# Patient Record
Sex: FEMALE | Race: WHITE | Employment: FULL TIME | URBAN - NONMETROPOLITAN AREA
[De-identification: names, ages, dates, MRNs, and addresses within clinical notes are randomized per-mention and may not be internally consistent; named-entity substitution may affect disease eponyms.]

---

## 2017-04-17 ENCOUNTER — TELEPHONE (OUTPATIENT)
Dept: UROLOGY | Age: 62
End: 2017-04-17

## 2017-04-19 ENCOUNTER — OFFICE VISIT (OUTPATIENT)
Dept: UROLOGY | Age: 62
End: 2017-04-19

## 2017-04-19 VITALS
HEIGHT: 69 IN | SYSTOLIC BLOOD PRESSURE: 112 MMHG | WEIGHT: 250 LBS | DIASTOLIC BLOOD PRESSURE: 70 MMHG | BODY MASS INDEX: 37.03 KG/M2

## 2017-04-19 DIAGNOSIS — N39.0 URINARY TRACT INFECTION, SITE UNSPECIFIED: Primary | ICD-10-CM

## 2017-04-19 LAB
BILIRUBIN URINE: NEGATIVE
BLOOD URINE, POC: NEGATIVE
CHARACTER, URINE: CLEAR
COLOR, URINE: YELLOW
GLUCOSE URINE: NEGATIVE MG/DL
KETONES, URINE: NEGATIVE
LEUKOCYTE CLUMPS, URINE: NORMAL
NITRITE, URINE: NEGATIVE
PH, URINE: 7
POST VOID RESIDUAL (PVR): 131 ML
PROTEIN, URINE: NEGATIVE MG/DL
SPECIFIC GRAVITY, URINE: 1.01 (ref 1–1.03)
UROBILINOGEN, URINE: 0.2 EU/DL

## 2017-04-19 PROCEDURE — 51798 US URINE CAPACITY MEASURE: CPT | Performed by: NURSE PRACTITIONER

## 2017-04-19 PROCEDURE — 81003 URINALYSIS AUTO W/O SCOPE: CPT | Performed by: NURSE PRACTITIONER

## 2017-04-19 PROCEDURE — 99214 OFFICE O/P EST MOD 30 MIN: CPT | Performed by: NURSE PRACTITIONER

## 2017-04-28 ENCOUNTER — TELEPHONE (OUTPATIENT)
Dept: UROLOGY | Age: 62
End: 2017-04-28

## 2017-04-28 DIAGNOSIS — R33.9 INCOMPLETE EMPTYING OF BLADDER: ICD-10-CM

## 2017-04-28 DIAGNOSIS — R35.0 URINARY FREQUENCY: Primary | ICD-10-CM

## 2017-04-29 ENCOUNTER — TELEPHONE (OUTPATIENT)
Dept: UROLOGY | Age: 62
End: 2017-04-29

## 2017-04-29 RX ORDER — DOXYCYCLINE HYCLATE 100 MG
100 TABLET ORAL 2 TIMES DAILY
Qty: 10 TABLET | Refills: 0 | Status: SHIPPED | OUTPATIENT
Start: 2017-04-29 | End: 2017-05-04

## 2017-05-08 ENCOUNTER — OFFICE VISIT (OUTPATIENT)
Dept: UROLOGY | Age: 62
End: 2017-05-08

## 2017-05-08 VITALS
SYSTOLIC BLOOD PRESSURE: 120 MMHG | WEIGHT: 249 LBS | DIASTOLIC BLOOD PRESSURE: 82 MMHG | BODY MASS INDEX: 36.88 KG/M2 | HEIGHT: 69 IN

## 2017-05-08 DIAGNOSIS — R35.0 URINARY FREQUENCY: Primary | ICD-10-CM

## 2017-05-08 DIAGNOSIS — Z87.440 HISTORY OF RECURRENT UTIS: ICD-10-CM

## 2017-05-08 LAB
BILIRUBIN URINE: NEGATIVE
BLOOD URINE, POC: NORMAL
CHARACTER, URINE: CLEAR
COLOR, URINE: YELLOW
GLUCOSE URINE: NEGATIVE MG/DL
KETONES, URINE: NEGATIVE
LEUKOCYTE CLUMPS, URINE: NORMAL
NITRITE, URINE: NEGATIVE
PH, URINE: 7
POST VOID RESIDUAL (PVR): 0 ML
POST VOID RESIDUAL (PVR): 91 ML
PROTEIN, URINE: NEGATIVE MG/DL
SPECIFIC GRAVITY, URINE: 1.01 (ref 1–1.03)
UROBILINOGEN, URINE: 0.2 EU/DL

## 2017-05-08 PROCEDURE — 51798 US URINE CAPACITY MEASURE: CPT | Performed by: UROLOGY

## 2017-05-08 PROCEDURE — 99999 PR OFFICE/OUTPT VISIT,PROCEDURE ONLY: CPT | Performed by: UROLOGY

## 2017-05-08 PROCEDURE — 52000 CYSTOURETHROSCOPY: CPT | Performed by: UROLOGY

## 2017-05-08 PROCEDURE — 81003 URINALYSIS AUTO W/O SCOPE: CPT | Performed by: UROLOGY

## 2017-05-08 RX ORDER — DOXYCYCLINE HYCLATE 100 MG/1
100 CAPSULE ORAL 2 TIMES DAILY
COMMUNITY
End: 2017-06-30 | Stop reason: ALTCHOICE

## 2017-05-08 ASSESSMENT — ENCOUNTER SYMPTOMS
VOMITING: 0
CHEST TIGHTNESS: 0
FACIAL SWELLING: 0
EYE REDNESS: 0
SHORTNESS OF BREATH: 0
EYE PAIN: 1
ABDOMINAL PAIN: 0
COLOR CHANGE: 0

## 2017-06-30 ENCOUNTER — OFFICE VISIT (OUTPATIENT)
Dept: UROLOGY | Age: 62
End: 2017-06-30

## 2017-06-30 VITALS
WEIGHT: 251 LBS | BODY MASS INDEX: 37.18 KG/M2 | HEIGHT: 69 IN | SYSTOLIC BLOOD PRESSURE: 118 MMHG | DIASTOLIC BLOOD PRESSURE: 88 MMHG

## 2017-06-30 DIAGNOSIS — N39.0 RECURRENT UTI: Primary | ICD-10-CM

## 2017-06-30 LAB
BILIRUBIN URINE: NEGATIVE
BLOOD URINE, POC: NORMAL
CHARACTER, URINE: CLEAR
COLOR, URINE: YELLOW
GLUCOSE URINE: NEGATIVE MG/DL
KETONES, URINE: NEGATIVE
LEUKOCYTE CLUMPS, URINE: NORMAL
NITRITE, URINE: NEGATIVE
PH, URINE: 7
POST VOID RESIDUAL (PVR): 156 ML
PROTEIN, URINE: NEGATIVE MG/DL
SPECIFIC GRAVITY, URINE: 1.01 (ref 1–1.03)
UROBILINOGEN, URINE: 0.2 EU/DL

## 2017-06-30 PROCEDURE — 81003 URINALYSIS AUTO W/O SCOPE: CPT | Performed by: NURSE PRACTITIONER

## 2017-06-30 PROCEDURE — 99213 OFFICE O/P EST LOW 20 MIN: CPT | Performed by: NURSE PRACTITIONER

## 2017-06-30 PROCEDURE — 51798 US URINE CAPACITY MEASURE: CPT | Performed by: NURSE PRACTITIONER

## 2017-06-30 ASSESSMENT — ENCOUNTER SYMPTOMS
VOMITING: 0
ABDOMINAL PAIN: 0
NAUSEA: 0

## 2017-07-31 ENCOUNTER — HOSPITAL ENCOUNTER (EMERGENCY)
Age: 62
Discharge: HOME | End: 2017-07-31
Attending: FAMILY MEDICINE
Payer: COMMERCIAL

## 2017-07-31 ENCOUNTER — APPOINTMENT (OUTPATIENT)
Dept: CT IMAGING | Age: 62
End: 2017-07-31
Payer: COMMERCIAL

## 2017-07-31 VITALS
OXYGEN SATURATION: 100 % | SYSTOLIC BLOOD PRESSURE: 128 MMHG | WEIGHT: 250 LBS | DIASTOLIC BLOOD PRESSURE: 73 MMHG | RESPIRATION RATE: 18 BRPM | TEMPERATURE: 98.2 F | HEIGHT: 69 IN | HEART RATE: 66 BPM | BODY MASS INDEX: 37.03 KG/M2

## 2017-07-31 DIAGNOSIS — H81.10 BENIGN PAROXYSMAL POSITIONAL VERTIGO, UNSPECIFIED LATERALITY: Primary | ICD-10-CM

## 2017-07-31 LAB
ANION GAP SERPL CALCULATED.3IONS-SCNC: 12 MEQ/L (ref 8–16)
ANISOCYTOSIS: ABNORMAL
BACTERIA: ABNORMAL
BASOPHILS # BLD: 0.4 %
BASOPHILS ABSOLUTE: 0 THOU/MM3 (ref 0–0.1)
BILIRUBIN URINE: NEGATIVE
BLOOD, URINE: NEGATIVE
BUN BLDV-MCNC: 12 MG/DL (ref 7–22)
CALCIUM SERPL-MCNC: 9.4 MG/DL (ref 8.5–10.5)
CASTS: ABNORMAL /LPF
CASTS: ABNORMAL /LPF
CHARACTER, URINE: CLEAR
CHLORIDE BLD-SCNC: 102 MEQ/L (ref 98–111)
CO2: 28 MEQ/L (ref 23–33)
COLOR: YELLOW
CREAT SERPL-MCNC: 0.7 MG/DL (ref 0.4–1.2)
CRYSTALS: ABNORMAL
EKG ATRIAL RATE: 68 BPM
EKG P AXIS: 68 DEGREES
EKG P-R INTERVAL: 186 MS
EKG Q-T INTERVAL: 386 MS
EKG QRS DURATION: 94 MS
EKG QTC CALCULATION (BAZETT): 410 MS
EKG R AXIS: -14 DEGREES
EKG T AXIS: 12 DEGREES
EKG VENTRICULAR RATE: 68 BPM
EOSINOPHIL # BLD: 2.9 %
EOSINOPHILS ABSOLUTE: 0.2 THOU/MM3 (ref 0–0.4)
EPITHELIAL CELLS, UA: ABNORMAL /HPF
GFR SERPL CREATININE-BSD FRML MDRD: 85 ML/MIN/1.73M2
GLUCOSE BLD-MCNC: 108 MG/DL (ref 70–108)
GLUCOSE, URINE: NEGATIVE MG/DL
HCT VFR BLD CALC: 42.3 % (ref 37–47)
HEMOGLOBIN: 14 GM/DL (ref 12–16)
KETONES, URINE: NEGATIVE
LEUKOCYTE ESTERASE, URINE: ABNORMAL
LYMPHOCYTES # BLD: 31.6 %
LYMPHOCYTES ABSOLUTE: 2.5 THOU/MM3 (ref 1–4.8)
MCH RBC QN AUTO: 29.5 PG (ref 27–31)
MCHC RBC AUTO-ENTMCNC: 33 GM/DL (ref 33–37)
MCV RBC AUTO: 89.4 FL (ref 81–99)
MISCELLANEOUS LAB TEST RESULT: ABNORMAL
MONOCYTES # BLD: 7.2 %
MONOCYTES ABSOLUTE: 0.6 THOU/MM3 (ref 0.4–1.3)
NITRITE, URINE: NEGATIVE
NUCLEATED RED BLOOD CELLS: 0 /100 WBC
OSMOLALITY CALCULATION: 283.4 MOSMOL/KG (ref 275–300)
PDW BLD-RTO: 14.7 % (ref 11.5–14.5)
PH UA: 6.5
PLATELET # BLD: 263 THOU/MM3 (ref 130–400)
PMV BLD AUTO: 8.3 MCM (ref 7.4–10.4)
POTASSIUM SERPL-SCNC: 4.2 MEQ/L (ref 3.5–5.2)
PROTEIN UA: NEGATIVE MG/DL
RBC # BLD: 4.73 MILL/MM3 (ref 4.2–5.4)
RBC # BLD: NORMAL 10*6/UL
RBC URINE: ABNORMAL /HPF
RENAL EPITHELIAL, UA: ABNORMAL
SEG NEUTROPHILS: 57.9 %
SEGMENTED NEUTROPHILS ABSOLUTE COUNT: 4.5 THOU/MM3 (ref 1.8–7.7)
SODIUM BLD-SCNC: 142 MEQ/L (ref 135–145)
SPECIFIC GRAVITY UA: 1.01 (ref 1–1.03)
UROBILINOGEN, URINE: 0.2 EU/DL
WBC # BLD: 7.8 THOU/MM3 (ref 4.8–10.8)
WBC UA: ABNORMAL /HPF
YEAST: ABNORMAL

## 2017-07-31 PROCEDURE — 85025 COMPLETE CBC W/AUTO DIFF WBC: CPT

## 2017-07-31 PROCEDURE — 36415 COLL VENOUS BLD VENIPUNCTURE: CPT

## 2017-07-31 PROCEDURE — 99284 EMERGENCY DEPT VISIT MOD MDM: CPT

## 2017-07-31 PROCEDURE — 80048 BASIC METABOLIC PNL TOTAL CA: CPT

## 2017-07-31 PROCEDURE — 81001 URINALYSIS AUTO W/SCOPE: CPT

## 2017-07-31 PROCEDURE — 87086 URINE CULTURE/COLONY COUNT: CPT

## 2017-07-31 PROCEDURE — 93005 ELECTROCARDIOGRAM TRACING: CPT

## 2017-07-31 PROCEDURE — 70450 CT HEAD/BRAIN W/O DYE: CPT

## 2017-07-31 RX ORDER — MECLIZINE HYDROCHLORIDE 25 MG/1
12.5-25 TABLET ORAL 3 TIMES DAILY PRN
Qty: 30 TABLET | Refills: 0 | Status: SHIPPED | OUTPATIENT
Start: 2017-07-31 | End: 2017-07-31

## 2017-07-31 RX ORDER — MECLIZINE HYDROCHLORIDE 25 MG/1
12.5-25 TABLET ORAL 3 TIMES DAILY PRN
Qty: 30 TABLET | Refills: 0 | Status: SHIPPED | OUTPATIENT
Start: 2017-07-31 | End: 2017-12-28 | Stop reason: ALTCHOICE

## 2017-07-31 ASSESSMENT — PAIN DESCRIPTION - LOCATION: LOCATION: HEAD;NECK

## 2017-07-31 ASSESSMENT — ENCOUNTER SYMPTOMS
NAUSEA: 0
VOMITING: 0
SHORTNESS OF BREATH: 0
CHEST TIGHTNESS: 0

## 2017-07-31 ASSESSMENT — PAIN DESCRIPTION - DESCRIPTORS: DESCRIPTORS: ACHING

## 2017-07-31 ASSESSMENT — PAIN SCALES - GENERAL: PAINLEVEL_OUTOF10: 2

## 2017-07-31 ASSESSMENT — PAIN DESCRIPTION - ORIENTATION: ORIENTATION: POSTERIOR

## 2017-07-31 ASSESSMENT — PAIN DESCRIPTION - PAIN TYPE: TYPE: ACUTE PAIN

## 2017-08-01 LAB
ORGANISM: ABNORMAL
URINE CULTURE, ROUTINE: ABNORMAL

## 2017-10-09 ENCOUNTER — HOSPITAL ENCOUNTER (OUTPATIENT)
Dept: GENERAL RADIOLOGY | Age: 62
Discharge: HOME | End: 2017-10-09
Payer: COMMERCIAL

## 2017-10-09 ENCOUNTER — HOSPITAL ENCOUNTER (OUTPATIENT)
Age: 62
Discharge: HOME | End: 2017-10-09
Payer: COMMERCIAL

## 2017-10-09 DIAGNOSIS — M54.2 NECK PAIN: ICD-10-CM

## 2017-10-09 PROCEDURE — 72050 X-RAY EXAM NECK SPINE 4/5VWS: CPT

## 2017-10-09 PROCEDURE — 72072 X-RAY EXAM THORAC SPINE 3VWS: CPT

## 2017-12-25 ENCOUNTER — APPOINTMENT (OUTPATIENT)
Dept: GENERAL RADIOLOGY | Age: 62
End: 2017-12-25
Payer: COMMERCIAL

## 2017-12-25 ENCOUNTER — HOSPITAL ENCOUNTER (EMERGENCY)
Age: 62
Discharge: HOME | End: 2017-12-25
Payer: COMMERCIAL

## 2017-12-25 ENCOUNTER — NURSE TRIAGE (OUTPATIENT)
Dept: ADMINISTRATIVE | Age: 62
End: 2017-12-25

## 2017-12-25 VITALS
HEART RATE: 78 BPM | SYSTOLIC BLOOD PRESSURE: 132 MMHG | OXYGEN SATURATION: 97 % | RESPIRATION RATE: 16 BRPM | DIASTOLIC BLOOD PRESSURE: 65 MMHG | TEMPERATURE: 98.6 F

## 2017-12-25 DIAGNOSIS — N30.01 ACUTE CYSTITIS WITH HEMATURIA: ICD-10-CM

## 2017-12-25 DIAGNOSIS — K59.00 CONSTIPATION, UNSPECIFIED CONSTIPATION TYPE: Primary | ICD-10-CM

## 2017-12-25 LAB
ALBUMIN SERPL-MCNC: 4 G/DL (ref 3.5–5.1)
ALP BLD-CCNC: 101 U/L (ref 38–126)
ALT SERPL-CCNC: 21 U/L (ref 11–66)
ANION GAP SERPL CALCULATED.3IONS-SCNC: 11 MEQ/L (ref 8–16)
ANISOCYTOSIS: ABNORMAL
AST SERPL-CCNC: 26 U/L (ref 5–40)
BACTERIA: ABNORMAL /HPF
BASOPHILS # BLD: 0.6 %
BASOPHILS ABSOLUTE: 0 THOU/MM3 (ref 0–0.1)
BILIRUB SERPL-MCNC: 0.2 MG/DL (ref 0.3–1.2)
BILIRUBIN DIRECT: < 0.2 MG/DL (ref 0–0.3)
BILIRUBIN URINE: NEGATIVE
BLOOD, URINE: ABNORMAL
BUN BLDV-MCNC: 9 MG/DL (ref 7–22)
CALCIUM SERPL-MCNC: 9.2 MG/DL (ref 8.5–10.5)
CASTS 2: ABNORMAL /LPF
CASTS UA: ABNORMAL /LPF
CHARACTER, URINE: ABNORMAL
CHLORIDE BLD-SCNC: 104 MEQ/L (ref 98–111)
CO2: 27 MEQ/L (ref 23–33)
COLOR: YELLOW
CREAT SERPL-MCNC: 0.7 MG/DL (ref 0.4–1.2)
CRYSTALS, UA: ABNORMAL
EKG ATRIAL RATE: 89 BPM
EKG P AXIS: 72 DEGREES
EKG P-R INTERVAL: 176 MS
EKG Q-T INTERVAL: 354 MS
EKG QRS DURATION: 94 MS
EKG QTC CALCULATION (BAZETT): 430 MS
EKG R AXIS: -26 DEGREES
EKG T AXIS: 33 DEGREES
EKG VENTRICULAR RATE: 89 BPM
EOSINOPHIL # BLD: 3.6 %
EOSINOPHILS ABSOLUTE: 0.3 THOU/MM3 (ref 0–0.4)
EPITHELIAL CELLS, UA: ABNORMAL /HPF
ETHYL ALCOHOL, SERUM: < 0.01 %
GFR SERPL CREATININE-BSD FRML MDRD: 85 ML/MIN/1.73M2
GLUCOSE BLD-MCNC: 90 MG/DL (ref 70–108)
GLUCOSE URINE: NEGATIVE MG/DL
HCT VFR BLD CALC: 42.8 % (ref 37–47)
HEMOGLOBIN: 14 GM/DL (ref 12–16)
KETONES, URINE: NEGATIVE
LACTIC ACID: 1.5 MMOL/L (ref 0.5–2.2)
LEUKOCYTE ESTERASE, URINE: ABNORMAL
LIPASE: 44.6 U/L (ref 5.6–51.3)
LYMPHOCYTES # BLD: 27.8 %
LYMPHOCYTES ABSOLUTE: 2.1 THOU/MM3 (ref 1–4.8)
MCH RBC QN AUTO: 29.3 PG (ref 27–31)
MCHC RBC AUTO-ENTMCNC: 32.8 GM/DL (ref 33–37)
MCV RBC AUTO: 89.4 FL (ref 81–99)
MISCELLANEOUS 2: ABNORMAL
MONOCYTES # BLD: 7.1 %
MONOCYTES ABSOLUTE: 0.5 THOU/MM3 (ref 0.4–1.3)
NITRITE, URINE: NEGATIVE
NUCLEATED RED BLOOD CELLS: 0 /100 WBC
OSMOLALITY CALCULATION: 281.3 MOSMOL/KG (ref 275–300)
PDW BLD-RTO: 15 % (ref 11.5–14.5)
PH UA: 6.5
PLATELET # BLD: 290 THOU/MM3 (ref 130–400)
PMV BLD AUTO: 8.3 MCM (ref 7.4–10.4)
POTASSIUM SERPL-SCNC: 4.4 MEQ/L (ref 3.5–5.2)
PROTEIN UA: NEGATIVE
RBC # BLD: 4.78 MILL/MM3 (ref 4.2–5.4)
RBC URINE: ABNORMAL /HPF
RENAL EPITHELIAL, UA: ABNORMAL
SEG NEUTROPHILS: 60.9 %
SEGMENTED NEUTROPHILS ABSOLUTE COUNT: 4.6 THOU/MM3 (ref 1.8–7.7)
SODIUM BLD-SCNC: 142 MEQ/L (ref 135–145)
SPECIFIC GRAVITY, URINE: 1 (ref 1–1.03)
TOTAL PROTEIN: 7.1 G/DL (ref 6.1–8)
TROPONIN T: < 0.01 NG/ML
UROBILINOGEN, URINE: 0.2 EU/DL
WBC # BLD: 7.6 THOU/MM3 (ref 4.8–10.8)
WBC UA: ABNORMAL /HPF
YEAST: ABNORMAL

## 2017-12-25 PROCEDURE — G0480 DRUG TEST DEF 1-7 CLASSES: HCPCS

## 2017-12-25 PROCEDURE — 80053 COMPREHEN METABOLIC PANEL: CPT

## 2017-12-25 PROCEDURE — 81001 URINALYSIS AUTO W/SCOPE: CPT

## 2017-12-25 PROCEDURE — 74000 XR ABDOMEN LIMITED (KUB): CPT

## 2017-12-25 PROCEDURE — 83690 ASSAY OF LIPASE: CPT

## 2017-12-25 PROCEDURE — 36415 COLL VENOUS BLD VENIPUNCTURE: CPT

## 2017-12-25 PROCEDURE — 85025 COMPLETE CBC W/AUTO DIFF WBC: CPT

## 2017-12-25 PROCEDURE — 84484 ASSAY OF TROPONIN QUANT: CPT

## 2017-12-25 PROCEDURE — 87086 URINE CULTURE/COLONY COUNT: CPT

## 2017-12-25 PROCEDURE — 99284 EMERGENCY DEPT VISIT MOD MDM: CPT

## 2017-12-25 PROCEDURE — 93005 ELECTROCARDIOGRAM TRACING: CPT

## 2017-12-25 PROCEDURE — 82248 BILIRUBIN DIRECT: CPT

## 2017-12-25 PROCEDURE — 83605 ASSAY OF LACTIC ACID: CPT

## 2017-12-25 RX ORDER — ONDANSETRON 4 MG/1
4 TABLET, ORALLY DISINTEGRATING ORAL EVERY 8 HOURS PRN
Qty: 20 TABLET | Refills: 0 | Status: SHIPPED | OUTPATIENT
Start: 2017-12-25 | End: 2017-12-28 | Stop reason: ALTCHOICE

## 2017-12-25 RX ORDER — POLYETHYLENE GLYCOL 3350 17 G/17G
17 POWDER, FOR SOLUTION ORAL DAILY
Qty: 510 G | Refills: 0 | Status: SHIPPED | OUTPATIENT
Start: 2017-12-25 | End: 2018-01-24

## 2017-12-25 RX ORDER — CIPROFLOXACIN 500 MG/1
500 TABLET, FILM COATED ORAL 2 TIMES DAILY
Qty: 20 TABLET | Refills: 0 | Status: SHIPPED | OUTPATIENT
Start: 2017-12-25 | End: 2018-01-04

## 2017-12-25 ASSESSMENT — ENCOUNTER SYMPTOMS
DIARRHEA: 0
COUGH: 0
WHEEZING: 0
VOMITING: 0
SORE THROAT: 0
ABDOMINAL PAIN: 0
SHORTNESS OF BREATH: 0
EYE PAIN: 0
BACK PAIN: 0
RHINORRHEA: 0
NAUSEA: 1
CONSTIPATION: 1
EYE DISCHARGE: 0

## 2017-12-25 ASSESSMENT — PAIN DESCRIPTION - FREQUENCY: FREQUENCY: CONTINUOUS

## 2017-12-25 ASSESSMENT — PAIN SCALES - GENERAL: PAINLEVEL_OUTOF10: 5

## 2017-12-25 ASSESSMENT — PAIN DESCRIPTION - LOCATION: LOCATION: ABDOMEN

## 2017-12-25 ASSESSMENT — PAIN DESCRIPTION - DESCRIPTORS: DESCRIPTORS: ACHING

## 2017-12-25 ASSESSMENT — PAIN DESCRIPTION - PAIN TYPE: TYPE: ACUTE PAIN

## 2017-12-25 ASSESSMENT — PAIN DESCRIPTION - ORIENTATION: ORIENTATION: RIGHT

## 2017-12-25 NOTE — TELEPHONE ENCOUNTER
Reason for Disposition   [1] MILD-MODERATE pain AND [2] constant AND [3] present > 2 hours    Answer Assessment - Initial Assessment Questions  1. LOCATION: \"Where does it hurt? \"       Dead center and pain on the R side. 2. RADIATION: \"Does the pain shoot anywhere else? \" (e.g., chest, back)     Is in the back  3. ONSET: \"When did the pain begin? \" (e.g., minutes, hours or days ago)      Days ago- progressively getting worse -  4. SUDDEN: \"Gradual or sudden onset? \"     gradual  5. PATTERN \"Does the pain come and go, or is it constant? \"     - If constant: \"Is it getting better, staying the same, or worsening? \"       (Note: Constant means the pain never goes away completely; most serious pain is constant and it progresses)      - If intermittent: \"How long does it last?\" \"Do you have pain now? \"      (Note: Intermittent means the pain goes away completely between bouts)      conmtinous  6. SEVERITY: \"How bad is the pain? \"  (e.g., Scale 1-10; mild, moderate, or severe)    - MILD (1-3): doesn't interfere with normal activities, abdomen soft and not tender to touch     - MODERATE (4-7): interferes with normal activities or awakens from sleep, tender to touch     - SEVERE (8-10): excruciating pain, doubled over, unable to do any normal activities 5-6       7. RECURRENT SYMPTOM: \"Have you ever had this type of abdominal pain before? \" If so, ask: \"When was the last time? \" and \"What happened that time? \"      yes8. Cpancreatiatis*No Answer*  9. RELIEVING/AGGRAVATING FACTORS: \"What makes it better or worse? \" (e.g., movement, antacids, bowel movement)      None  10. OTHER SYMPTOMS: \"Has there been any vomiting, diarrhea, constipation, or urine problems? \"      Nausea, blah feeling, no fever   11. PREGNANCY: \"Is there any chance you are pregnant? \" \"When was your last menstrual period? \"      none    Protocols used: ABDOMINAL PAIN - [facility]

## 2017-12-25 NOTE — ED PROVIDER NOTES
[facility]  eMERGENCY dEPARTMENT eNCOUnter          CHIEF COMPLAINT       Chief Complaint   Patient presents with    Abdominal Pain       Nurses Notes reviewed and I agree except as noted in the HPI. HISTORY OF PRESENT ILLNESS    Fernando Partida is a 58 y.o. female with a past medical history of acid reflux, OAB, Pancreatitis, and FM who presents to the ED for evaluation of abdominal pain. The patient has a several week history of the abdominal pain which has been persistent but unchanged since onset. Her abdominal pain is present in the RUQ which wraps around to her mid back, she denies any other areas of radiation of the pain. The abdominal pain is described as a nagging, tight sensation, which is mild in severity at this time. It is exacerbated with PO intake intermittently, denies any alleviating modifying factors. The patient reports having associated decreased appetite and mild nausea. No emesis, diarrhea, urinary symptoms, fevers, chills, chest pain, worsening shortness of breath from her baseline. The patient has chronic constipation which is managed with linzess which she only takes sporadically as it causes loose stools. The patient has not taken any medications for management of her pain. She states it is similar to her previous episode of pancreatitis. Pertinent surgical history includes a cholecystectomy and hysterectomy. No additional complaints or concerns at the time of initial evaluation. REVIEW OF SYSTEMS     Review of Systems   Constitutional: Positive for appetite change. Negative for chills, fatigue and fever. HENT: Negative for congestion, ear pain, rhinorrhea and sore throat. Eyes: Negative for pain, discharge and visual disturbance. Respiratory: Negative for cough, shortness of breath and wheezing. Cardiovascular: Negative for chest pain, palpitations and leg swelling. Gastrointestinal: Positive for constipation and nausea.  Negative for abdominal pain, (COLACE) 100 MG capsule Take 100 mg by mouth 2 times daily as needed for ConstipationHistorical Med      Ca Carbonate-Mag Hydroxide (ROLAIDS PO) Take  by mouth as needed. Or TUMS      hyoscyamine (LEVSIN) 0.125 MG tablet Take 0.125 mg by mouth every 4 hours as needed for Cramping. ALLERGIES     has No Known Allergies. FAMILY HISTORY     indicated that the status of her mother is unknown. She indicated that the status of her father is unknown. She indicated that the status of her brother is unknown. She indicated that her maternal grandmother is . She indicated that her maternal grandfather is . She indicated that the status of her paternal grandfather is unknown. She indicated that the status of her maternal aunt is unknown.    family history includes Cancer in her father, maternal aunt, and paternal grandfather; Diabetes in her father and mother; Heart Disease in her maternal grandfather and mother; Heart Disease (age of onset: 52) in her maternal grandmother; High Blood Pressure in her brother; Stroke (age of onset: 46) in her maternal grandfather. SOCIAL HISTORY      reports that she quit smoking about 2 years ago. Her smoking use included Cigarettes. She has a 14.50 pack-year smoking history. She has never used smokeless tobacco. She reports that she drinks alcohol. She reports that she does not use drugs. PHYSICAL EXAM     INITIAL VITALS:  oral temperature is 98.6 °F (37 °C). Her blood pressure is 132/65 and her pulse is 78. Her respiration is 16 and oxygen saturation is 97%. Physical Exam   Constitutional: She is oriented to person, place, and time. She appears well-developed and well-nourished. HENT:   Head: Normocephalic and atraumatic. Eyes: Conjunctivae are normal.   Neck: Normal range of motion. Cardiovascular: Normal rate, regular rhythm, normal heart sounds and intact distal pulses. Pulmonary/Chest: Effort normal. No respiratory distress.  She has no wheezes. She exhibits no tenderness. Abdominal: Soft. There is tenderness (mild RUQ tenderness and right flank tenderness) in the right upper quadrant. There is no rebound and no guarding. Musculoskeletal: Normal range of motion. Neurological: She is alert and oriented to person, place, and time. Skin: Skin is warm and dry. No rash noted. Psychiatric: She has a normal mood and affect. Nursing note and vitals reviewed. DIFFERENTIAL DIAGNOSIS:   Differential Diagnoses as discussed     DIAGNOSTIC RESULTS     EKG: All EKG's are interpreted by the Emergency Department Physician who either signs or Co-signs this chart in the absence of a cardiologist.    Signa Albino. Rate: 89 bpm  NH interval: 176 ms  QRS duration: 94 ms  QTc: 430 ms  P-R-T axes: 72, -26, 33  NSR. No STEMI       RADIOLOGY: non-plain film images(s) such as CT, Ultrasound and MRI are read by the radiologist.    XR ABDOMEN (KUB) (SINGLE AP VIEW)   Final Result   There is mild constipation there is no acute finding            **This report has been created using voice recognition software. It may contain minor errors which are inherent in voice recognition technology. **      Final report electronically signed by Dr. Kelsey Gonzalez on 12/25/2017 2:19 PM           LABS:     Labs Reviewed   CBC WITH AUTO DIFFERENTIAL - Abnormal; Notable for the following:        Result Value    MCHC 32.8 (*)     RDW 15.0 (*)     All other components within normal limits   HEPATIC FUNCTION PANEL - Abnormal; Notable for the following:      Total Bilirubin 0.2 (*)     All other components within normal limits   URINE WITH REFLEXED MICRO - Abnormal; Notable for the following:     Blood, Urine TRACE (*)     Leukocyte Esterase, Urine MODERATE (*)     All other components within normal limits   GLOMERULAR FILTRATION RATE, ESTIMATED - Abnormal; Notable for the following:     Est, Glom Filt Rate 85 (*)     All other components within normal limits   URINE CULTURE, REFLEXED unspecified constipation type    2. Acute cystitis with hematuria          DISPOSITION/PLAN   discharge    PATIENT REFERRED TO:  Deidre Scheuermann, APRN  [address] [address]  [phone]    In 3 days  as scheduled    [facility] EMERGENCY DEPT  [address]  [address]  [phone]    If symptoms worsen      DISCHARGE MEDICATIONS:  Discharge Medication List as of 12/25/2017  4:24 PM      START taking these medications    Details   ciprofloxacin (CIPRO) 500 MG tablet Take 1 tablet by mouth 2 times daily for 10 days, Disp-20 tablet, R-0Print      ondansetron (ZOFRAN ODT) 4 MG disintegrating tablet Take 1 tablet by mouth every 8 hours as needed for Nausea or Vomiting, Disp-20 tablet, R-0Print      polyethylene glycol (MIRALAX) powder Take 17 g by mouth daily, Disp-510 g, R-0Print             (Please note that portions of this note were completed with a voice recognition program.  Efforts were made to edit the dictations but occasionally words are mis-transcribed.)    The patient was given an opportunity to see the Emergency Attending. The patient voiced understanding that I was a Mid-Level Provider and was in agreement with being seen independently by myself. This document serves as a record of the services and decisions personally performed and made by Osmar Crocker PA-C. It was created on their behalf by Raffaele Mendoza, a trained medical scribe. The creation of this document is based the provider's statements to the medical scribe. Signed by: Buck Womack, 5:22 PM 12/25/17     Provider: The information in this document, created by the medical scribe for me, accurately reflects the services I personally performed and the decisions made by me.     Osmar Crocker PA-C   5:22 PM 12/25/17          Ana Garcia PA-C  12/25/17 [id number]

## 2017-12-27 LAB
ORGANISM: ABNORMAL
URINE CULTURE REFLEX: ABNORMAL

## 2018-01-17 ENCOUNTER — HOSPITAL ENCOUNTER (OUTPATIENT)
Dept: AUDIOLOGY | Age: 63
Discharge: HOME | End: 2018-01-17
Payer: COMMERCIAL

## 2018-01-17 PROCEDURE — 92557 COMPREHENSIVE HEARING TEST: CPT | Performed by: AUDIOLOGIST

## 2018-01-17 PROCEDURE — 92567 TYMPANOMETRY: CPT | Performed by: AUDIOLOGIST

## 2018-01-17 NOTE — LETTER
are working properly today. RECOMMENDATION(S):   1.  New earmolds are recommended for both ears due to loose fit. Ear impressions were completed today and new molds will be ordered. Patient will be contacted when they come in.  2.  Tubing was replaced for both earmolds today. Hearing aids will be adjusted if needed when patient picks up new earmolds. If you have questions, please do not hesitate to call me    Brian Hernandez M.S. Jodi Tavera.   Audiologist

## 2018-02-07 ENCOUNTER — HOSPITAL ENCOUNTER (OUTPATIENT)
Dept: AUDIOLOGY | Age: 63
Discharge: HOME | End: 2018-02-07

## 2018-02-07 PROCEDURE — V5264 EAR MOLD/INSERT: HCPCS | Performed by: AUDIOLOGIST

## 2018-02-26 ENCOUNTER — HOSPITAL ENCOUNTER (OUTPATIENT)
Age: 63
Discharge: HOME | End: 2018-02-26
Payer: COMMERCIAL

## 2018-02-26 ENCOUNTER — HOSPITAL ENCOUNTER (OUTPATIENT)
Dept: GENERAL RADIOLOGY | Age: 63
Discharge: HOME | End: 2018-02-26
Payer: COMMERCIAL

## 2018-02-26 DIAGNOSIS — M25.511 RIGHT SHOULDER PAIN, UNSPECIFIED CHRONICITY: ICD-10-CM

## 2018-02-26 DIAGNOSIS — J44.9 CHRONIC OBSTRUCTIVE PULMONARY DISEASE, UNSPECIFIED COPD TYPE (HCC): ICD-10-CM

## 2018-02-26 LAB
ANION GAP SERPL CALCULATED.3IONS-SCNC: 17 MEQ/L (ref 8–16)
BUN BLDV-MCNC: 20 MG/DL (ref 7–22)
CALCIUM SERPL-MCNC: 9.9 MG/DL (ref 8.5–10.5)
CHLORIDE BLD-SCNC: 98 MEQ/L (ref 98–111)
CO2: 25 MEQ/L (ref 23–33)
CREAT SERPL-MCNC: 0.8 MG/DL (ref 0.4–1.2)
EKG ATRIAL RATE: 66 BPM
EKG P AXIS: 73 DEGREES
EKG P-R INTERVAL: 190 MS
EKG Q-T INTERVAL: 384 MS
EKG QRS DURATION: 94 MS
EKG QTC CALCULATION (BAZETT): 402 MS
EKG R AXIS: 47 DEGREES
EKG T AXIS: -10 DEGREES
EKG VENTRICULAR RATE: 66 BPM
GFR SERPL CREATININE-BSD FRML MDRD: 73 ML/MIN/1.73M2
GLUCOSE BLD-MCNC: 91 MG/DL (ref 70–108)
MAGNESIUM: 2 MG/DL (ref 1.6–2.4)
POTASSIUM SERPL-SCNC: 4.1 MEQ/L (ref 3.5–5.2)
SODIUM BLD-SCNC: 140 MEQ/L (ref 135–145)

## 2018-02-26 PROCEDURE — 93005 ELECTROCARDIOGRAM TRACING: CPT | Performed by: NURSE PRACTITIONER

## 2018-02-26 PROCEDURE — 36415 COLL VENOUS BLD VENIPUNCTURE: CPT

## 2018-02-26 PROCEDURE — 73030 X-RAY EXAM OF SHOULDER: CPT

## 2018-02-26 PROCEDURE — 83735 ASSAY OF MAGNESIUM: CPT

## 2018-02-26 PROCEDURE — 80048 BASIC METABOLIC PNL TOTAL CA: CPT

## 2018-02-26 PROCEDURE — 93010 ELECTROCARDIOGRAM REPORT: CPT | Performed by: NUCLEAR MEDICINE

## 2018-02-26 PROCEDURE — 71046 X-RAY EXAM CHEST 2 VIEWS: CPT

## 2018-03-10 ENCOUNTER — HOSPITAL ENCOUNTER (EMERGENCY)
Age: 63
Discharge: HOME | End: 2018-03-10
Attending: EMERGENCY MEDICINE
Payer: COMMERCIAL

## 2018-03-10 VITALS
DIASTOLIC BLOOD PRESSURE: 53 MMHG | OXYGEN SATURATION: 100 % | HEART RATE: 70 BPM | HEIGHT: 69 IN | RESPIRATION RATE: 24 BRPM | BODY MASS INDEX: 38.51 KG/M2 | TEMPERATURE: 98 F | WEIGHT: 260 LBS | SYSTOLIC BLOOD PRESSURE: 115 MMHG

## 2018-03-10 DIAGNOSIS — R03.0 ELEVATED BLOOD PRESSURE READING WITHOUT DIAGNOSIS OF HYPERTENSION: ICD-10-CM

## 2018-03-10 DIAGNOSIS — R07.9 CHEST PAIN, UNSPECIFIED TYPE: ICD-10-CM

## 2018-03-10 DIAGNOSIS — I45.10 RIGHT BUNDLE BRANCH BLOCK: ICD-10-CM

## 2018-03-10 DIAGNOSIS — R07.9 ACUTE CHEST PAIN: Primary | ICD-10-CM

## 2018-03-10 LAB
ALBUMIN SERPL-MCNC: 4.2 G/DL (ref 3.5–5.1)
ALP BLD-CCNC: 91 U/L (ref 38–126)
ALT SERPL-CCNC: 18 U/L (ref 11–66)
ANION GAP SERPL CALCULATED.3IONS-SCNC: 12 MEQ/L (ref 8–16)
AST SERPL-CCNC: 22 U/L (ref 5–40)
BASOPHILS # BLD: 1.4 %
BASOPHILS ABSOLUTE: 0.1 THOU/MM3 (ref 0–0.1)
BILIRUB SERPL-MCNC: 0.4 MG/DL (ref 0.3–1.2)
BILIRUBIN DIRECT: < 0.2 MG/DL (ref 0–0.3)
BUN BLDV-MCNC: 8 MG/DL (ref 7–22)
CALCIUM SERPL-MCNC: 9.8 MG/DL (ref 8.5–10.5)
CHLORIDE BLD-SCNC: 102 MEQ/L (ref 98–111)
CO2: 25 MEQ/L (ref 23–33)
CREAT SERPL-MCNC: 0.7 MG/DL (ref 0.4–1.2)
EKG ATRIAL RATE: 72 BPM
EKG P AXIS: 74 DEGREES
EKG P-R INTERVAL: 178 MS
EKG Q-T INTERVAL: 368 MS
EKG QRS DURATION: 94 MS
EKG QTC CALCULATION (BAZETT): 402 MS
EKG R AXIS: -22 DEGREES
EKG T AXIS: 22 DEGREES
EKG VENTRICULAR RATE: 72 BPM
EOSINOPHIL # BLD: 2.6 %
EOSINOPHILS ABSOLUTE: 0.1 THOU/MM3 (ref 0–0.4)
GFR SERPL CREATININE-BSD FRML MDRD: 85 ML/MIN/1.73M2
GLUCOSE BLD-MCNC: 102 MG/DL (ref 70–108)
HCT VFR BLD CALC: 40.4 % (ref 37–47)
HEMOGLOBIN: 13.3 GM/DL (ref 12–16)
LIPASE: 36.3 U/L (ref 5.6–51.3)
LYMPHOCYTES # BLD: 29.9 %
LYMPHOCYTES ABSOLUTE: 1.7 THOU/MM3 (ref 1–4.8)
MCH RBC QN AUTO: 29.4 PG (ref 27–31)
MCHC RBC AUTO-ENTMCNC: 33 GM/DL (ref 33–37)
MCV RBC AUTO: 89.1 FL (ref 81–99)
MONOCYTES # BLD: 7.5 %
MONOCYTES ABSOLUTE: 0.4 THOU/MM3 (ref 0.4–1.3)
NUCLEATED RED BLOOD CELLS: 0 /100 WBC
OSMOLALITY CALCULATION: 276.1 MOSMOL/KG (ref 275–300)
PDW BLD-RTO: 14.4 % (ref 11.5–14.5)
PLATELET # BLD: 300 THOU/MM3 (ref 130–400)
PMV BLD AUTO: 8.6 FL (ref 7.4–10.4)
POTASSIUM SERPL-SCNC: 4.1 MEQ/L (ref 3.5–5.2)
RBC # BLD: 4.54 MILL/MM3 (ref 4.2–5.4)
SEG NEUTROPHILS: 58.6 %
SEGMENTED NEUTROPHILS ABSOLUTE COUNT: 3.3 THOU/MM3 (ref 1.8–7.7)
SODIUM BLD-SCNC: 139 MEQ/L (ref 135–145)
TOTAL PROTEIN: 7.1 G/DL (ref 6.1–8)
TROPONIN T: < 0.01 NG/ML
WBC # BLD: 5.7 THOU/MM3 (ref 4.8–10.8)

## 2018-03-10 PROCEDURE — 83690 ASSAY OF LIPASE: CPT

## 2018-03-10 PROCEDURE — 99285 EMERGENCY DEPT VISIT HI MDM: CPT

## 2018-03-10 PROCEDURE — 82248 BILIRUBIN DIRECT: CPT

## 2018-03-10 PROCEDURE — 93005 ELECTROCARDIOGRAM TRACING: CPT | Performed by: EMERGENCY MEDICINE

## 2018-03-10 PROCEDURE — 36415 COLL VENOUS BLD VENIPUNCTURE: CPT

## 2018-03-10 PROCEDURE — 85025 COMPLETE CBC W/AUTO DIFF WBC: CPT

## 2018-03-10 PROCEDURE — 99215 OFFICE O/P EST HI 40 MIN: CPT

## 2018-03-10 PROCEDURE — 84484 ASSAY OF TROPONIN QUANT: CPT

## 2018-03-10 PROCEDURE — 6370000000 HC RX 637 (ALT 250 FOR IP): Performed by: EMERGENCY MEDICINE

## 2018-03-10 PROCEDURE — 80053 COMPREHEN METABOLIC PANEL: CPT

## 2018-03-10 RX ORDER — ASPIRIN 81 MG/1
TABLET, CHEWABLE ORAL
Status: DISCONTINUED
Start: 2018-03-10 | End: 2018-03-10 | Stop reason: HOSPADM

## 2018-03-10 RX ORDER — ASPIRIN 81 MG/1
324 TABLET, CHEWABLE ORAL ONCE
Status: COMPLETED | OUTPATIENT
Start: 2018-03-10 | End: 2018-03-10

## 2018-03-10 RX ADMIN — ASPIRIN 81 MG 324 MG: 81 TABLET ORAL at 11:14

## 2018-03-10 ASSESSMENT — ENCOUNTER SYMPTOMS
VOMITING: 0
STRIDOR: 0
SINUS PRESSURE: 0
WHEEZING: 0
COUGH: 1
CHOKING: 0
CONSTIPATION: 0
BACK PAIN: 0
EYE REDNESS: 0
FACIAL SWELLING: 0
SORE THROAT: 0
EYE DISCHARGE: 0
ABDOMINAL PAIN: 0
SHORTNESS OF BREATH: 0
COUGH: 0
TROUBLE SWALLOWING: 0
BLOOD IN STOOL: 0
EYE PAIN: 0
VOICE CHANGE: 0
DIARRHEA: 0
NAUSEA: 0

## 2018-03-10 ASSESSMENT — PAIN DESCRIPTION - FREQUENCY: FREQUENCY: CONTINUOUS

## 2018-03-10 ASSESSMENT — PAIN DESCRIPTION - LOCATION: LOCATION: CHEST

## 2018-03-10 ASSESSMENT — PAIN DESCRIPTION - DESCRIPTORS: DESCRIPTORS: TIGHTNESS

## 2018-03-10 ASSESSMENT — PAIN DESCRIPTION - ONSET: ONSET: ON-GOING

## 2018-03-10 ASSESSMENT — PAIN SCALES - GENERAL: PAINLEVEL_OUTOF10: 2

## 2018-03-10 ASSESSMENT — PAIN DESCRIPTION - PAIN TYPE: TYPE: ACUTE PAIN

## 2018-03-10 NOTE — PROGRESS NOTES
Patient released in stable condition. Instructed to go directly to [facility] emergency department for further evaluation and treatment. Instructed to remain NPO until seen by emergency room provider. Patient verbalized understanding. Ambulated, per self, to private car.

## 2018-03-10 NOTE — ED PROVIDER NOTES
[facility]  Urgent Care Encounter      CHIEF COMPLAINT       Chief Complaint   Patient presents with    Chest Pain       Nurses Notes reviewed and I agree except as noted in the HPI. HISTORY OF PRESENT ILLNESS   Lexii Montes is a 58 y.o. female who presents With three-day history of intermittent substernal pressure which she rates as severe as 3 out of 10 severity. No diaphoresis, shortness of breath, palpitations, dizziness, syncope, hemoptysis, leg pain or leg swelling. No history of DM, PE, heart disease or hypertension. Quit smoking 2015    REVIEW OF SYSTEMS     Review of Systems   Constitutional: Negative for appetite change, chills, fatigue, fever and unexpected weight change. HENT: Negative for congestion, ear discharge, ear pain, facial swelling, hearing loss, nosebleeds, postnasal drip, sinus pressure, sore throat, trouble swallowing and voice change. Eyes: Negative for pain, discharge, redness and visual disturbance. Respiratory: Positive for cough. Negative for choking, shortness of breath, wheezing and stridor. Cardiovascular: Positive for chest pain. Negative for leg swelling. Gastrointestinal: Negative for abdominal pain, blood in stool, constipation, diarrhea, nausea and vomiting. Genitourinary: Negative for dysuria, flank pain, frequency, hematuria, urgency, vaginal bleeding and vaginal discharge. Musculoskeletal: Negative for arthralgias, back pain, neck pain and neck stiffness. Skin: Negative for rash. Neurological: Negative for dizziness, seizures, syncope, weakness, light-headedness and headaches. Hematological: Negative for adenopathy. Does not bruise/bleed easily. Psychiatric/Behavioral: Negative for confusion, sleep disturbance and suicidal ideas. The patient is not nervous/anxious. All other systems reviewed and are negative.       PAST MEDICAL HISTORY         Diagnosis Date    Acid reflux     Arthritis     osteo -   knees    Cancer ([facility] [address] of onset: 52) in her maternal grandmother; High Blood Pressure in her brother; Other in her mother; Stroke (age of onset: 46) in her maternal grandfather. SOCIAL HISTORY     Patient  reports that she quit smoking about 2 years ago. Her smoking use included Cigarettes. She has a 14.50 pack-year smoking history. She has never used smokeless tobacco. She reports that she drinks alcohol. She reports that she does not use drugs. PHYSICAL EXAM     ED TRIAGE VITALS  BP: (!) 173/81, Temp: 98 °F (36.7 °C), Pulse: 84, Resp: 18, SpO2: 100 %  Physical Exam   Constitutional: She is oriented to person, place, and time. She appears well-developed and well-nourished. No distress. Moist membranes, normal airway   HENT:   Head: Normocephalic and atraumatic. Right Ear: Tympanic membrane and external ear normal.   Left Ear: Tympanic membrane and external ear normal.   Nose: Nose normal.   Mouth/Throat: Oropharynx is clear and moist. No trismus in the jaw. No uvula swelling. No oropharyngeal exudate, posterior oropharyngeal edema, posterior oropharyngeal erythema or tonsillar abscesses. Eyes: Conjunctivae and EOM are normal. Pupils are equal, round, and reactive to light. Right eye exhibits no discharge. Left eye exhibits no discharge. No scleral icterus. Neck: Normal range of motion. No JVD present. No thyromegaly present. No meningismus   Cardiovascular: Normal rate, regular rhythm, S1 normal, S2 normal, normal heart sounds, intact distal pulses and normal pulses. Exam reveals no gallop and no friction rub. No murmur heard. Pulmonary/Chest: Effort normal and breath sounds normal. No stridor. No tachypnea. No respiratory distress. She has no decreased breath sounds. She has no wheezes. She has no rhonchi. She has no rales. She exhibits no tenderness. No cough, lungs clear   Abdominal: Soft. Bowel sounds are normal. She exhibits no distension and no mass. There is no tenderness.  There is no rebound and no

## 2018-03-10 NOTE — ED PROVIDER NOTES
swelling. Skin: Negative for rash. Neurological: Negative for weakness and numbness. Hematological: Negative for adenopathy. Psychiatric/Behavioral: Negative for confusion. PAST MEDICAL HISTORY    has a past medical history of Acid reflux; Arthritis; Cancer ([facility] [address] Cancer ([facility] [address] Cataracts, bilateral; Depression; Epilepsy ([facility] [address] Fibromyalgia; Hearing loss; Hoarseness; Hypothyroidism; Mono exposure; Night sweats; Osteoarthritis; Sinus infection; and SOB (shortness of breath). SURGICAL HISTORY      has a past surgical history that includes cardiovascular stress test (11); doppler echocardiography (11); Cholecystectomy (); Tonsillectomy; Colonoscopy; fracture surgery (?? ); Hysterectomy; shoulder surgery; other surgical history (29 OCT 2014); sinus surgery (2015); Colonoscopy (2016); and ekg 12 lead (historical) (2017). CURRENT MEDICATIONS       Previous Medications    ARMOUR THYROID PO    Take 45 mg by mouth daily. ASPIRIN 81 MG CHEWABLE TABLET    Take 81 mg by mouth daily    CA CARBONATE-MAG HYDROXIDE (ROLAIDS PO)    Take  by mouth as needed. Or TUMS    CHOLECALCIFEROL (VITAMIN D3) 5000 UNITS TABS    Take by mouth Indications: every other day     DULOXETINE (CYMBALTA) 20 MG EXTENDED RELEASE CAPSULE    Take 20 mg by mouth 2 times daily    ESOMEPRAZOLE MAGNESIUM (NEXIUM) 20 MG PACK    Take 20 mg by mouth as needed. GARLIC PO    Take by mouth daily    HYOSCYAMINE (LEVSIN) 0.125 MG TABLET    Take 0.125 mg by mouth every 4 hours as needed for Cramping. LINACLOTIDE (LINZESS PO)    Take by mouth as needed    LORATADINE (CLARITIN) 10 MG TABLET    Take 1 tablet by mouth daily    MAGNESIUM GLUCONATE (MAGONATE) 500 MG TABLET    Take 500 mg by mouth 2 times daily    MILK THISTLE 1000 MG CAPS    Take by mouth daily    POTASSIUM 99 MG TABS    Take by mouth daily       ALLERGIES     has No Known Allergies. FAMILY HISTORY     indicated that her mother is .  She indicated that her father is . She indicated that the status of her brother is unknown. She indicated that her maternal grandmother is . She indicated that her maternal grandfather is . She indicated that the status of her paternal grandfather is unknown. She indicated that the status of her maternal aunt is unknown. She indicated that the status of her neg hx is unknown.    family history includes Cancer in her father, maternal aunt, and paternal grandfather; Diabetes in her father and mother; Heart Disease in her maternal grandfather and mother; Heart Disease (age of onset: 52) in her maternal grandmother; High Blood Pressure in her brother; Other in her mother; Stroke (age of onset: 46) in her maternal grandfather. SOCIAL HISTORY      reports that she quit smoking about 2 years ago. Her smoking use included Cigarettes. She has a 14.50 pack-year smoking history. She has never used smokeless tobacco. She reports that she drinks alcohol. She reports that she does not use drugs. PHYSICAL EXAM     INITIAL VITALS:  height is 5' 9\" (1.753 m) and weight is 260 lb (117.9 kg). Her temporal temperature is 98 °F (36.7 °C). Her blood pressure is 173/81 (abnormal) and her pulse is 84. Her respiration is 18 and oxygen saturation is 100%. Physical Exam   Constitutional: She is oriented to person, place, and time. She appears well-developed and well-nourished. GCS 15   HENT:   Head: Normocephalic and atraumatic. Bilateral hearing aids    Nose is clear    She has a dressing on the left upper lip from recent dermatologic surgery       Eyes: Conjunctivae are normal. Pupils are equal, round, and reactive to light. No scleral icterus. Neck: Normal range of motion. Neck supple. No JVD present. Cardiovascular: Normal rate, regular rhythm and intact distal pulses. No murmur heard. Pulmonary/Chest: Effort normal and breath sounds normal. She has no wheezes. Abdominal: Soft.  Bowel sounds are normal. There is no tenderness. There is no rebound and no guarding. Musculoskeletal: Normal range of motion. She exhibits no edema. Neurological: She is alert and oriented to person, place, and time. She exhibits normal muscle tone. Skin: Skin is warm and dry. Psychiatric: She has a normal mood and affect. Her behavior is normal.   Nursing note and vitals reviewed. DIFFERENTIAL DIAGNOSIS:     Chest pain characterized as tightness    Evaluate for cardiac ischemia    O2 sat is 100% on room air, lungs are clear, no history of DVT, possibility of PE is extremely low        DIAGNOSTIC RESULTS     EKG: All EKG's are interpreted by the Emergency Department Physician who either signs or Co-signs this chart in the absence of a cardiologist.    EKG from urgent care was reviewed and showed sinus rhythm with rate 72. QRS complexes show normal axis, normal conduction. Incomplete right bundle branch. ST-T waves show no acute change                LABS:   Labs Reviewed   GLOMERULAR FILTRATION RATE, ESTIMATED - Abnormal; Notable for the following:        Result Value    Est, Glom Filt Rate 85 (*)     All other components within normal limits   HEPATIC FUNCTION PANEL   BASIC METABOLIC PANEL   LIPASE   CBC WITH AUTO DIFFERENTIAL   TROPONIN   ANION GAP   OSMOLALITY       EMERGENCY DEPARTMENT COURSE:   Vitals:    Vitals:    03/10/18 1109   BP: (!) 173/81   Pulse: 84   Resp: 18   Temp: 98 °F (36.7 °C)   TempSrc: Temporal   SpO2: 100%   Weight: 260 lb (117.9 kg)   Height: 5' 9\" (1.753 m)     11:56 AM: The patient was seen and evaluated.     Nursing notes reviewed    She has a chest tightness with normal EKG normal troponin    O2 sats are 100%    She had a chest x-ray 2 weeks ago which was normal and not repeated today    I suspect the chest tightness is more musculoskeletal    Discharge home    Follow with her primary care           CRITICAL CARE:   none    CONSULTS:  none    PROCEDURES:  None    FINAL IMPRESSION

## 2018-03-11 PROCEDURE — 93010 ELECTROCARDIOGRAM REPORT: CPT | Performed by: INTERNAL MEDICINE

## 2018-03-16 ENCOUNTER — HOSPITAL ENCOUNTER (OUTPATIENT)
Dept: CT IMAGING | Age: 63
Discharge: HOME | End: 2018-03-16
Payer: COMMERCIAL

## 2018-03-16 DIAGNOSIS — J44.9 CHRONIC OBSTRUCTIVE PULMONARY DISEASE, UNSPECIFIED COPD TYPE (HCC): ICD-10-CM

## 2018-03-16 DIAGNOSIS — R59.0 HILAR ADENOPATHY: ICD-10-CM

## 2018-03-16 DIAGNOSIS — D17.9 LIPOMA, UNSPECIFIED SITE: ICD-10-CM

## 2018-03-16 PROCEDURE — 71250 CT THORAX DX C-: CPT

## 2018-03-26 ENCOUNTER — OFFICE VISIT (OUTPATIENT)
Dept: INTERNAL MEDICINE CLINIC | Age: 63
End: 2018-03-26
Payer: COMMERCIAL

## 2018-03-26 VITALS
WEIGHT: 256.5 LBS | HEART RATE: 64 BPM | HEIGHT: 69 IN | SYSTOLIC BLOOD PRESSURE: 130 MMHG | DIASTOLIC BLOOD PRESSURE: 72 MMHG | BODY MASS INDEX: 37.99 KG/M2

## 2018-03-26 DIAGNOSIS — R94.31 ABNORMAL EKG: ICD-10-CM

## 2018-03-26 DIAGNOSIS — I20.8 OTHER FORMS OF ANGINA PECTORIS (HCC): Primary | ICD-10-CM

## 2018-03-26 PROCEDURE — 99202 OFFICE O/P NEW SF 15 MIN: CPT | Performed by: INTERNAL MEDICINE

## 2018-03-26 RX ORDER — ATORVASTATIN CALCIUM 40 MG/1
40 TABLET, FILM COATED ORAL DAILY
Qty: 90 TABLET | Refills: 0 | Status: SHIPPED | OUTPATIENT
Start: 2018-03-26 | End: 2018-04-05

## 2018-03-26 ASSESSMENT — PATIENT HEALTH QUESTIONNAIRE - PHQ9
SUM OF ALL RESPONSES TO PHQ9 QUESTIONS 1 & 2: 2
SUM OF ALL RESPONSES TO PHQ QUESTIONS 1-9: 2
1. LITTLE INTEREST OR PLEASURE IN DOING THINGS: 1
2. FEELING DOWN, DEPRESSED OR HOPELESS: 1

## 2018-03-26 NOTE — PROGRESS NOTES
YOB: 1955    Chief Complaint   Patient presents with    Other     abn EKG-referral from [facility] daily     NEW SXS: episode of chest tightness; seen in ER; ekg periodically shows incomplete RBBB. Also has some chest tightness with exertion. exsmoker, pos fh cad    Denies hbp. Lives alone    This patient presents for medical evaluation. .  This patient is followed regularly by Guillermina Sotelos. Patient Active Problem List   Diagnosis    Hypothyroidism    Arthritis    Cancer ([facility] [address]    Acid reflux       Current Outpatient Prescriptions   Medication Sig Dispense Refill    UNABLE TO FIND Magnesium oil applies daily to stomach      Coenzyme Q10 (CO Q 10 PO) Take by mouth      Omega-3 Fatty Acids (FISH OIL PO) Take by mouth      atorvastatin (LIPITOR) 40 MG tablet Take 1 tablet by mouth daily 90 tablet 0    Milk Thistle 1000 MG CAPS Take by mouth daily      Potassium 99 MG TABS Take by mouth daily      GARLIC PO Take by mouth daily      loratadine (CLARITIN) 10 MG tablet Take 1 tablet by mouth daily 30 tablet 0    Cholecalciferol (VITAMIN D3) 5000 UNITS TABS Take by mouth Indications: every other day       aspirin 81 MG chewable tablet Take 81 mg by mouth daily      esomeprazole Magnesium (NEXIUM) 20 MG PACK Take 20 mg by mouth daily       Ca Carbonate-Mag Hydroxide (ROLAIDS PO) Take  by mouth as needed. Or TUMS      hyoscyamine (LEVSIN) 0.125 MG tablet Take 0.125 mg by mouth every 4 hours as needed for Cramping.  ARMOUR THYROID PO Take 45 mg by mouth daily. No current facility-administered medications for this visit. No Known Allergies    I have reviewed the patient's medications, as well as, their past medical, social, and family history as appropriate. Review of Systems - General ROS: negative  Psychological ROS: negative  Hematological and Lymphatic ROS: No history of blood clots or bleeding disorder.    Respiratory ROS: positive for - shortness of breath and wheezing( occas)  Cardiovascular ROS: see above  Gastrointestinal ROS: occas diarrhea  Genito-Urinary ROS: no dysuria, trouble voiding, or hematuria  Musculoskeletal ROS:  dxd as fibromyalgia  Neurological ROS: no TIA or stroke symptoms  Dermatological ROS: skin cancer removed recently    Blood pressure 130/72, pulse 64, height 5' 9\" (1.753 m), weight 256 lb 8 oz (116.3 kg). Physical Examination: General appearance - alert, well appearing, and in no distress and overweight  Mental status - alert, oriented to person, place, and time  Neck - supple, no significant adenopathy, no JVD, or carotid bruits  Chest - clear to auscultation, no wheezes, rales or rhonchi, symmetric air entry  Heart - normal rate, regular rhythm, normal S1, S2, no murmurs, rubs, clicks or gallops  Abdomen - soft, nontender, nondistended, no masses or organomegaly  Neurological - alert, oriented, normal speech, no focal findings or movement disorder noted  Musculoskeletal - no muscular tenderness noted  Extremities - no pedal edema noted  Skin - normal coloration and turgor, no rashes, no suspicious skin lesions noted         1. Other forms of angina pectoris ([address]  Stress test, lexiscan   2. Abnormal EKG         Orders Placed This Encounter   Procedures    Stress test, lexiscan     Scheduling Instructions:      Give albuterol mdi before test     Order Specific Question:   Reason for Exam?     Answer:   Chest pain        IMP/PLAN:    1. Abnormal ekg; doubt related to vascular dz  2. Chest tightness; given risk factors and fh, plan stress        Will schedule follow up appointment in prn. Will call pt. Continue medications at current doses. Signed By:  Shantel Miller M.D.

## 2018-03-29 ENCOUNTER — TELEPHONE (OUTPATIENT)
Dept: INTERNAL MEDICINE | Age: 63
End: 2018-03-29

## 2018-04-05 ENCOUNTER — APPOINTMENT (OUTPATIENT)
Dept: CT IMAGING | Age: 63
End: 2018-04-05
Payer: COMMERCIAL

## 2018-04-05 ENCOUNTER — HOSPITAL ENCOUNTER (EMERGENCY)
Age: 63
Discharge: HOME | End: 2018-04-05
Payer: COMMERCIAL

## 2018-04-05 VITALS
BODY MASS INDEX: 37.92 KG/M2 | HEIGHT: 69 IN | RESPIRATION RATE: 18 BRPM | WEIGHT: 256 LBS | HEART RATE: 71 BPM | SYSTOLIC BLOOD PRESSURE: 148 MMHG | DIASTOLIC BLOOD PRESSURE: 83 MMHG | TEMPERATURE: 97.8 F | OXYGEN SATURATION: 97 %

## 2018-04-05 DIAGNOSIS — S16.1XXA STRAIN OF NECK MUSCLE, INITIAL ENCOUNTER: ICD-10-CM

## 2018-04-05 DIAGNOSIS — S60.229A CONTUSION OF HAND, UNSPECIFIED LATERALITY, INITIAL ENCOUNTER: ICD-10-CM
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
## 2018-04-05 DIAGNOSIS — S09.90XA CLOSED HEAD INJURY, INITIAL ENCOUNTER: Primary | ICD-10-CM

## 2018-04-05 PROCEDURE — 72125 CT NECK SPINE W/O DYE: CPT

## 2018-04-05 PROCEDURE — 99284 EMERGENCY DEPT VISIT MOD MDM: CPT

## 2018-04-05 PROCEDURE — 70450 CT HEAD/BRAIN W/O DYE: CPT

## 2018-04-05 RX ORDER — ORPHENADRINE CITRATE 100 MG/1
100 TABLET, EXTENDED RELEASE ORAL 2 TIMES DAILY
Qty: 14 TABLET | Refills: 0 | Status: SHIPPED | OUTPATIENT
Start: 2018-04-05 | End: 2018-05-01

## 2018-04-05 ASSESSMENT — PAIN DESCRIPTION - LOCATION
LOCATION: HEAD;NECK
LOCATION_2: HAND
LOCATION: HEAD

## 2018-04-05 ASSESSMENT — ENCOUNTER SYMPTOMS
COUGH: 0
RHINORRHEA: 0
SORE THROAT: 0
NAUSEA: 0
ABDOMINAL PAIN: 0
EYE PAIN: 0
VOMITING: 0
SHORTNESS OF BREATH: 0
WHEEZING: 0
DIARRHEA: 0
EYE DISCHARGE: 0
BACK PAIN: 0

## 2018-04-05 ASSESSMENT — PAIN DESCRIPTION - PAIN TYPE
TYPE: ACUTE PAIN
TYPE: ACUTE PAIN

## 2018-04-05 ASSESSMENT — PAIN DESCRIPTION - DESCRIPTORS
DESCRIPTORS_2: ACHING
DESCRIPTORS: ACHING

## 2018-04-05 ASSESSMENT — PAIN SCALES - WONG BAKER
WONGBAKER_NUMERICALRESPONSE: 4
WONGBAKER_NUMERICALRESPONSE: 2

## 2018-04-12 ENCOUNTER — HOSPITAL ENCOUNTER (OUTPATIENT)
Dept: NON INVASIVE DIAGNOSTICS | Age: 63
Discharge: HOME | End: 2018-04-12
Payer: COMMERCIAL

## 2018-04-12 VITALS — BODY MASS INDEX: 37.92 KG/M2 | WEIGHT: 256 LBS | HEIGHT: 69 IN

## 2018-04-12 PROCEDURE — 93017 CV STRESS TEST TRACING ONLY: CPT | Performed by: NUCLEAR MEDICINE

## 2018-04-12 PROCEDURE — 78452 HT MUSCLE IMAGE SPECT MULT: CPT

## 2018-04-12 PROCEDURE — 3430000000 HC RX DIAGNOSTIC RADIOPHARMACEUTICAL: Performed by: INTERNAL MEDICINE

## 2018-04-12 PROCEDURE — A9500 TC99M SESTAMIBI: HCPCS | Performed by: INTERNAL MEDICINE

## 2018-04-12 PROCEDURE — 6360000002 HC RX W HCPCS

## 2018-04-12 RX ADMIN — Medication 35 MILLICURIE: at 09:12

## 2018-04-12 RX ADMIN — Medication 10.4 MILLICURIE: at 08:06

## 2018-04-13 ENCOUNTER — TELEPHONE (OUTPATIENT)
Dept: INTERNAL MEDICINE CLINIC | Age: 63
End: 2018-04-13

## 2018-04-13 DIAGNOSIS — R94.31 ABNORMAL EKG: ICD-10-CM

## 2018-04-13 DIAGNOSIS — R07.9 CHEST PAIN, UNSPECIFIED TYPE: ICD-10-CM

## 2018-04-13 DIAGNOSIS — E03.9 HYPOTHYROIDISM, UNSPECIFIED TYPE: Primary | ICD-10-CM

## 2018-04-20 ENCOUNTER — HOSPITAL ENCOUNTER (OUTPATIENT)
Age: 63
Discharge: HOME | End: 2018-04-20
Payer: COMMERCIAL

## 2018-04-20 ENCOUNTER — TELEPHONE (OUTPATIENT)
Dept: INTERNAL MEDICINE CLINIC | Age: 63
End: 2018-04-20

## 2018-04-20 DIAGNOSIS — E03.9 HYPOTHYROIDISM, UNSPECIFIED TYPE: ICD-10-CM

## 2018-04-20 DIAGNOSIS — R94.31 ABNORMAL EKG: ICD-10-CM

## 2018-04-20 DIAGNOSIS — R07.9 CHEST PAIN, UNSPECIFIED TYPE: ICD-10-CM

## 2018-04-20 LAB
CHOLESTEROL, TOTAL: 167 MG/DL (ref 100–199)
HDLC SERPL-MCNC: 49 MG/DL
LDL CHOLESTEROL CALCULATED: 102 MG/DL
TRIGL SERPL-MCNC: 78 MG/DL (ref 0–199)

## 2018-04-20 PROCEDURE — 36415 COLL VENOUS BLD VENIPUNCTURE: CPT

## 2018-04-20 PROCEDURE — 80061 LIPID PANEL: CPT

## 2018-04-24 ENCOUNTER — OFFICE VISIT (OUTPATIENT)
Dept: INTERNAL MEDICINE CLINIC | Age: 63
End: 2018-04-24
Payer: COMMERCIAL

## 2018-04-24 VITALS
HEART RATE: 70 BPM | BODY MASS INDEX: 38.36 KG/M2 | DIASTOLIC BLOOD PRESSURE: 86 MMHG | HEIGHT: 69 IN | SYSTOLIC BLOOD PRESSURE: 140 MMHG | WEIGHT: 259 LBS

## 2018-04-24 DIAGNOSIS — I25.118 CORONARY ARTERY DISEASE OF NATIVE ARTERY OF NATIVE HEART WITH STABLE ANGINA PECTORIS (HCC): ICD-10-CM

## 2018-04-24 PROCEDURE — 99213 OFFICE O/P EST LOW 20 MIN: CPT | Performed by: INTERNAL MEDICINE

## 2018-04-24 RX ORDER — ATORVASTATIN CALCIUM 40 MG/1
40 TABLET, FILM COATED ORAL DAILY
Qty: 30 TABLET | Refills: 3 | Status: SHIPPED | OUTPATIENT
Start: 2018-04-24 | End: 2018-06-06 | Stop reason: ALTCHOICE

## 2018-04-24 RX ORDER — DILTIAZEM HYDROCHLORIDE 120 MG/1
120 CAPSULE, COATED, EXTENDED RELEASE ORAL DAILY
Qty: 30 CAPSULE | Refills: 3 | Status: SHIPPED | OUTPATIENT
Start: 2018-04-24 | End: 2018-06-06 | Stop reason: ALTCHOICE

## 2018-04-27 ENCOUNTER — TELEPHONE (OUTPATIENT)
Dept: CARDIOLOGY CLINIC | Age: 63
End: 2018-04-27

## 2018-04-27 ENCOUNTER — TELEPHONE (OUTPATIENT)
Dept: INTERNAL MEDICINE CLINIC | Age: 63
End: 2018-04-27

## 2018-04-27 DIAGNOSIS — R07.89 CHEST TIGHTNESS: ICD-10-CM

## 2018-04-27 DIAGNOSIS — R94.39 ABNORMAL STRESS TEST: Primary | ICD-10-CM

## 2018-05-01 ENCOUNTER — OFFICE VISIT (OUTPATIENT)
Dept: CARDIOLOGY CLINIC | Age: 63
End: 2018-05-01
Payer: COMMERCIAL

## 2018-05-01 VITALS
WEIGHT: 249.5 LBS | DIASTOLIC BLOOD PRESSURE: 74 MMHG | BODY MASS INDEX: 36.95 KG/M2 | HEIGHT: 69 IN | SYSTOLIC BLOOD PRESSURE: 138 MMHG | HEART RATE: 74 BPM

## 2018-05-01 DIAGNOSIS — I25.118 CORONARY ARTERY DISEASE OF NATIVE ARTERY OF NATIVE HEART WITH STABLE ANGINA PECTORIS (HCC): Primary | ICD-10-CM

## 2018-05-01 PROCEDURE — 99204 OFFICE O/P NEW MOD 45 MIN: CPT | Performed by: INTERNAL MEDICINE

## 2018-05-01 RX ORDER — METOPROLOL SUCCINATE 25 MG/1
25 TABLET, EXTENDED RELEASE ORAL DAILY
Qty: 30 TABLET | Refills: 3 | Status: SHIPPED | OUTPATIENT
Start: 2018-05-01 | End: 2018-07-02 | Stop reason: ALTCHOICE

## 2018-05-01 RX ORDER — NITROGLYCERIN 0.4 MG/1
0.4 TABLET SUBLINGUAL EVERY 5 MIN PRN
Qty: 25 TABLET | Refills: 3 | Status: SHIPPED | OUTPATIENT
Start: 2018-05-01 | End: 2018-06-07

## 2018-05-01 RX ORDER — ISOSORBIDE MONONITRATE 60 MG/1
60 TABLET, EXTENDED RELEASE ORAL DAILY
Qty: 30 TABLET | Refills: 3 | Status: SHIPPED | OUTPATIENT
Start: 2018-05-01 | End: 2018-05-07 | Stop reason: SINTOL

## 2018-05-01 ASSESSMENT — ENCOUNTER SYMPTOMS
ABDOMINAL DISTENTION: 0
SHORTNESS OF BREATH: 0
CHEST TIGHTNESS: 0
EYE DISCHARGE: 0
EYE PAIN: 0
APNEA: 0
NAUSEA: 0
ABDOMINAL PAIN: 0
SINUS PRESSURE: 0
BLOOD IN STOOL: 0
COUGH: 0
SORE THROAT: 0
EYE ITCHING: 0
CONSTIPATION: 0
BACK PAIN: 0
DIARRHEA: 0
EYE REDNESS: 0

## 2018-05-07 ENCOUNTER — APPOINTMENT (OUTPATIENT)
Dept: CT IMAGING | Age: 63
End: 2018-05-07
Payer: COMMERCIAL

## 2018-05-07 ENCOUNTER — TELEPHONE (OUTPATIENT)
Dept: CARDIOLOGY CLINIC | Age: 63
End: 2018-05-07

## 2018-05-07 ENCOUNTER — HOSPITAL ENCOUNTER (EMERGENCY)
Age: 63
Discharge: HOME | End: 2018-05-07
Attending: EMERGENCY MEDICINE
Payer: COMMERCIAL

## 2018-05-07 VITALS
HEIGHT: 69 IN | DIASTOLIC BLOOD PRESSURE: 60 MMHG | TEMPERATURE: 98.7 F | BODY MASS INDEX: 37.47 KG/M2 | HEART RATE: 66 BPM | SYSTOLIC BLOOD PRESSURE: 120 MMHG | RESPIRATION RATE: 18 BRPM | WEIGHT: 253 LBS | OXYGEN SATURATION: 97 %

## 2018-05-07 DIAGNOSIS — G44.89 OTHER HEADACHE SYNDROME: Primary | ICD-10-CM

## 2018-05-07 DIAGNOSIS — T50.905A MEDICATION SIDE EFFECT, INITIAL ENCOUNTER: ICD-10-CM

## 2018-05-07 LAB
ALBUMIN SERPL-MCNC: 3.8 G/DL (ref 3.5–5.1)
ALP BLD-CCNC: 86 U/L (ref 38–126)
ALT SERPL-CCNC: 16 U/L (ref 11–66)
ANION GAP SERPL CALCULATED.3IONS-SCNC: 11 MEQ/L (ref 8–16)
ANISOCYTOSIS: ABNORMAL
AST SERPL-CCNC: 16 U/L (ref 5–40)
BASOPHILS # BLD: 0.3 %
BASOPHILS ABSOLUTE: 0 THOU/MM3 (ref 0–0.1)
BILIRUB SERPL-MCNC: 0.5 MG/DL (ref 0.3–1.2)
BUN BLDV-MCNC: 14 MG/DL (ref 7–22)
CALCIUM SERPL-MCNC: 8.8 MG/DL (ref 8.5–10.5)
CHLORIDE BLD-SCNC: 101 MEQ/L (ref 98–111)
CO2: 26 MEQ/L (ref 23–33)
CREAT SERPL-MCNC: 0.6 MG/DL (ref 0.4–1.2)
EOSINOPHIL # BLD: 0.9 %
EOSINOPHILS ABSOLUTE: 0.1 THOU/MM3 (ref 0–0.4)
GFR SERPL CREATININE-BSD FRML MDRD: > 90 ML/MIN/1.73M2
GLUCOSE BLD-MCNC: 122 MG/DL (ref 70–108)
HCT VFR BLD CALC: 37.2 % (ref 37–47)
HEMOGLOBIN: 12.6 GM/DL (ref 12–16)
LYMPHOCYTES # BLD: 17.6 %
LYMPHOCYTES ABSOLUTE: 1.3 THOU/MM3 (ref 1–4.8)
MAGNESIUM: 2.1 MG/DL (ref 1.6–2.4)
MCH RBC QN AUTO: 29.9 PG (ref 27–31)
MCHC RBC AUTO-ENTMCNC: 33.9 GM/DL (ref 33–37)
MCV RBC AUTO: 88.3 FL (ref 81–99)
MONOCYTES # BLD: 6.5 %
MONOCYTES ABSOLUTE: 0.5 THOU/MM3 (ref 0.4–1.3)
NUCLEATED RED BLOOD CELLS: 0 /100 WBC
OSMOLALITY CALCULATION: 277.5 MOSMOL/KG (ref 275–300)
PDW BLD-RTO: 14.6 % (ref 11.5–14.5)
PLATELET # BLD: 240 THOU/MM3 (ref 130–400)
PMV BLD AUTO: 8.9 FL (ref 7.4–10.4)
POTASSIUM SERPL-SCNC: 4.2 MEQ/L (ref 3.5–5.2)
RBC # BLD: 4.21 MILL/MM3 (ref 4.2–5.4)
SEG NEUTROPHILS: 74.7 %
SEGMENTED NEUTROPHILS ABSOLUTE COUNT: 5.5 THOU/MM3 (ref 1.8–7.7)
SODIUM BLD-SCNC: 138 MEQ/L (ref 135–145)
TOTAL PROTEIN: 6.7 G/DL (ref 6.1–8)
TROPONIN T: < 0.01 NG/ML
WBC # BLD: 7.4 THOU/MM3 (ref 4.8–10.8)

## 2018-05-07 PROCEDURE — 70450 CT HEAD/BRAIN W/O DYE: CPT

## 2018-05-07 PROCEDURE — 83735 ASSAY OF MAGNESIUM: CPT

## 2018-05-07 PROCEDURE — 6370000000 HC RX 637 (ALT 250 FOR IP): Performed by: EMERGENCY MEDICINE

## 2018-05-07 PROCEDURE — 85025 COMPLETE CBC W/AUTO DIFF WBC: CPT

## 2018-05-07 PROCEDURE — 99284 EMERGENCY DEPT VISIT MOD MDM: CPT

## 2018-05-07 PROCEDURE — 80053 COMPREHEN METABOLIC PANEL: CPT

## 2018-05-07 PROCEDURE — 36415 COLL VENOUS BLD VENIPUNCTURE: CPT

## 2018-05-07 PROCEDURE — 84484 ASSAY OF TROPONIN QUANT: CPT

## 2018-05-07 RX ORDER — HYDROCODONE BITARTRATE AND ACETAMINOPHEN 5; 325 MG/1; MG/1
1 TABLET ORAL ONCE
Status: COMPLETED | OUTPATIENT
Start: 2018-05-07 | End: 2018-05-07

## 2018-05-07 RX ADMIN — HYDROCODONE BITARTRATE AND ACETAMINOPHEN 1 TABLET: 5; 325 TABLET ORAL at 15:31

## 2018-05-07 ASSESSMENT — ENCOUNTER SYMPTOMS
SORE THROAT: 0
DIARRHEA: 0
COUGH: 0
WHEEZING: 0
SHORTNESS OF BREATH: 0
EYE PAIN: 0
NAUSEA: 1
BACK PAIN: 0
ABDOMINAL PAIN: 0
EYE DISCHARGE: 0
VOMITING: 0
RHINORRHEA: 0

## 2018-05-07 ASSESSMENT — PAIN SCALES - GENERAL
PAINLEVEL_OUTOF10: 1
PAINLEVEL_OUTOF10: 6
PAINLEVEL_OUTOF10: 6

## 2018-05-07 ASSESSMENT — PAIN DESCRIPTION - PAIN TYPE: TYPE: ACUTE PAIN

## 2018-05-07 ASSESSMENT — PAIN DESCRIPTION - LOCATION: LOCATION: HEAD

## 2018-05-07 ASSESSMENT — PAIN DESCRIPTION - DESCRIPTORS: DESCRIPTORS: PRESSURE

## 2018-05-11 ENCOUNTER — TELEPHONE (OUTPATIENT)
Dept: CARDIOLOGY CLINIC | Age: 63
End: 2018-05-11

## 2018-05-11 DIAGNOSIS — I25.118 CORONARY ARTERY DISEASE OF NATIVE ARTERY OF NATIVE HEART WITH STABLE ANGINA PECTORIS (HCC): Primary | ICD-10-CM

## 2018-05-11 DIAGNOSIS — R07.89 CHEST PRESSURE: ICD-10-CM

## 2018-05-11 DIAGNOSIS — R53.83 FATIGUE, UNSPECIFIED TYPE: ICD-10-CM

## 2018-05-17 ENCOUNTER — PREP FOR PROCEDURE (OUTPATIENT)
Dept: CARDIOLOGY | Age: 63
End: 2018-05-17

## 2018-05-17 RX ORDER — NITROGLYCERIN 0.4 MG/1
0.4 TABLET SUBLINGUAL EVERY 5 MIN PRN
Status: CANCELLED | OUTPATIENT
Start: 2018-05-17

## 2018-05-17 RX ORDER — ASPIRIN 325 MG
325 TABLET ORAL ONCE
Status: CANCELLED | OUTPATIENT
Start: 2018-05-17 | End: 2018-05-17

## 2018-05-17 RX ORDER — SODIUM CHLORIDE 0.9 % (FLUSH) 0.9 %
10 SYRINGE (ML) INJECTION PRN
Status: CANCELLED | OUTPATIENT
Start: 2018-05-17

## 2018-05-17 RX ORDER — SODIUM CHLORIDE 9 MG/ML
INJECTION, SOLUTION INTRAVENOUS CONTINUOUS
Status: CANCELLED | OUTPATIENT
Start: 2018-05-17

## 2018-05-17 RX ORDER — SODIUM CHLORIDE 0.9 % (FLUSH) 0.9 %
10 SYRINGE (ML) INJECTION EVERY 12 HOURS SCHEDULED
Status: CANCELLED | OUTPATIENT
Start: 2018-05-17

## 2018-05-18 ENCOUNTER — HOSPITAL ENCOUNTER (OUTPATIENT)
Dept: INPATIENT UNIT | Age: 63
Discharge: HOME | End: 2018-05-18
Attending: INTERNAL MEDICINE | Admitting: INTERNAL MEDICINE
Payer: COMMERCIAL

## 2018-05-18 VITALS
OXYGEN SATURATION: 98 % | WEIGHT: 249 LBS | DIASTOLIC BLOOD PRESSURE: 50 MMHG | RESPIRATION RATE: 13 BRPM | TEMPERATURE: 97.8 F | SYSTOLIC BLOOD PRESSURE: 105 MMHG | HEIGHT: 69 IN | BODY MASS INDEX: 36.88 KG/M2 | HEART RATE: 64 BPM

## 2018-05-18 LAB
ABO: NORMAL
ANION GAP SERPL CALCULATED.3IONS-SCNC: 12 MEQ/L (ref 8–16)
ANTIBODY SCREEN: NORMAL
APTT: 31.3 SECONDS (ref 22–38)
BUN BLDV-MCNC: 12 MG/DL (ref 7–22)
CALCIUM SERPL-MCNC: 9.4 MG/DL (ref 8.5–10.5)
CHLORIDE BLD-SCNC: 100 MEQ/L (ref 98–111)
CO2: 28 MEQ/L (ref 23–33)
CREAT SERPL-MCNC: 0.7 MG/DL (ref 0.4–1.2)
EKG ATRIAL RATE: 59 BPM
EKG P AXIS: 79 DEGREES
EKG P-R INTERVAL: 220 MS
EKG Q-T INTERVAL: 412 MS
EKG QRS DURATION: 98 MS
EKG QTC CALCULATION (BAZETT): 407 MS
EKG R AXIS: -12 DEGREES
EKG T AXIS: 19 DEGREES
EKG VENTRICULAR RATE: 59 BPM
GFR SERPL CREATININE-BSD FRML MDRD: 85 ML/MIN/1.73M2
GLUCOSE BLD-MCNC: 98 MG/DL (ref 70–108)
HCT VFR BLD CALC: 41.3 % (ref 37–47)
HEMOGLOBIN: 13.8 GM/DL (ref 12–16)
INR BLD: 1.08 (ref 0.85–1.13)
MCH RBC QN AUTO: 29.8 PG (ref 27–31)
MCHC RBC AUTO-ENTMCNC: 33.5 GM/DL (ref 33–37)
MCV RBC AUTO: 89.1 FL (ref 81–99)
PDW BLD-RTO: 13.9 % (ref 11.5–14.5)
PLATELET # BLD: 284 THOU/MM3 (ref 130–400)
PMV BLD AUTO: 9.3 FL (ref 7.4–10.4)
POTASSIUM REFLEX MAGNESIUM: 4.2 MEQ/L (ref 3.5–5.2)
RBC # BLD: 4.64 MILL/MM3 (ref 4.2–5.4)
RH FACTOR: NORMAL
SODIUM BLD-SCNC: 140 MEQ/L (ref 135–145)
WBC # BLD: 7.1 THOU/MM3 (ref 4.8–10.8)

## 2018-05-18 PROCEDURE — 93458 L HRT ARTERY/VENTRICLE ANGIO: CPT

## 2018-05-18 PROCEDURE — 86900 BLOOD TYPING SEROLOGIC ABO: CPT

## 2018-05-18 PROCEDURE — 2500000003 HC RX 250 WO HCPCS

## 2018-05-18 PROCEDURE — 93458 L HRT ARTERY/VENTRICLE ANGIO: CPT | Performed by: INTERNAL MEDICINE

## 2018-05-18 PROCEDURE — C1760 CLOSURE DEV, VASC: HCPCS

## 2018-05-18 PROCEDURE — 2580000003 HC RX 258: Performed by: PHYSICIAN ASSISTANT

## 2018-05-18 PROCEDURE — 2780000010 HC IMPLANT OTHER

## 2018-05-18 PROCEDURE — 85730 THROMBOPLASTIN TIME PARTIAL: CPT

## 2018-05-18 PROCEDURE — 85027 COMPLETE CBC AUTOMATED: CPT

## 2018-05-18 PROCEDURE — 2720000010 HC SURG SUPPLY STERILE

## 2018-05-18 PROCEDURE — 86850 RBC ANTIBODY SCREEN: CPT

## 2018-05-18 PROCEDURE — 80048 BASIC METABOLIC PNL TOTAL CA: CPT

## 2018-05-18 PROCEDURE — 36415 COLL VENOUS BLD VENIPUNCTURE: CPT

## 2018-05-18 PROCEDURE — 86901 BLOOD TYPING SEROLOGIC RH(D): CPT

## 2018-05-18 PROCEDURE — 85610 PROTHROMBIN TIME: CPT

## 2018-05-18 PROCEDURE — C1894 INTRO/SHEATH, NON-LASER: HCPCS

## 2018-05-18 PROCEDURE — C1725 CATH, TRANSLUMIN NON-LASER: HCPCS

## 2018-05-18 PROCEDURE — 93005 ELECTROCARDIOGRAM TRACING: CPT | Performed by: PHYSICIAN ASSISTANT

## 2018-05-18 PROCEDURE — C1769 GUIDE WIRE: HCPCS

## 2018-05-18 PROCEDURE — 6360000002 HC RX W HCPCS

## 2018-05-18 RX ORDER — SODIUM CHLORIDE 0.9 % (FLUSH) 0.9 %
10 SYRINGE (ML) INJECTION PRN
Status: DISCONTINUED | OUTPATIENT
Start: 2018-05-18 | End: 2018-05-18 | Stop reason: SDUPTHER

## 2018-05-18 RX ORDER — SODIUM CHLORIDE 9 MG/ML
INJECTION, SOLUTION INTRAVENOUS CONTINUOUS
Status: DISCONTINUED | OUTPATIENT
Start: 2018-05-18 | End: 2018-05-18 | Stop reason: HOSPADM

## 2018-05-18 RX ORDER — ASPIRIN 325 MG
325 TABLET ORAL ONCE
Status: DISCONTINUED | OUTPATIENT
Start: 2018-05-18 | End: 2018-05-18 | Stop reason: HOSPADM

## 2018-05-18 RX ORDER — ATROPINE SULFATE 0.4 MG/ML
0.5 AMPUL (ML) INJECTION
Status: DISCONTINUED | OUTPATIENT
Start: 2018-05-18 | End: 2018-05-18 | Stop reason: HOSPADM

## 2018-05-18 RX ORDER — SODIUM CHLORIDE 0.9 % (FLUSH) 0.9 %
10 SYRINGE (ML) INJECTION EVERY 12 HOURS SCHEDULED
Status: DISCONTINUED | OUTPATIENT
Start: 2018-05-18 | End: 2018-05-18 | Stop reason: SDUPTHER

## 2018-05-18 RX ORDER — SODIUM CHLORIDE 0.9 % (FLUSH) 0.9 %
10 SYRINGE (ML) INJECTION EVERY 12 HOURS SCHEDULED
Status: DISCONTINUED | OUTPATIENT
Start: 2018-05-18 | End: 2018-05-18 | Stop reason: HOSPADM

## 2018-05-18 RX ORDER — SODIUM CHLORIDE 9 MG/ML
INJECTION, SOLUTION INTRAVENOUS CONTINUOUS
Status: DISCONTINUED | OUTPATIENT
Start: 2018-05-18 | End: 2018-05-18 | Stop reason: SDUPTHER

## 2018-05-18 RX ORDER — NITROGLYCERIN 0.4 MG/1
0.4 TABLET SUBLINGUAL EVERY 5 MIN PRN
Status: DISCONTINUED | OUTPATIENT
Start: 2018-05-18 | End: 2018-05-18 | Stop reason: HOSPADM

## 2018-05-18 RX ORDER — SODIUM CHLORIDE 0.9 % (FLUSH) 0.9 %
10 SYRINGE (ML) INJECTION PRN
Status: DISCONTINUED | OUTPATIENT
Start: 2018-05-18 | End: 2018-05-18 | Stop reason: HOSPADM

## 2018-05-18 RX ORDER — ONDANSETRON 2 MG/ML
4 INJECTION INTRAMUSCULAR; INTRAVENOUS EVERY 6 HOURS PRN
Status: DISCONTINUED | OUTPATIENT
Start: 2018-05-18 | End: 2018-05-18 | Stop reason: HOSPADM

## 2018-05-18 RX ORDER — ACETAMINOPHEN 325 MG/1
650 TABLET ORAL EVERY 4 HOURS PRN
Status: DISCONTINUED | OUTPATIENT
Start: 2018-05-18 | End: 2018-05-18 | Stop reason: HOSPADM

## 2018-05-18 RX ADMIN — SODIUM CHLORIDE: 9 INJECTION, SOLUTION INTRAVENOUS at 14:56

## 2018-05-30 ENCOUNTER — OFFICE VISIT (OUTPATIENT)
Dept: INTERNAL MEDICINE CLINIC | Age: 63
End: 2018-05-30
Payer: COMMERCIAL

## 2018-05-30 VITALS
HEIGHT: 69 IN | DIASTOLIC BLOOD PRESSURE: 58 MMHG | BODY MASS INDEX: 36.29 KG/M2 | WEIGHT: 245 LBS | SYSTOLIC BLOOD PRESSURE: 116 MMHG | HEART RATE: 70 BPM

## 2018-05-30 DIAGNOSIS — R07.89 CHEST TIGHTNESS: Primary | ICD-10-CM

## 2018-05-30 PROCEDURE — 99213 OFFICE O/P EST LOW 20 MIN: CPT | Performed by: INTERNAL MEDICINE

## 2018-05-30 RX ORDER — SULFAMETHOXAZOLE AND TRIMETHOPRIM 800; 160 MG/1; MG/1
1 TABLET ORAL 2 TIMES DAILY
COMMUNITY
End: 2018-06-06 | Stop reason: ALTCHOICE

## 2018-06-06 ENCOUNTER — OFFICE VISIT (OUTPATIENT)
Dept: CARDIOLOGY CLINIC | Age: 63
End: 2018-06-06
Payer: COMMERCIAL

## 2018-06-06 VITALS
BODY MASS INDEX: 35.78 KG/M2 | DIASTOLIC BLOOD PRESSURE: 86 MMHG | HEIGHT: 69 IN | WEIGHT: 241.6 LBS | SYSTOLIC BLOOD PRESSURE: 136 MMHG | HEART RATE: 72 BPM

## 2018-06-06 DIAGNOSIS — R07.9 CHEST PAIN, UNSPECIFIED TYPE: ICD-10-CM

## 2018-06-06 DIAGNOSIS — I10 ESSENTIAL HYPERTENSION: Primary | ICD-10-CM

## 2018-06-06 PROCEDURE — 99212 OFFICE O/P EST SF 10 MIN: CPT | Performed by: INTERNAL MEDICINE

## 2018-06-26 ENCOUNTER — HOSPITAL ENCOUNTER (OUTPATIENT)
Dept: GENERAL RADIOLOGY | Age: 63
Discharge: HOME | End: 2018-06-26
Payer: COMMERCIAL

## 2018-06-26 ENCOUNTER — HOSPITAL ENCOUNTER (OUTPATIENT)
Age: 63
Discharge: HOME | End: 2018-06-26
Payer: COMMERCIAL

## 2018-06-26 DIAGNOSIS — M25.552 LEFT HIP PAIN: ICD-10-CM

## 2018-06-26 LAB
ALBUMIN SERPL-MCNC: 4.3 G/DL (ref 3.5–5.1)
ALP BLD-CCNC: 82 U/L (ref 38–126)
ALT SERPL-CCNC: 21 U/L (ref 11–66)
ANION GAP SERPL CALCULATED.3IONS-SCNC: 12 MEQ/L (ref 8–16)
AST SERPL-CCNC: 25 U/L (ref 5–40)
BASOPHILS # BLD: 0.9 %
BASOPHILS ABSOLUTE: 0.1 THOU/MM3 (ref 0–0.1)
BILIRUB SERPL-MCNC: 0.4 MG/DL (ref 0.3–1.2)
BUN BLDV-MCNC: 12 MG/DL (ref 7–22)
CALCIUM SERPL-MCNC: 9.9 MG/DL (ref 8.5–10.5)
CHLORIDE BLD-SCNC: 101 MEQ/L (ref 98–111)
CO2: 27 MEQ/L (ref 23–33)
CREAT SERPL-MCNC: 0.7 MG/DL (ref 0.4–1.2)
EOSINOPHIL # BLD: 4.7 %
EOSINOPHILS ABSOLUTE: 0.3 THOU/MM3 (ref 0–0.4)
ERYTHROCYTE [DISTWIDTH] IN BLOOD BY AUTOMATED COUNT: 14.1 % (ref 11.5–14.5)
ERYTHROCYTE [DISTWIDTH] IN BLOOD BY AUTOMATED COUNT: 45.6 FL (ref 35–45)
GFR SERPL CREATININE-BSD FRML MDRD: 85 ML/MIN/1.73M2
GLUCOSE BLD-MCNC: 88 MG/DL (ref 70–108)
HCT VFR BLD CALC: 42.8 % (ref 37–47)
HEMOGLOBIN: 14 GM/DL (ref 12–16)
IMMATURE GRANS (ABS): 0.02 THOU/MM3 (ref 0–0.07)
IMMATURE GRANULOCYTES: 0.3 %
LYMPHOCYTES # BLD: 30.7 %
LYMPHOCYTES ABSOLUTE: 2.1 THOU/MM3 (ref 1–4.8)
MCH RBC QN AUTO: 29.2 PG (ref 26–33)
MCHC RBC AUTO-ENTMCNC: 32.7 GM/DL (ref 32.2–35.5)
MCV RBC AUTO: 89.2 FL (ref 81–99)
MONOCYTES # BLD: 8.2 %
MONOCYTES ABSOLUTE: 0.6 THOU/MM3 (ref 0.4–1.3)
NUCLEATED RED BLOOD CELLS: 0 /100 WBC
PLATELET # BLD: 267 THOU/MM3 (ref 130–400)
PMV BLD AUTO: 11 FL (ref 9.4–12.4)
POTASSIUM SERPL-SCNC: 4 MEQ/L (ref 3.5–5.2)
RBC # BLD: 4.8 MILL/MM3 (ref 4.2–5.4)
SEG NEUTROPHILS: 55.2 %
SEGMENTED NEUTROPHILS ABSOLUTE COUNT: 3.8 THOU/MM3 (ref 1.8–7.7)
SODIUM BLD-SCNC: 140 MEQ/L (ref 135–145)
TOTAL PROTEIN: 7.3 G/DL (ref 6.1–8)
WBC # BLD: 6.8 THOU/MM3 (ref 4.8–10.8)

## 2018-06-26 PROCEDURE — 85025 COMPLETE CBC W/AUTO DIFF WBC: CPT

## 2018-06-26 PROCEDURE — 80053 COMPREHEN METABOLIC PANEL: CPT

## 2018-06-26 PROCEDURE — 73502 X-RAY EXAM HIP UNI 2-3 VIEWS: CPT

## 2018-06-26 PROCEDURE — 36415 COLL VENOUS BLD VENIPUNCTURE: CPT

## 2018-07-03 ENCOUNTER — HOSPITAL ENCOUNTER (OUTPATIENT)
Dept: ULTRASOUND IMAGING | Age: 63
Discharge: HOME | End: 2018-07-03
Payer: COMMERCIAL

## 2018-07-03 DIAGNOSIS — R31.9 HEMATURIA, UNSPECIFIED TYPE: ICD-10-CM

## 2018-07-03 DIAGNOSIS — R10.9 FLANK PAIN: ICD-10-CM

## 2018-07-03 DIAGNOSIS — R10.30 LOWER ABDOMINAL PAIN: ICD-10-CM

## 2018-07-03 PROCEDURE — 76770 US EXAM ABDO BACK WALL COMP: CPT

## 2018-07-05 ENCOUNTER — HOSPITAL ENCOUNTER (OUTPATIENT)
Age: 63
Discharge: HOME | End: 2018-07-05
Payer: COMMERCIAL

## 2018-07-05 ENCOUNTER — HOSPITAL ENCOUNTER (OUTPATIENT)
Dept: GENERAL RADIOLOGY | Age: 63
Discharge: HOME | End: 2018-07-05
Payer: COMMERCIAL

## 2018-07-05 DIAGNOSIS — R30.0 DYSURIA: ICD-10-CM

## 2018-07-05 DIAGNOSIS — K59.09 CHRONIC CONSTIPATION: ICD-10-CM

## 2018-07-05 DIAGNOSIS — R10.2 SUPRAPUBIC PAIN: ICD-10-CM

## 2018-07-05 PROCEDURE — 74018 RADEX ABDOMEN 1 VIEW: CPT

## 2018-07-24 ENCOUNTER — HOSPITAL ENCOUNTER (OUTPATIENT)
Dept: ULTRASOUND IMAGING | Age: 63
Discharge: HOME | End: 2018-07-24
Payer: COMMERCIAL

## 2018-07-24 DIAGNOSIS — R10.9 ABDOMINAL PAIN, UNSPECIFIED ABDOMINAL LOCATION: ICD-10-CM

## 2018-07-24 PROCEDURE — 76705 ECHO EXAM OF ABDOMEN: CPT

## 2019-01-26 ENCOUNTER — HOSPITAL ENCOUNTER (EMERGENCY)
Age: 64
Discharge: HOME | End: 2019-01-26
Payer: COMMERCIAL

## 2019-01-26 VITALS
BODY MASS INDEX: 36.03 KG/M2 | SYSTOLIC BLOOD PRESSURE: 139 MMHG | TEMPERATURE: 97.9 F | OXYGEN SATURATION: 99 % | WEIGHT: 244 LBS | RESPIRATION RATE: 16 BRPM | DIASTOLIC BLOOD PRESSURE: 72 MMHG | HEART RATE: 69 BPM

## 2019-01-26 DIAGNOSIS — N30.00 ACUTE CYSTITIS WITHOUT HEMATURIA: Primary | ICD-10-CM

## 2019-01-26 LAB
BILIRUBIN URINE: NEGATIVE
BLOOD, URINE: ABNORMAL
CHARACTER, URINE: CLEAR
COLOR: YELLOW
GLUCOSE, URINE: NEGATIVE MG/DL
KETONES, URINE: NEGATIVE
LEUKOCYTES, UA: ABNORMAL
NITRATE, UA: NEGATIVE
PH UA: 7 (ref 5–9)
PROTEIN UA: NEGATIVE MG/DL
REFLEX TO URINE C & S: ABNORMAL
SPECIFIC GRAVITY UA: 1.01 (ref 1–1.03)
UROBILINOGEN, URINE: 0.2 EU/DL (ref 0–1)

## 2019-01-26 PROCEDURE — 81003 URINALYSIS AUTO W/O SCOPE: CPT

## 2019-01-26 PROCEDURE — 99213 OFFICE O/P EST LOW 20 MIN: CPT | Performed by: NURSE PRACTITIONER

## 2019-01-26 PROCEDURE — 99214 OFFICE O/P EST MOD 30 MIN: CPT

## 2019-01-26 PROCEDURE — 87086 URINE CULTURE/COLONY COUNT: CPT

## 2019-01-26 RX ORDER — NITROFURANTOIN 25; 75 MG/1; MG/1
100 CAPSULE ORAL 2 TIMES DAILY
Qty: 10 CAPSULE | Refills: 0 | Status: SHIPPED | OUTPATIENT
Start: 2019-01-26 | End: 2019-01-31

## 2019-01-26 ASSESSMENT — ENCOUNTER SYMPTOMS
COUGH: 0
SORE THROAT: 0
DIARRHEA: 0
WHEEZING: 0
BACK PAIN: 1
SHORTNESS OF BREATH: 0
NAUSEA: 0
CONSTIPATION: 0
ABDOMINAL PAIN: 0

## 2019-01-26 ASSESSMENT — PAIN DESCRIPTION - DESCRIPTORS: DESCRIPTORS: PRESSURE;ACHING

## 2019-01-26 ASSESSMENT — PAIN DESCRIPTION - PAIN TYPE: TYPE: ACUTE PAIN

## 2019-01-26 ASSESSMENT — PAIN DESCRIPTION - FREQUENCY: FREQUENCY: CONTINUOUS

## 2019-01-26 ASSESSMENT — PAIN DESCRIPTION - ORIENTATION: ORIENTATION: LOWER

## 2019-01-26 ASSESSMENT — PAIN SCALES - GENERAL: PAINLEVEL_OUTOF10: 5

## 2019-01-26 ASSESSMENT — PAIN DESCRIPTION - LOCATION: LOCATION: ABDOMEN

## 2019-01-26 ASSESSMENT — PAIN - FUNCTIONAL ASSESSMENT: PAIN_FUNCTIONAL_ASSESSMENT: ACTIVITIES ARE NOT PREVENTED

## 2019-01-27 LAB
ORGANISM: ABNORMAL
URINE CULTURE REFLEX: ABNORMAL

## 2019-01-31 ENCOUNTER — HOSPITAL ENCOUNTER (OUTPATIENT)
Age: 64
Setting detail: SPECIMEN
Discharge: HOME | End: 2019-01-31
Payer: COMMERCIAL

## 2019-01-31 LAB
-: NORMAL
AMORPHOUS: NORMAL
BACTERIA: NORMAL
BILIRUBIN URINE: NEGATIVE
CASTS UA: NORMAL /LPF (ref 0–8)
COLOR: YELLOW
COMMENT UA: ABNORMAL
CRYSTALS, UA: NORMAL /HPF
EPITHELIAL CELLS UA: NORMAL /HPF (ref 0–5)
GLUCOSE URINE: NEGATIVE
KETONES, URINE: NEGATIVE
LEUKOCYTE ESTERASE, URINE: ABNORMAL
MUCUS: NORMAL
NITRITE, URINE: NEGATIVE
OTHER OBSERVATIONS UA: NORMAL
PH UA: 8 (ref 5–8)
PROTEIN UA: NEGATIVE
RBC UA: NORMAL /HPF (ref 0–4)
RENAL EPITHELIAL, UA: NORMAL /HPF
SPECIFIC GRAVITY UA: 1.01 (ref 1–1.03)
TRICHOMONAS: NORMAL
TURBIDITY: CLEAR
URINE HGB: NEGATIVE
UROBILINOGEN, URINE: NORMAL
WBC UA: NORMAL /HPF (ref 0–5)
YEAST: NORMAL

## 2019-02-01 LAB
CULTURE: NORMAL
Lab: NORMAL
SPECIMEN DESCRIPTION: NORMAL
STATUS: NORMAL

## 2019-03-13 ENCOUNTER — HOSPITAL ENCOUNTER (OUTPATIENT)
Age: 64
Setting detail: SPECIMEN
Discharge: HOME | End: 2019-03-13
Payer: COMMERCIAL

## 2019-03-13 LAB — TSH SERPL DL<=0.05 MIU/L-ACNC: 2.79 MIU/L (ref 0.3–5)

## 2019-04-05 ENCOUNTER — HOSPITAL ENCOUNTER (OUTPATIENT)
Age: 64
Setting detail: SPECIMEN
Discharge: HOME | End: 2019-04-05
Payer: COMMERCIAL

## 2019-04-06 LAB
CULTURE: NORMAL
Lab: NORMAL
SPECIMEN DESCRIPTION: NORMAL

## 2019-04-13 ENCOUNTER — HOSPITAL ENCOUNTER (EMERGENCY)
Age: 64
Discharge: HOME | End: 2019-04-13
Payer: COMMERCIAL

## 2019-04-13 ENCOUNTER — APPOINTMENT (OUTPATIENT)
Dept: CT IMAGING | Age: 64
End: 2019-04-13
Payer: COMMERCIAL

## 2019-04-13 VITALS
OXYGEN SATURATION: 100 % | RESPIRATION RATE: 16 BRPM | HEIGHT: 69 IN | BODY MASS INDEX: 36.43 KG/M2 | TEMPERATURE: 97.6 F | HEART RATE: 68 BPM | SYSTOLIC BLOOD PRESSURE: 127 MMHG | WEIGHT: 246 LBS | DIASTOLIC BLOOD PRESSURE: 67 MMHG

## 2019-04-13 DIAGNOSIS — K64.8 OTHER HEMORRHOIDS: ICD-10-CM

## 2019-04-13 DIAGNOSIS — K59.09 OTHER CONSTIPATION: ICD-10-CM

## 2019-04-13 DIAGNOSIS — K83.8 DILATION OF COMMON BILE DUCT: ICD-10-CM

## 2019-04-13 DIAGNOSIS — R10.30 LOWER ABDOMINAL PAIN: Primary | ICD-10-CM

## 2019-04-13 LAB
ALBUMIN SERPL-MCNC: 4 G/DL (ref 3.5–5.1)
ALP BLD-CCNC: 91 U/L (ref 38–126)
ALT SERPL-CCNC: 18 U/L (ref 11–66)
ANION GAP SERPL CALCULATED.3IONS-SCNC: 13 MEQ/L (ref 8–16)
AST SERPL-CCNC: 20 U/L (ref 5–40)
BACTERIA: ABNORMAL /HPF
BASOPHILS # BLD: 0.8 %
BASOPHILS ABSOLUTE: 0 THOU/MM3 (ref 0–0.1)
BILIRUB SERPL-MCNC: 0.5 MG/DL (ref 0.3–1.2)
BILIRUBIN DIRECT: < 0.2 MG/DL (ref 0–0.3)
BILIRUBIN URINE: NEGATIVE
BLOOD, URINE: NEGATIVE
BUN BLDV-MCNC: 15 MG/DL (ref 7–22)
CA 19-9: 12 U/ML (ref 0–35)
CALCIUM SERPL-MCNC: 9.5 MG/DL (ref 8.5–10.5)
CASTS 2: ABNORMAL /LPF
CASTS UA: ABNORMAL /LPF
CHARACTER, URINE: CLEAR
CHLORIDE BLD-SCNC: 105 MEQ/L (ref 98–111)
CO2: 25 MEQ/L (ref 23–33)
COLOR: YELLOW
CREAT SERPL-MCNC: 0.6 MG/DL (ref 0.4–1.2)
CRYSTALS, UA: ABNORMAL
EOSINOPHIL # BLD: 2.9 %
EOSINOPHILS ABSOLUTE: 0.1 THOU/MM3 (ref 0–0.4)
EPITHELIAL CELLS, UA: ABNORMAL /HPF
ERYTHROCYTE [DISTWIDTH] IN BLOOD BY AUTOMATED COUNT: 14.1 % (ref 11.5–14.5)
ERYTHROCYTE [DISTWIDTH] IN BLOOD BY AUTOMATED COUNT: 47.4 FL (ref 35–45)
GFR SERPL CREATININE-BSD FRML MDRD: > 90 ML/MIN/1.73M2
GLUCOSE BLD-MCNC: 116 MG/DL (ref 70–108)
GLUCOSE URINE: NEGATIVE MG/DL
HCT VFR BLD CALC: 40.8 % (ref 37–47)
HEMOCCULT STL QL: NEGATIVE
HEMOGLOBIN: 13.3 GM/DL (ref 12–16)
IMMATURE GRANS (ABS): 0.02 THOU/MM3 (ref 0–0.07)
IMMATURE GRANULOCYTES: 0.4 %
KETONES, URINE: NEGATIVE
LEUKOCYTE ESTERASE, URINE: ABNORMAL
LIPASE: 39.1 U/L (ref 5.6–51.3)
LYMPHOCYTES # BLD: 29.4 %
LYMPHOCYTES ABSOLUTE: 1.4 THOU/MM3 (ref 1–4.8)
MAGNESIUM: 1.8 MG/DL (ref 1.6–2.4)
MCH RBC QN AUTO: 29.9 PG (ref 26–33)
MCHC RBC AUTO-ENTMCNC: 32.6 GM/DL (ref 32.2–35.5)
MCV RBC AUTO: 91.7 FL (ref 81–99)
MISCELLANEOUS 2: ABNORMAL
MONOCYTES # BLD: 10.1 %
MONOCYTES ABSOLUTE: 0.5 THOU/MM3 (ref 0.4–1.3)
NITRITE, URINE: NEGATIVE
NUCLEATED RED BLOOD CELLS: 0 /100 WBC
OSMOLALITY CALCULATION: 286.8 MOSMOL/KG (ref 275–300)
PH UA: 7 (ref 5–9)
PLATELET # BLD: 241 THOU/MM3 (ref 130–400)
PMV BLD AUTO: 10 FL (ref 9.4–12.4)
POTASSIUM SERPL-SCNC: 4.3 MEQ/L (ref 3.5–5.2)
PROTEIN UA: NEGATIVE
RBC # BLD: 4.45 MILL/MM3 (ref 4.2–5.4)
RBC URINE: ABNORMAL /HPF
RENAL EPITHELIAL, UA: ABNORMAL
SEG NEUTROPHILS: 56.4 %
SEGMENTED NEUTROPHILS ABSOLUTE COUNT: 2.8 THOU/MM3 (ref 1.8–7.7)
SODIUM BLD-SCNC: 143 MEQ/L (ref 135–145)
SPECIFIC GRAVITY, URINE: 1.01 (ref 1–1.03)
TOTAL PROTEIN: 6.4 G/DL (ref 6.1–8)
UROBILINOGEN, URINE: 0.2 EU/DL (ref 0–1)
WBC # BLD: 4.9 THOU/MM3 (ref 4.8–10.8)
WBC UA: ABNORMAL /HPF
YEAST: ABNORMAL

## 2019-04-13 PROCEDURE — 86301 IMMUNOASSAY TUMOR CA 19-9: CPT

## 2019-04-13 PROCEDURE — 82272 OCCULT BLD FECES 1-3 TESTS: CPT

## 2019-04-13 PROCEDURE — 6360000004 HC RX CONTRAST MEDICATION: Performed by: PHYSICIAN ASSISTANT

## 2019-04-13 PROCEDURE — 80053 COMPREHEN METABOLIC PANEL: CPT

## 2019-04-13 PROCEDURE — 82248 BILIRUBIN DIRECT: CPT

## 2019-04-13 PROCEDURE — 74177 CT ABD & PELVIS W/CONTRAST: CPT

## 2019-04-13 PROCEDURE — 81001 URINALYSIS AUTO W/SCOPE: CPT

## 2019-04-13 PROCEDURE — 83690 ASSAY OF LIPASE: CPT

## 2019-04-13 PROCEDURE — 36415 COLL VENOUS BLD VENIPUNCTURE: CPT

## 2019-04-13 PROCEDURE — 85025 COMPLETE CBC W/AUTO DIFF WBC: CPT

## 2019-04-13 PROCEDURE — 99284 EMERGENCY DEPT VISIT MOD MDM: CPT

## 2019-04-13 PROCEDURE — 83735 ASSAY OF MAGNESIUM: CPT

## 2019-04-13 RX ORDER — POLYETHYLENE GLYCOL 3350 17 G/17G
17 POWDER, FOR SOLUTION ORAL DAILY
Qty: 1 BOTTLE | Refills: 0 | Status: SHIPPED | OUTPATIENT
Start: 2019-04-13 | End: 2019-04-20

## 2019-04-13 RX ORDER — DIAPER,BRIEF,INFANT-TODD,DISP
EACH MISCELLANEOUS
Qty: 1 TUBE | Refills: 1 | Status: SHIPPED | OUTPATIENT
Start: 2019-04-13 | End: 2019-04-20

## 2019-04-13 RX ADMIN — IOPAMIDOL 80 ML: 755 INJECTION, SOLUTION INTRAVENOUS at 09:43

## 2019-04-13 ASSESSMENT — ENCOUNTER SYMPTOMS
BACK PAIN: 1
CONSTIPATION: 1
NAUSEA: 0
ABDOMINAL PAIN: 1
SORE THROAT: 0
COUGH: 0
DIARRHEA: 0
SHORTNESS OF BREATH: 0
WHEEZING: 0
ANAL BLEEDING: 1
BLOOD IN STOOL: 0
RHINORRHEA: 0
VOMITING: 0

## 2019-04-13 ASSESSMENT — PAIN DESCRIPTION - DESCRIPTORS: DESCRIPTORS: ACHING

## 2019-04-13 ASSESSMENT — PAIN DESCRIPTION - LOCATION: LOCATION: ABDOMEN;BACK

## 2019-04-13 ASSESSMENT — PAIN DESCRIPTION - FREQUENCY: FREQUENCY: CONTINUOUS

## 2019-04-13 ASSESSMENT — PAIN DESCRIPTION - PAIN TYPE: TYPE: ACUTE PAIN

## 2019-04-13 ASSESSMENT — PAIN SCALES - GENERAL: PAINLEVEL_OUTOF10: 3

## 2019-04-13 NOTE — ED NOTES
Pt ambulated to BR with steady gait. Clean catch urine specimen collected, labeled, & sent to lab for analysis.          Jarrett Bruno RN  04/13/19 5635

## 2019-04-13 NOTE — ED PROVIDER NOTES
[facility]  eMERGENCY dEPARTMENT eNCOUnter          [address] [facility]       Chief Complaint   Patient presents with    Abdominal Pain       Nurses Notes reviewed and I agree except as notedin the HPI. HISTORY OF PRESENT ILLNESS    Santos Robb is a 61 y.o. female with a past medical history of arthritis, Fibromyalgia, Constipation, hypothyroidism, and depression. Patient presents to the ED for evaluation of abdominal pain. Patient states that she developed bilateral lower abdominal pain this morning with associated lower back pain. Patient states that she has chronic constipation and thought her pain was secondary to being constipated. She attempted to have a bowel movement but only passed gas, and then noticed right red blood in the toilet without stool. Patient denies any nausea, vomiting, fevers, or chills. Patient has a history of hemorrhoids. Last bowel movement was Thursday after taking Linzess. No prior abdominal surgeries. Patient states her last colonoscopy was 3 years ago. No additional complaints or concerns at the time of initial evaluation. Andreina Rachel REVIEW OF SYSTEMS     Review of Systems   Constitutional: Negative for chills, diaphoresis and fever. HENT: Negative for congestion, rhinorrhea and sore throat. Respiratory: Negative for cough, shortness of breath and wheezing. Cardiovascular: Negative for chest pain, palpitations and leg swelling. Gastrointestinal: Positive for abdominal pain, anal bleeding and constipation. Negative for blood in stool, diarrhea, nausea and vomiting. Genitourinary: Negative for decreased urine volume, difficulty urinating, dysuria, frequency, hematuria and urgency. Musculoskeletal: Positive for back pain. Negative for arthralgias, joint swelling, myalgias and neck pain. Skin: Negative for rash. Neurological: Negative for dizziness, weakness, light-headedness, numbness and headaches.        PAST MEDICAL HISTORY    has a past medical history of Acid reflux, Arthritis, Cancer ([facility] [address] Cancer ([facility] [address] Cataracts, bilateral, Constipation, Depression, Epilepsy ([facility] [address] Fibromyalgia, Hearing loss, Hiatal hernia, Hoarseness, Hypothyroidism, Mono exposure, Night sweats, Osteoarthritis, Sinus infection, SOB (shortness of breath), and UTI (urinary tract infection). SURGICAL HISTORY      has a past surgical history that includes cardiovascular stress test (03-14-11); doppler echocardiography (03-18-11); Cholecystectomy (2013); Tonsillectomy; Colonoscopy; fracture surgery (2000?? ); Hysterectomy; shoulder surgery; other surgical history (29 OCT 2014); sinus surgery (06/2015); Colonoscopy (11/2016); and ekg 12 lead (historical) (04/2017). CURRENT MEDICATIONS     Discharge Medication List as of 4/13/2019 11:02 AM      CONTINUE these medications which have NOT CHANGED    Details   Esomeprazole Magnesium 20 MG TBEC Take 1 tablet by mouth daily Pt taking OTCHistorical Med      linaclotide (LINZESS) 145 MCG capsule Take 145 mcg by mouthHistorical Med      MAGNESIUM PO Take 1 tablet by mouth every morning Historical Med      Omega-3 Fatty Acids (FISH OIL PO) Take 1 capsule by mouth every morning Historical Med      Milk Thistle 1000 MG CAPS Take 1 capsule by mouth every morning Historical Med      Potassium 99 MG TABS Take 1 tablet by mouth daily Historical Med      GARLIC PO Take 1 tablet by mouth every morning Historical Med      loratadine (CLARITIN) 10 MG tablet Take 1 tablet by mouth daily, Disp-30 tablet, R-0Print      Cholecalciferol (VITAMIN D3) 5000 UNITS TABS Take 1 tablet by mouth every other day Historical Med      aspirin 81 MG chewable tablet Take 81 mg by mouth nightly Historical Med      hyoscyamine (LEVSIN) 0.125 MG tablet Take 0.125 mg by mouth every 4 hours as needed for Cramping. Historical Med      ARMOUR THYROID PO Take 45 mg by mouth every morning Historical Med      Coenzyme Q10 (CO Q 10 PO) Take 1 tablet by mouth every morning Historical Med ALLERGIES     has No Known Allergies. FAMILY HISTORY     indicated that her mother is . She indicated that her father is . She indicated that the status of her brother is unknown. She indicated that her maternal grandmother is . She indicated that her maternal grandfather is . She indicated that the status of her paternal grandfather is unknown. She indicated that the status of her maternal aunt is unknown. She indicated that the status of her neg hx is unknown.   family history includes Cancer in her father, maternal aunt, and paternal grandfather; Diabetes in her father and mother; Heart Disease in her maternal grandfather and mother; Heart Disease (age of onset: 52) in her maternal grandmother; High Blood Pressure in her brother; Other in her mother; Stroke (age of onset: 46) in her maternal grandfather. SOCIAL HISTORY      reports that she quit smoking about 3 years ago. Her smoking use included cigarettes. She has a 14.50 pack-year smoking history. She has never used smokeless tobacco. She reports that she drinks about 1.2 oz of alcohol per week. She reports that she has current or past drug history. Drug: Marijuana. PHYSICAL EXAM     INITIAL VITALS:  height is 5' 9\" (1.753 m) and weight is 246 lb (111.6 kg). Her oral temperature is 97.6 °F (36.4 °C). Her blood pressure is 127/67 and her pulse is 68. Her respiration is 16 and oxygen saturation is 100%. Physical Exam   Constitutional: She is oriented to person, place, and time. She appears well-developed and well-nourished. No distress. HENT:   Head: Normocephalic and atraumatic. Right Ear: External ear normal.   Left Ear: External ear normal.   Mouth/Throat: Oropharynx is clear and moist.   Eyes: Conjunctivae and EOM are normal.   Neck: Normal range of motion. Neck supple. Cardiovascular: Normal rate, regular rhythm, normal heart sounds and intact distal pulses. Exam reveals no friction rub.    No murmur head and a pancreatic head mass cannot be excluded. A nonemergent scheduled MRI/MRCP of the abdomen is recommended to further evaluate this. 8. Cholecystectomy. 9. The common duct and central intrahepatic biliary tree are dilated with the common duct measuring 1.5 cm in transverse dimension. 10. There is a 0.4 cm nonobstructing calculus within the midpole of the left kidney and a 1.5 cm cyst off the posterior margin of the midpole the left kidney measuring 15 Hounsfield units. **This report has been created using voice recognition software. It may contain minor errors which are inherent in voice recognition technology. **      Final report electronically signed by Dr. Natalie Lizama on 4/13/2019 9:59 AM           LABS:     Labs Reviewed   CBC WITH AUTO DIFFERENTIAL - Abnormal; Notable for the following components:       Result Value    RDW-SD 47.4 (*)     All other components within normal limits   BASIC METABOLIC PANEL - Abnormal; Notable for the following components:    Glucose 116 (*)     All other components within normal limits   URINE WITH REFLEXED MICRO - Abnormal; Notable for the following components:    Leukocyte Esterase, Urine SMALL (*)     All other components within normal limits   HEPATIC FUNCTION PANEL   LIPASE   MAGNESIUM   BLOOD OCCULT STOOL SCREEN #1   ANION GAP   OSMOLALITY   GLOMERULAR FILTRATION RATE, ESTIMATED   CANCER ANTIGEN 19-9       EMERGENCY DEPARTMENT COURSE:   Vitals:    Vitals:    04/13/19 0835 04/13/19 1100   BP: (!) 143/75 127/67   Pulse: 83 68   Resp: 16 16   Temp: 97.6 °F (36.4 °C)    TempSrc: Oral    SpO2: 99% 100%   Weight: 246 lb (111.6 kg)    Height: 5' 9\" (1.753 m)        8:46 AM:The patient was seen and evaluated. CRITICAL CARE:   None    CONSULTS:  None    PROCEDURES:  None     FINAL IMPRESSION      1. Lower abdominal pain    2. Dilation of common bile duct    3. Other hemorrhoids    4.  Other constipation          DISPOSITION/PLAN   Discharge      PATIENT REFERRED TO:  Luis Moreira, LIS - CNP  [address] [id number] [phone]    In 2 days        DISCHARGE MEDICATIONS:  Discharge Medication List as of 4/13/2019 11:02 AM      START taking these medications    Details   polyethylene glycol (MIRALAX) powder Take 17 g by mouth daily for 7 days PRN constipation, Disp-1 Bottle, R-0Print      hydrocortisone 1 % cream Apply topically 2 -3 times daily for 5 - 7 days. , Disp-1 Tube, R-1, Print             (Please note thatportions of this note were completed with a voice recognition program.  Efforts were made to edit the dictations but occasionally words are mis-transcribed.)    The patient was given an opportunity to see the Emergency Attending. The patient voiced understanding that I was a Mid-Level Provider and was in agreement with being seen independently by myself. Scribe:  Marcus Manning 4/13/19 8:46 AM Scribing for and in the presence of Kerry Moreira PA-C. Signed by: Buck Clark,04/13/19 3:39 PM    Provider:  I personally performed the services described in the documentation, reviewed and edited the documentation which wasdictated to the scribe in my presence, and it accurately records my wordsand actions.     Kerry Moreira PA-C 4/13/19 3:39 PM             Thiago Colindresma  04/13/19 1539

## 2019-04-17 ENCOUNTER — HOSPITAL ENCOUNTER (OUTPATIENT)
Dept: MRI IMAGING | Age: 64
Discharge: HOME | End: 2019-04-17
Payer: COMMERCIAL

## 2019-04-17 DIAGNOSIS — K83.8 COMMON BILE DUCT DILATATION: ICD-10-CM

## 2019-04-17 DIAGNOSIS — Z87.19 HISTORY OF PANCREATITIS: ICD-10-CM

## 2019-04-17 DIAGNOSIS — K76.9 LIVER LESION: ICD-10-CM

## 2019-04-17 DIAGNOSIS — R93.5 ABNORMAL ABDOMINAL CT SCAN: ICD-10-CM

## 2019-04-17 DIAGNOSIS — R59.0 INTRA-ABDOMINAL LYMPHADENOPATHY: ICD-10-CM

## 2019-04-17 DIAGNOSIS — K86.9 LESION OF PANCREAS: ICD-10-CM

## 2019-04-17 DIAGNOSIS — K83.8 INTRAHEPATIC BILE DUCT DILATION: ICD-10-CM

## 2019-04-17 PROCEDURE — 74183 MRI ABD W/O CNTR FLWD CNTR: CPT

## 2019-04-17 PROCEDURE — A9579 GAD-BASE MR CONTRAST NOS,1ML: HCPCS | Performed by: NURSE PRACTITIONER

## 2019-04-17 PROCEDURE — 6360000004 HC RX CONTRAST MEDICATION: Performed by: NURSE PRACTITIONER

## 2019-04-17 RX ADMIN — GADOTERIDOL 20 ML: 279.3 INJECTION, SOLUTION INTRAVENOUS at 12:46

## 2019-10-12 ENCOUNTER — HOSPITAL ENCOUNTER (OUTPATIENT)
Age: 64
Setting detail: SPECIMEN
Discharge: HOME | End: 2019-10-12
Payer: COMMERCIAL

## 2019-10-15 LAB
-: ABNORMAL
AMORPHOUS: ABNORMAL
BACTERIA: ABNORMAL
BILIRUBIN URINE: NEGATIVE
CASTS UA: ABNORMAL /LPF (ref 0–8)
COLOR: YELLOW
COMMENT UA: ABNORMAL
CRYSTALS, UA: ABNORMAL /HPF
EPITHELIAL CELLS UA: ABNORMAL /HPF (ref 0–5)
GLUCOSE URINE: NEGATIVE
KETONES, URINE: NEGATIVE
LEUKOCYTE ESTERASE, URINE: NEGATIVE
MUCUS: ABNORMAL
NITRITE, URINE: POSITIVE
OTHER OBSERVATIONS UA: ABNORMAL
PH UA: 8 (ref 5–8)
PROTEIN UA: NEGATIVE
RBC UA: ABNORMAL /HPF (ref 0–4)
RENAL EPITHELIAL, UA: ABNORMAL /HPF
SPECIFIC GRAVITY UA: 1 (ref 1–1.03)
TRICHOMONAS: ABNORMAL
TURBIDITY: ABNORMAL
URINE HGB: NEGATIVE
UROBILINOGEN, URINE: NORMAL
WBC UA: ABNORMAL /HPF (ref 0–5)
YEAST: ABNORMAL

## 2019-10-17 LAB
CULTURE: ABNORMAL
Lab: ABNORMAL
SPECIMEN DESCRIPTION: ABNORMAL

## 2019-11-13 ENCOUNTER — APPOINTMENT (OUTPATIENT)
Dept: MRI IMAGING | Age: 64
End: 2019-11-13
Payer: COMMERCIAL

## 2019-11-25 ENCOUNTER — HOSPITAL ENCOUNTER (OUTPATIENT)
Dept: MRI IMAGING | Age: 64
Discharge: HOME | End: 2019-11-25
Payer: COMMERCIAL

## 2019-11-25 DIAGNOSIS — D18.03 LIVER HEMANGIOMA: ICD-10-CM

## 2019-11-25 LAB — POC CREATININE WHOLE BLOOD: 0.8 MG/DL (ref 0.5–1.2)

## 2019-11-25 PROCEDURE — 82565 ASSAY OF CREATININE: CPT

## 2019-11-25 PROCEDURE — 74183 MRI ABD W/O CNTR FLWD CNTR: CPT

## 2019-11-25 PROCEDURE — 6360000004 HC RX CONTRAST MEDICATION: Performed by: NURSE PRACTITIONER

## 2019-11-25 PROCEDURE — A9579 GAD-BASE MR CONTRAST NOS,1ML: HCPCS | Performed by: NURSE PRACTITIONER

## 2019-11-25 RX ADMIN — GADOTERIDOL 20 ML: 279.3 INJECTION, SOLUTION INTRAVENOUS at 16:34

## 2019-12-12 ENCOUNTER — OFFICE VISIT (OUTPATIENT)
Dept: UROLOGY | Age: 64
End: 2019-12-12
Payer: COMMERCIAL

## 2019-12-12 VITALS
SYSTOLIC BLOOD PRESSURE: 134 MMHG | WEIGHT: 226 LBS | DIASTOLIC BLOOD PRESSURE: 84 MMHG | HEIGHT: 69 IN | BODY MASS INDEX: 33.47 KG/M2

## 2019-12-12 DIAGNOSIS — N28.1 RENAL CYST: Primary | ICD-10-CM

## 2019-12-12 DIAGNOSIS — R31.29 MICROSCOPIC HEMATURIA: ICD-10-CM

## 2019-12-12 LAB
AMORPHOUS: ABNORMAL
BACTERIA: ABNORMAL /HPF
BILIRUBIN URINE: NEGATIVE
BILIRUBIN URINE: NEGATIVE
BLOOD URINE, POC: ABNORMAL
BLOOD, URINE: NEGATIVE
CASTS 2: ABNORMAL /LPF
CASTS UA: ABNORMAL /LPF
CHARACTER, URINE: CLEAR
CHARACTER, URINE: CLEAR
COLOR, URINE: YELLOW
COLOR: YELLOW
CRYSTALS, UA: ABNORMAL
CRYSTALS, UA: ABNORMAL
EPITHELIAL CELLS, UA: ABNORMAL /HPF
GLUCOSE URINE: NEGATIVE MG/DL
GLUCOSE URINE: NEGATIVE MG/DL
KETONES, URINE: NEGATIVE
KETONES, URINE: NEGATIVE
LEUKOCYTE CLUMPS, URINE: ABNORMAL
LEUKOCYTE ESTERASE, URINE: ABNORMAL
MISCELLANEOUS 2: ABNORMAL
MISCELLANEOUS LAB TEST RESULT: ABNORMAL
MUCUS: ABNORMAL
NITRITE, URINE: NEGATIVE
NITRITE, URINE: NEGATIVE
PH UA: 6 (ref 5–9)
PH, URINE: 6 (ref 5–9)
PROTEIN UA: NEGATIVE
PROTEIN, URINE: NEGATIVE MG/DL
RBC URINE: ABNORMAL /HPF
RENAL EPITHELIAL, UA: ABNORMAL
SPECIFIC GRAVITY, URINE: <= 1.005 (ref 1–1.03)
SPECIFIC GRAVITY, URINE: <= 1.005 (ref 1–1.03)
UROBILINOGEN, URINE: 0.2 EU/DL (ref 0–1)
UROBILINOGEN, URINE: 0.2 EU/DL (ref 0–1)
WBC UA: ABNORMAL /HPF
YEAST: ABNORMAL

## 2019-12-12 PROCEDURE — 81003 URINALYSIS AUTO W/O SCOPE: CPT | Performed by: PHYSICIAN ASSISTANT

## 2019-12-12 PROCEDURE — 99204 OFFICE O/P NEW MOD 45 MIN: CPT | Performed by: PHYSICIAN ASSISTANT

## 2019-12-15 ENCOUNTER — TELEPHONE (OUTPATIENT)
Dept: UROLOGY | Age: 64
End: 2019-12-15

## 2020-02-20 ENCOUNTER — OFFICE VISIT (OUTPATIENT)
Dept: ENT CLINIC | Age: 65
End: 2020-02-20
Payer: COMMERCIAL

## 2020-02-20 VITALS
RESPIRATION RATE: 24 BRPM | SYSTOLIC BLOOD PRESSURE: 102 MMHG | HEIGHT: 69 IN | DIASTOLIC BLOOD PRESSURE: 60 MMHG | TEMPERATURE: 98.1 F | WEIGHT: 233.1 LBS | HEART RATE: 68 BPM | BODY MASS INDEX: 34.52 KG/M2

## 2020-02-20 PROCEDURE — 99204 OFFICE O/P NEW MOD 45 MIN: CPT | Performed by: PHYSICIAN ASSISTANT

## 2020-02-20 RX ORDER — AMOXICILLIN AND CLAVULANATE POTASSIUM 875; 125 MG/1; MG/1
1 TABLET, FILM COATED ORAL 2 TIMES DAILY
Qty: 56 TABLET | Refills: 0 | Status: SHIPPED | OUTPATIENT
Start: 2020-02-20 | End: 2020-03-19

## 2020-02-20 RX ORDER — FLUTICASONE PROPIONATE 50 MCG
1 SPRAY, SUSPENSION (ML) NASAL DAILY
Qty: 1 BOTTLE | Refills: 2 | Status: SHIPPED | OUTPATIENT
Start: 2020-02-20 | End: 2021-07-19

## 2020-02-20 ASSESSMENT — ENCOUNTER SYMPTOMS
CHOKING: 0
VOICE CHANGE: 0
SHORTNESS OF BREATH: 0
NAUSEA: 0
SINUS PRESSURE: 0
APNEA: 0
ABDOMINAL PAIN: 0
WHEEZING: 0
VOMITING: 0
STRIDOR: 0
COUGH: 0
CHEST TIGHTNESS: 0
COLOR CHANGE: 0
DIARRHEA: 0
SORE THROAT: 0
FACIAL SWELLING: 1
TROUBLE SWALLOWING: 0
RHINORRHEA: 0

## 2020-02-20 NOTE — PROGRESS NOTES
[address] [facility]  [address] [place] [address]  Dept: [phone]  Dept Fax: [phone]  Loc: [address] [place] [address] Sharon Ratliff is a 59 y.o. female who was referred by Sudarshan Breen MD for:  Chief Complaint   Patient presents with    New Patient     New Patient, Chronic sinus issues, Dr. Georges Sagastume did a Sinus surgery 5 years ago, She feels like she has not been right since. She is having a lot of sinus pressure, headaches, 5 weeks ago she had dark green drainage. Since surgery she Cannot blow it out, she gets chunks of dried mucus. She tries to use the nasal rinse, when she is done with that she will drain. States most of the liquid stays in her nose until she bends over, then it drains out. She has a history of reactive Fuchs Pollen. Moreno Luz HPI:     The patient presents for evaluation of recurrent facial pressure. The patient reports she had sinus surgery with Dr. Georges Sagastume about 5 years ago. She reports that they fixed her septum as well as \"extra sinus cells. \"  The patient reports prior to surgery she had repeated infections, facial pressure, congestion, and post nasal drainage. She also reports symptoms of intermittent sneezing and clear rhinorrhea. The patient reports there was minimal improvement in the symptoms after surgery. She states that Dr. Georges Sagastume told her she \"has an allergic nose. \" The patient reports that she has tried various oral antihistamines, but the only one that seemed to help was Claritin D. She states that she may use the Claritin D once a week if she is feeling very congested. She denies use of any other decongestants. She has used menthol based products in the past, but hasn't been using any recently. The patient just finished an antibiotic about a month ago. She reports that it improved her congestion, facial pressure and drainage. However, over the following week or two the symptoms seemed to gradually return. [facility]) 1985? ??    cervical   cryo surgery    Cancer ([facility] [address]     skin-basal cell    Cataracts, bilateral     Constipation     Depression     Epilepsy ([facility] [address]     Fibromyalgia     Hearing loss     Hiatal hernia     History of Luis-Barr virus infection     Hoarseness     Hypothyroidism     Mono exposure     Night sweats     Osteoarthritis     Sinus infection     SOB (shortness of breath)     UTI (urinary tract infection) 05/30/2018       Social History:    TOBACCO:   reports that she quit smoking about 4 years ago. Her smoking use included cigarettes. She has a 14.50 pack-year smoking history. She has never used smokeless tobacco.  ETOH:   reports current alcohol use of about 2.0 standard drinks of alcohol per week. DRUGS:   reports current drug use. Drug: Marijuana. Family History:       Problem Relation Age of Onset    Heart Disease Maternal Grandmother 52        MI    Heart Disease Maternal Grandfather     Stroke Maternal Grandfather 46    Diabetes Mother     Heart Disease Mother     Other Mother         complications from accident   [facility] Cancer Father         bone prostate    Diabetes Father     High Blood Pressure Brother     Cancer Maternal Aunt         possible colon    Cancer Paternal Grandfather     Breast Cancer Neg Hx        Surgical History:  Past Surgical History:   Procedure Laterality Date    CARDIOVASCULAR STRESS TEST  03-14-11    No evidence of ischemia is noted.  CHOLECYSTECTOMY  2013    COLONOSCOPY      last one 2014    COLONOSCOPY  11/2016    DOPPLER ECHOCARDIOGRAPHY  03-18-11    EF 55-65%    EKG 12 LEAD (HISTORICAL)  04/2017    FRACTURE SURGERY  2000??    right shoulder    HYSTERECTOMY      2001??    OTHER SURGICAL HISTORY  29 OCT 2014    Cystoscopy (Dr. Natalia Cevallos, [facility])   [facility] SINUS SURGERY  06/2015    TONSILLECTOMY      as a teenager        Objective: This is a 59 y.o. female. Patient is alert and oriented to person, place and time. Patient appears well developed, well nourished. Mood is happy with normal affect. Not obviously hearing impaired. No abnormality in speech noted. /60 (Site: Left Upper Arm)   Pulse 68   Temp 98.1 °F (36.7 °C) (Oral)   Resp 24   Ht 5' 9\" (1.753 m)   Wt 233 lb 1.6 oz (105.7 kg)   BMI 34.42 kg/m²     Head:   Normocephalic, atraumatic. No obvious masses or lesions noted. Ears:  External ears: Normal: no scars, lesions or masses. Mastoid process: No erythema noted. No tenderness to palpation. R External auditory canal: clear and free of any pathology  L External auditory canal: clear and free of any pathology   Tympanic membranes:  R intact, translucent                                                  L intact, translucent  Nose:    External nose: Appears midline. No obvious deformity or masses. Septum:  normal. No septal hematoma. No perforation. Mucosa:  clear  Turbinates: There appears to be minimal inferior turbinates ? ?possible turbinoplasty with Vanan. Middle turbinates appear pale. Discharge:  none    Mouth/Throat:  Lips, tongue and oral cavity: Normal. No masses or lesions noted   Dentition: fair, no malocclusion  Oral mucosa: moist  Tonsils: absent  Oropharynx: normal-appearing mucosa  Hard and soft palates: symmetrical and intact. Salivary glands: not enlarged and no tenderness to palpation. Uvula: midline, no obvious lesions   Gag reflex is present. Neck: Trachea midline. Thyroid not enlarged, no palpable masses or tenderness. Lymphatic: No cervical lymphadenopathy noted. Eyes: GABI, EOM intact. Conjunctiva moist without discharge. Lungs: Normal effort of breathing, not obviously distressed. Neuro: Cranial nerves II-XII grossly intact. Extremities: No clubbing, edema, or cyanosis noted. Data:    Radiology Review:  CT Head WO Contrast 4/5/18              FINDINGS:       Lateral, third, fourth ventricles: Normal in size, contour, position. ..        Parenchyma:  No acute infarction, mass lesion, or intracranial hemorrhage is seen.        Mastoid processes: Well aerated. Normal in appearance. .          Paranasal sinuses/calvarium: There is evidence for prior surgery in both maxillary sinuses as well as the ethmoid sinuses. Moderate mucosal thickening is seen in the ethmoid sinuses bilaterally. The sinuses are otherwise unremarkable. No air-fluid    levels are seen.               Impression   No acute intracranial process. Chronic sinus disease. See comments above.         Audiogram 1/17/18  AUDIOGRAM    The patient had a negative MRI Brain W WO Contrast 2/27/14. The patient had been wearing hearing aids in that ear for several years at that time already. Assessment/Plan:     Diagnosis Orders   1. Chronic sinusitis, unspecified location  amoxicillin-clavulanate (AUGMENTIN) 875-125 MG per tablet    CT Facial Bones WO Contrast    fluticasone (FLONASE) 50 MCG/ACT nasal spray   2. Allergic rhinitis, unspecified seasonality, unspecified trigger  fluticasone (FLONASE) 50 MCG/ACT nasal spray   3. Sensorineural hearing loss (SNHL) of both ears  Audiometry with tympanometry   4. Wears hearing aid in both ears  Audiometry with tympanometry       The patient is a 59 y.o. female that presents for evaluation of chronic sinusitis. I believe that the patient may have an element of sinusitis and allergic rhinitis, especially with a CT of the head showing mild sinus disease just 2 years ago. I recommended starting the sinus work up. This will include 28 days of Augmentin, BID nasal saline irrigations, and Flonase. I recommended the patient take her oral antihistamine daily while treating as well. After the full treatment, the patient will get a CT of the facial bones to assess for persistent disease. She will follow up with Dr. Osmar Perez for those results and to discuss the plan going forward. If there is minimal sinus disease, the patient may benefit from an allergy work up.   The patient will also get an updated audiogram to assess for changes in her hearing. She is agreeable with the plan and thanked me. She will contact the office sooner with new or worsening symptoms.      Electronically signed by NEIDA Jara on 2/20/2020 at 4:21 PM

## 2020-03-31 ENCOUNTER — TELEPHONE (OUTPATIENT)
Dept: UROLOGY | Age: 65
End: 2020-03-31

## 2020-03-31 NOTE — TELEPHONE ENCOUNTER
Central Scheduling [phone] option 1 would like to know if the MRI can be rescheduled after May 26. Please advise. Thank you.

## 2020-03-31 NOTE — TELEPHONE ENCOUNTER
Spoke to Lindsey Washington. Advising her that Conchis Reyes has approved the delaying the MRI abdomen w/wo contrast until May 2020. Due to the Covid-19 pandemic. Rescheduled to 5/26/2020 arrival 1200. NPO 4 hours prior. Appointment reminders mailed to sam Sterling's appointment rescheduled. E-mail sent to Apurva at [employer]. Regarding the rescheduling.

## 2020-05-22 ENCOUNTER — TELEPHONE (OUTPATIENT)
Dept: UROLOGY | Age: 65
End: 2020-05-22

## 2020-05-22 NOTE — TELEPHONE ENCOUNTER
Patient left a message stating she needs a creatinine ordered for the MRI scheduled on 06/01/2020. I placed the order.  Thank you

## 2020-06-04 ENCOUNTER — HOSPITAL ENCOUNTER (OUTPATIENT)
Dept: MRI IMAGING | Age: 65
Discharge: HOME | End: 2020-06-04
Payer: COMMERCIAL

## 2020-06-04 LAB — POC CREATININE WHOLE BLOOD: 0.7 MG/DL (ref 0.5–1.2)

## 2020-06-04 PROCEDURE — 74183 MRI ABD W/O CNTR FLWD CNTR: CPT

## 2020-06-04 PROCEDURE — A9579 GAD-BASE MR CONTRAST NOS,1ML: HCPCS | Performed by: PHYSICIAN ASSISTANT

## 2020-06-04 PROCEDURE — 6360000004 HC RX CONTRAST MEDICATION: Performed by: PHYSICIAN ASSISTANT

## 2020-06-04 PROCEDURE — 82565 ASSAY OF CREATININE: CPT

## 2020-06-04 RX ADMIN — GADOTERIDOL 20 ML: 279.3 INJECTION, SOLUTION INTRAVENOUS at 09:37

## 2020-06-14 NOTE — PROGRESS NOTES
visit. No Known Allergies    Family History   Problem Relation Age of Onset    Heart Disease Maternal Grandmother 52        MI    Heart Disease Maternal Grandfather     Stroke Maternal Grandfather 46    Diabetes Mother     Heart Disease Mother     Other Mother         complications from accident    Cancer Father         bone prostate    Diabetes Father     High Blood Pressure Brother     Cancer Maternal Aunt         possible colon    Cancer Paternal Grandfather     Breast Cancer Neg Hx        Social History     Socioeconomic History    Marital status:      Spouse name: Not on file    Number of children: Not on file    Years of education: Not on file    Highest education level: Not on file   Occupational History    Not on file   Social Needs    Financial resource strain: Not on file    Food insecurity     Worry: Not on file     Inability: Not on file    Transportation needs     Medical: Not on file     Non-medical: Not on file   Tobacco Use    Smoking status: Former Smoker     Packs/day: 0.50     Years: 29.00     Pack years: 14.50     Types: Cigarettes     Last attempt to quit: 2015     Years since quittin.0    Smokeless tobacco: Never Used    Tobacco comment: smoked for 13 years and quit and just start again about 2 years ago   Substance and Sexual Activity    Alcohol use:  Yes     Alcohol/week: 2.0 standard drinks     Types: 2 Glasses of wine per week     Comment: occasional    Drug use: Yes     Types: Marijuana     Comment: medical marijuana prn    Sexual activity: Never   Lifestyle    Physical activity     Days per week: Not on file     Minutes per session: Not on file    Stress: Not on file   Relationships    Social connections     Talks on phone: Not on file     Gets together: Not on file     Attends [employer] service: Not on file     Active member of club or organization: Not on file     Attends meetings of clubs or organizations: Not on file     Relationship homogeneous enhancement on delayed images represent a hemangioma. No significant change from    previous.       There is no intrahepatic biliary dilatation. There is normal enhancement of the portal and the hepatic veins. There is normal enhancement of the portal venous confluence, SMV, and splenic vein.       GALLBLADDER:    The gallbladder is surgically absent.       BILIARY TREE:   The common duct is normal size measuring 10 mm    There is no choledocholithiasis.       PANCREAS:    The pancreas is normal size and signal characteristics. There is normal pancreatic enhancement. There is no pancreatic mass. The pancreatic duct is normal size measuring 2 mm   There is no pancreatic divisum. There is no peripancreatic fluid or inflammation.       SPLEEN:  No mass or enlargement. Tiny accessory splenule.       ADRENAL GLANDS: No mass or enlargement.       KIDNEYS: Within the left kidney there is an 18 mm high T2 signal focus with layering low T2 signal. Hemorrhagic type II cyst is favored. Mild capsular enhancement. No significant change from previous.        4 mm type one cyst right kidney.       BOWEL: Nonobstructive.       FREE AIR/FREE FLUID/INFLAMMATION: None.       LYMPHADENOPATHY:   There are no pathologically enlarged lymph nodes.        ABDOMINAL AORTA/IVC: Unremarkable.       LUNG BASES: Unremarkable.        MUSCULOSKELETAL: Unremarkable.       OTHER: None.               Impression       Stable lesions within the kidneys and the liver. No worrisome mass is evident. Plan:    1. Left renal mass- Stable 1.8 cm Bosniak 2 cyst located posteriorly off the mid-pole left kidney. Upon review of past imaging, this mass has been stable in size over the last 24 months. Will continue to follow with MRI of the abdomen with and without contrast in one year.      2. Liver Hemangioma- Stable. Following with GI.      3. Recurrent UTI- On D-Mannose daily. Will send urine reflex to culture.  Consider Hiprex if infections increase in frequency. Dhiraj Vasquez is a 59 y.o. female evaluated via telephone on 6/15/2020. Consent:  She and/or health care decision maker is aware that that she may receive a bill for this telephone service, depending on her insurance coverage, and has provided verbal consent to proceed: YES    Documentation:  I communicated with the patient and/or health care decision maker about: See HPI  Details of this discussion including any medical advice provided: See HPI      I AFFIRM this is a Patient Initiated Episode with a Patient who has not had a related appointment within my department in the past 7 days or scheduled within the next 24 hours.     Patient identification was verified at the start of the visit: YES    Total Time: 20 minutes    Note: not billable if this call serves to triage the patient into an appointment for the relevant concern      Curtis Aguayo

## 2020-06-15 ENCOUNTER — TELEPHONE (OUTPATIENT)
Dept: UROLOGY | Age: 65
End: 2020-06-15

## 2020-06-15 ENCOUNTER — VIRTUAL VISIT (OUTPATIENT)
Dept: UROLOGY | Age: 65
End: 2020-06-15
Payer: COMMERCIAL

## 2020-06-15 PROCEDURE — 99442 PR PHYS/QHP TELEPHONE EVALUATION 11-20 MIN: CPT | Performed by: PHYSICIAN ASSISTANT

## 2020-06-15 ASSESSMENT — ENCOUNTER SYMPTOMS
EYE PAIN: 0
COLOR CHANGE: 0
FACIAL SWELLING: 0
SHORTNESS OF BREATH: 0
ABDOMINAL PAIN: 0
NAUSEA: 0
CHEST TIGHTNESS: 0
EYE REDNESS: 0
BACK PAIN: 1

## 2020-06-16 NOTE — TELEPHONE ENCOUNTER
Please check patient's next appointment. One year follow-up schedule on 6/15/20, however next appointment is listed for 6/18/20. Please schedule MRI of abdomen prior to her one year appointment.

## 2020-06-23 ENCOUNTER — HOSPITAL ENCOUNTER (EMERGENCY)
Age: 65
Discharge: HOME | End: 2020-06-23
Attending: FAMILY MEDICINE
Payer: COMMERCIAL

## 2020-06-23 ENCOUNTER — APPOINTMENT (OUTPATIENT)
Dept: CT IMAGING | Age: 65
End: 2020-06-23
Payer: COMMERCIAL

## 2020-06-23 VITALS
RESPIRATION RATE: 20 BRPM | DIASTOLIC BLOOD PRESSURE: 69 MMHG | SYSTOLIC BLOOD PRESSURE: 130 MMHG | BODY MASS INDEX: 37.03 KG/M2 | HEART RATE: 75 BPM | HEIGHT: 69 IN | WEIGHT: 250 LBS | TEMPERATURE: 98 F | OXYGEN SATURATION: 99 %

## 2020-06-23 LAB
ALBUMIN SERPL-MCNC: 4.1 G/DL (ref 3.5–5.1)
ALP BLD-CCNC: 95 U/L (ref 38–126)
ALT SERPL-CCNC: 16 U/L (ref 11–66)
AMPHETAMINE+METHAMPHETAMINE URINE SCREEN: NEGATIVE
ANION GAP SERPL CALCULATED.3IONS-SCNC: 13 MEQ/L (ref 8–16)
AST SERPL-CCNC: 22 U/L (ref 5–40)
BARBITURATE QUANTITATIVE URINE: NEGATIVE
BASOPHILS # BLD: 1.1 %
BASOPHILS ABSOLUTE: 0.1 THOU/MM3 (ref 0–0.1)
BENZODIAZEPINE QUANTITATIVE URINE: NEGATIVE
BILIRUB SERPL-MCNC: 0.5 MG/DL (ref 0.3–1.2)
BILIRUBIN DIRECT: < 0.2 MG/DL (ref 0–0.3)
BUN BLDV-MCNC: 20 MG/DL (ref 7–22)
CALCIUM SERPL-MCNC: 9.5 MG/DL (ref 8.5–10.5)
CANNABINOID QUANTITATIVE URINE: NEGATIVE
CHLORIDE BLD-SCNC: 104 MEQ/L (ref 98–111)
CO2: 23 MEQ/L (ref 23–33)
COCAINE METABOLITE QUANTITATIVE URINE: NEGATIVE
CREAT SERPL-MCNC: 0.7 MG/DL (ref 0.4–1.2)
EKG ATRIAL RATE: 78 BPM
EKG P AXIS: -11 DEGREES
EKG P-R INTERVAL: 184 MS
EKG Q-T INTERVAL: 378 MS
EKG QRS DURATION: 98 MS
EKG QTC CALCULATION (BAZETT): 430 MS
EKG R AXIS: 100 DEGREES
EKG T AXIS: 28 DEGREES
EKG VENTRICULAR RATE: 78 BPM
EOSINOPHIL # BLD: 3.8 %
EOSINOPHILS ABSOLUTE: 0.2 THOU/MM3 (ref 0–0.4)
ERYTHROCYTE [DISTWIDTH] IN BLOOD BY AUTOMATED COUNT: 13.2 % (ref 11.5–14.5)
ERYTHROCYTE [DISTWIDTH] IN BLOOD BY AUTOMATED COUNT: 45.2 FL (ref 35–45)
ETHYL ALCOHOL, SERUM: < 0.01 %
GFR SERPL CREATININE-BSD FRML MDRD: 84 ML/MIN/1.73M2
GLUCOSE BLD-MCNC: 100 MG/DL (ref 70–108)
GLUCOSE BLD-MCNC: 94 MG/DL (ref 70–108)
HCT VFR BLD CALC: 44.4 % (ref 37–47)
HEMOGLOBIN: 14.4 GM/DL (ref 12–16)
IMMATURE GRANS (ABS): 0.02 THOU/MM3 (ref 0–0.07)
IMMATURE GRANULOCYTES: 0.4 %
LYMPHOCYTES # BLD: 27.8 %
LYMPHOCYTES ABSOLUTE: 1.5 THOU/MM3 (ref 1–4.8)
MCH RBC QN AUTO: 30.3 PG (ref 26–33)
MCHC RBC AUTO-ENTMCNC: 32.4 GM/DL (ref 32.2–35.5)
MCV RBC AUTO: 93.5 FL (ref 81–99)
MONOCYTES # BLD: 8.1 %
MONOCYTES ABSOLUTE: 0.4 THOU/MM3 (ref 0.4–1.3)
NUCLEATED RED BLOOD CELLS: 0 /100 WBC
OPIATES, URINE: NEGATIVE
OSMOLALITY CALCULATION: 282.1 MOSMOL/KG (ref 275–300)
OXYCODONE: NEGATIVE
PHENCYCLIDINE QUANTITATIVE URINE: NEGATIVE
PLATELET # BLD: 236 THOU/MM3 (ref 130–400)
PMV BLD AUTO: 9.8 FL (ref 9.4–12.4)
POTASSIUM SERPL-SCNC: 4.2 MEQ/L (ref 3.5–5.2)
RBC # BLD: 4.75 MILL/MM3 (ref 4.2–5.4)
SEG NEUTROPHILS: 58.8 %
SEGMENTED NEUTROPHILS ABSOLUTE COUNT: 3.1 THOU/MM3 (ref 1.8–7.7)
SODIUM BLD-SCNC: 140 MEQ/L (ref 135–145)
TOTAL PROTEIN: 7 G/DL (ref 6.1–8)
TSH SERPL DL<=0.05 MIU/L-ACNC: 2.26 UIU/ML (ref 0.4–4.2)
WBC # BLD: 5.3 THOU/MM3 (ref 4.8–10.8)

## 2020-06-23 PROCEDURE — 70450 CT HEAD/BRAIN W/O DYE: CPT

## 2020-06-23 PROCEDURE — 85025 COMPLETE CBC W/AUTO DIFF WBC: CPT

## 2020-06-23 PROCEDURE — 99283 EMERGENCY DEPT VISIT LOW MDM: CPT

## 2020-06-23 PROCEDURE — 82948 REAGENT STRIP/BLOOD GLUCOSE: CPT

## 2020-06-23 PROCEDURE — 93010 ELECTROCARDIOGRAM REPORT: CPT | Performed by: INTERNAL MEDICINE

## 2020-06-23 PROCEDURE — 80305 DRUG TEST PRSMV DIR OPT OBS: CPT

## 2020-06-23 PROCEDURE — 84443 ASSAY THYROID STIM HORMONE: CPT

## 2020-06-23 PROCEDURE — 80053 COMPREHEN METABOLIC PANEL: CPT

## 2020-06-23 PROCEDURE — 36415 COLL VENOUS BLD VENIPUNCTURE: CPT

## 2020-06-23 PROCEDURE — 82248 BILIRUBIN DIRECT: CPT

## 2020-06-23 PROCEDURE — G0480 DRUG TEST DEF 1-7 CLASSES: HCPCS

## 2020-06-23 PROCEDURE — 93005 ELECTROCARDIOGRAM TRACING: CPT | Performed by: FAMILY MEDICINE

## 2020-06-23 ASSESSMENT — ENCOUNTER SYMPTOMS
SHORTNESS OF BREATH: 0
VOMITING: 0
ABDOMINAL PAIN: 0
NAUSEA: 0

## 2020-06-23 NOTE — ED PROVIDER NOTES
[facility]  eMERGENCY dEPARTMENT eNCOUnter          CHIEF COMPLAINT     No chief complaint on file. Nurses Notes reviewed and I agree except as noted in the HPI. HISTORY OF PRESENT ILLNESS    Justin Fuller is a 59 y.o. female who presents to the Emergency Department for the evaluation of memory loss. Patient describes sporadic amnesia while she was showering this morning, stating that she is able to recall some events but unable to remember others. Patient describes herself as feeling \"out of sorts. \" Patient states that she proceeded to go to work this morning and denies any difficulty driving. Patient was then brought to the department by a coworker for evaluation due to the amnesia that occurred this morning in addition to other symptoms. Patient describes a history of unsteady balance but states that current difficulty balancing is worse than baseline. Patient denies difficulty urinating and dysuria, while she endorses urination urgency and frequency. Patient describes a left-sided mild headache that she had this morning but states that this has since resolved itself. Patient reports a history of bilateral tinnitus with hearing aids but denies any new changes in her hearing. Patient also denies any changes in appetite, emesis, diarrhea, abdominal pain, chest pain, or changes in vision. Patient denies smoking. Patient states that she was evaluated for possible sleep apnea many years ago but told that she does not have this condition. Patient denies any history of diabetes mellitus. Patient notes that she suffered a few seizures when she was younger, noting the last seizure occurring when she was 24years of age. Patient denies any seizures since that incident and states that she has not taken seizure medications in years. Patient reports no other symptoms at the time of the initial encounter.      Location/Symptom: amnesia   Timing/Onset: this morning   Duration: intermittent     The HPI was provided by the patient. REVIEW OF SYSTEMS     Review of Systems   Constitutional: Negative for chills, diaphoresis and fever. HENT: Negative for congestion. Respiratory: Negative for shortness of breath. Cardiovascular: Negative for chest pain. Gastrointestinal: Negative for abdominal pain, nausea and vomiting. Genitourinary: Negative for dysuria. Musculoskeletal: Negative for joint swelling. Skin: Negative for rash. Neurological: Negative for speech difficulty, weakness, numbness and headaches. Hematological: Negative for adenopathy. Psychiatric/Behavioral: Positive for confusion. Negative for sleep disturbance. Some difficulty with memory today       PAST MEDICAL HISTORY    has a past medical history of Acid reflux, Arthritis, Cancer ([address] Cancer ([address] Cataracts, bilateral, Constipation, Depression, Epilepsy ([address] Fibromyalgia, Hearing loss, Hiatal hernia, History of Luis-Barr virus infection, Hoarseness, Hypothyroidism, Mono exposure, Night sweats, Osteoarthritis, Sinus infection, SOB (shortness of breath), and UTI (urinary tract infection). SURGICAL HISTORY    has a past surgical history that includes cardiovascular stress test (03-14-11); doppler echocardiography (03-18-11); Cholecystectomy (2013); Tonsillectomy; Colonoscopy; fracture surgery (2000?? ); Hysterectomy; shoulder surgery; other surgical history (29 OCT 2014); sinus surgery (06/2015); Colonoscopy (11/2016); and ekg 12 lead (historical) (04/2017).     CURRENT MEDICATIONS       Discharge Medication List as of 6/23/2020 11:42 AM      CONTINUE these medications which have NOT CHANGED    Details   Cyclobenzaprine HCl (FLEXERIL PO) Take by mouth as neededHistorical Med      fluticasone (FLONASE) 50 MCG/ACT nasal spray 1 spray by Each Nostril route daily, Disp-1 Bottle, R-2Normal      Ascorbic Acid (JOELLE-C PO) Take by mouthHistorical Med      TURMERIC PO Take by mouthHistorical Med      Zinc Sulfate (ZINC 15 PO) maternal aunt is unknown. She indicated that the status of her neg hx is unknown.   family history includes Cancer in her father, maternal aunt, and paternal grandfather; Diabetes in her father and mother; Heart Disease in her maternal grandfather and mother; Heart Disease (age of onset: 52) in her maternal grandmother; High Blood Pressure in her brother; Other in her mother; Stroke (age of onset: 46) in her maternal grandfather. SOCIAL HISTORY      reports that she quit smoking about 5 years ago. Her smoking use included cigarettes. She has a 14.50 pack-year smoking history. She has never used smokeless tobacco. She reports current alcohol use of about 2.0 standard drinks of alcohol per week. She reports current drug use. Drug: Marijuana. PHYSICAL EXAM     INITIAL VITALS:  height is 5' 9\" (1.753 m) and weight is 250 lb (113.4 kg). Her oral temperature is 98 °F (36.7 °C). Her blood pressure is 130/69 and her pulse is 75. Her respiration is 20 and oxygen saturation is 99%. Physical Exam  Vitals signs and nursing note reviewed. Constitutional:       Appearance: Normal appearance. Comments: GCS 15   HENT:      Head: Normocephalic and atraumatic. Comments: Ear canals clear, TMs normal    Nose is clear    Mouth and throat normal    Eyes:      General:         Right eye: No discharge. Left eye: No discharge. Extraocular Movements: Extraocular movements intact. Pupils: Pupils are equal, round, and reactive to light. Neck:      Musculoskeletal: Normal range of motion and neck supple. No neck rigidity or muscular tenderness. Cardiovascular:      Rate and Rhythm: Normal rate and regular rhythm. Pulses: Normal pulses. Heart sounds: Normal heart sounds. Pulmonary:      Effort: Pulmonary effort is normal.      Breath sounds: Normal breath sounds. Abdominal:      Palpations: Abdomen is soft. Tenderness: There is no abdominal tenderness.    Musculoskeletal: General: No swelling. Skin:     General: Skin is warm and dry. Findings: No rash. Neurological:      General: No focal deficit present. Mental Status: She is alert and oriented to person, place, and time. Motor: No weakness. Psychiatric:         Mood and Affect: Mood normal.         Behavior: Behavior normal.         DIFFERENTIAL DIAGNOSIS:     Some difficulty with memory today which has resolved    This may represent more global process    Evaluate for drug ingestion, anemias, electrolyte problems    Sitter CNS lesion such as mass    She does have hypothyroidism,  evaluate her thyroid replacement dosage    DIAGNOSTIC RESULTS     EKG: All EKG's are interpreted by the Emergency Department Physician who either signs or Co-signs this chart in the absence of a cardiologist.  EKG interpreted by Kellie Enriquez MD:    EKG showed sinus rhythm with rate 78. QRS complexes show 100 degrees axis, normal conduction. ST-T waves show no acute change        RADIOLOGY: non-plain film images(s) such as CT, Ultrasound and MRI are read by the radiologist.    CT HEAD WO CONTRAST   Final Result      No mass effect or acute hemorrhage. **This report has been created using voice recognition software. It may contain minor errors which are inherent in voice recognition technology. **      Final report electronically signed by Dr. Kristal Lang on 6/23/2020 9:59 AM           LABS:   Labs Reviewed   CBC WITH AUTO DIFFERENTIAL - Abnormal; Notable for the following components:       Result Value    RDW-SD 45.2 (*)     All other components within normal limits   GLOMERULAR FILTRATION RATE, ESTIMATED - Abnormal; Notable for the following components:    Est, Glom Filt Rate 84 (*)     All other components within normal limits   BASIC METABOLIC PANEL   HEPATIC FUNCTION PANEL   ETHANOL   TSH WITH REFLEX   RAPID DRUG SCREEN, URINE   ANION GAP   OSMOLALITY   POCT GLUCOSE       EMERGENCY DEPARTMENT COURSE:   Vitals: Vitals:    06/23/20 0906 06/23/20 0910 06/23/20 1007 06/23/20 1108   BP:  (!) 144/79 121/66 130/69   Pulse:  91 73 75   Resp:  20     Temp:  98 °F (36.7 °C)     TempSrc:  Oral     SpO2:  99%     Weight: 250 lb (113.4 kg)      Height: 5' 9\" (1.753 m)          9:19 AM EDT: The patient was seen and evaluated. Nursing notes reviewed    CT scan of brain was normal with no sign of structural brain lesion    TSH normal suggesting adequate thyroid replacement    Urine drug screen negative    EtOH negative    CBC normal    Renal function and electrolytes and blood sugar normal    No specific explanation for episode which may represent some degree of cognitive dysfunction due to memory changes    No new medications        CRITICAL CARE:   none     CONSULTS:  none    PROCEDURES:  none     FINAL IMPRESSION      1. Memory changes          DISPOSITION/PLAN     Follow-up primary care next week    PATIENT REFERRED TO:  Arlin Lindsay [address]  [phone]    In 1 week        DISCHARGE MEDICATIONS:  Discharge Medication List as of 6/23/2020 11:42 AM          (Please note that portions of this note were completed with a voice recognition program.  Efforts were made to edit the dictations but occasionally words are mis-transcribed.)    Scribe:  Leeanna Silverman 6/23/20 9:19 AM EDT Scribing for and in the presence of Kendall Gabriel MD.    Signed by: Buck Wall, 06/24/20 4:49 PM    Provider:  I personally performed the services described in the documentation, reviewed and edited the documentation which was dictated to the scribe in my presence, and it accurately records my words and actions.     Kendall Gabriel MD 6/23/20 4:49 PM        Kendall Gabriel MD  06/23/20 [id number] [place]  06/24/20 0425

## 2020-06-23 NOTE — ED NOTES
Patient able to ambulate without difficulty to BR 1 assist for urine specimen      Fely Benitez RN  06/23/20 0951

## 2020-06-23 NOTE — ED NOTES
Patient to ED for a short period of memory loss. Patient states she woke up around 0600. While in the shower around 0700 patient got out of shower however doesn't remember if she washed her hair or remembers drying off. Patient had a mild headache however continued on with her day driving to work. While at work patient told co works about the symptoms she was having and they drove her to the hospital.  On arrival to ER patient states her headache is 2/10, denies any slurred speech. Patient has full PMS in all four extremities and is able to ambulate to bathroom without difficulties. Patient is alert and oriented.       Dominique Silvestre RN  06/23/20 [id number]

## 2020-06-24 ENCOUNTER — CARE COORDINATION (OUTPATIENT)
Dept: CARE COORDINATION | Age: 65
End: 2020-06-24

## 2020-07-23 ENCOUNTER — TELEPHONE (OUTPATIENT)
Dept: ENT CLINIC | Age: 65
End: 2020-07-23

## 2020-07-23 NOTE — TELEPHONE ENCOUNTER
Called patient to see if she wanted to reschedule her office visit and audiogram.  No answer so I left message on voicemail to call back.

## 2020-08-10 ENCOUNTER — HOSPITAL ENCOUNTER (OUTPATIENT)
Age: 65
Setting detail: SPECIMEN
Discharge: HOME | End: 2020-08-10

## 2020-08-10 LAB — TSH SERPL DL<=0.05 MIU/L-ACNC: 2.63 MIU/L (ref 0.3–5)

## 2020-08-28 ENCOUNTER — TELEPHONE (OUTPATIENT)
Dept: ENT CLINIC | Age: 65
End: 2020-08-28

## 2020-08-28 NOTE — TELEPHONE ENCOUNTER
Called patient to see if she would like to reschedule her hearing test and office visit that we had to cancel because of the COVID-19.   Left message with our number for her to call back and sent a letter

## 2020-12-08 ENCOUNTER — HOSPITAL ENCOUNTER (OUTPATIENT)
Age: 65
Discharge: HOME | End: 2020-12-08
Payer: MEDICARE

## 2020-12-08 LAB
ANION GAP SERPL CALCULATED.3IONS-SCNC: 12 MEQ/L (ref 8–16)
BUN BLDV-MCNC: 11 MG/DL (ref 7–22)
CALCIUM SERPL-MCNC: 9.6 MG/DL (ref 8.5–10.5)
CHLORIDE BLD-SCNC: 108 MEQ/L (ref 98–111)
CO2: 26 MEQ/L (ref 23–33)
CREAT SERPL-MCNC: 0.5 MG/DL (ref 0.4–1.2)
EKG ATRIAL RATE: 67 BPM
EKG P AXIS: 45 DEGREES
EKG P-R INTERVAL: 160 MS
EKG Q-T INTERVAL: 388 MS
EKG QRS DURATION: 92 MS
EKG QTC CALCULATION (BAZETT): 409 MS
EKG R AXIS: 21 DEGREES
EKG T AXIS: 9 DEGREES
EKG VENTRICULAR RATE: 67 BPM
ERYTHROCYTE [DISTWIDTH] IN BLOOD BY AUTOMATED COUNT: 13.9 % (ref 11.5–14.5)
ERYTHROCYTE [DISTWIDTH] IN BLOOD BY AUTOMATED COUNT: 49 FL (ref 35–45)
GFR SERPL CREATININE-BSD FRML MDRD: > 90 ML/MIN/1.73M2
GLUCOSE BLD-MCNC: 107 MG/DL (ref 70–108)
HCT VFR BLD CALC: 45.1 % (ref 37–47)
HEMOGLOBIN: 14.6 GM/DL (ref 12–16)
MCH RBC QN AUTO: 30.7 PG (ref 26–33)
MCHC RBC AUTO-ENTMCNC: 32.4 GM/DL (ref 32.2–35.5)
MCV RBC AUTO: 94.9 FL (ref 81–99)
PLATELET # BLD: 273 THOU/MM3 (ref 130–400)
PMV BLD AUTO: 10.3 FL (ref 9.4–12.4)
POTASSIUM SERPL-SCNC: 4.1 MEQ/L (ref 3.5–5.2)
RBC # BLD: 4.75 MILL/MM3 (ref 4.2–5.4)
SODIUM BLD-SCNC: 146 MEQ/L (ref 135–145)
WBC # BLD: 5.5 THOU/MM3 (ref 4.8–10.8)

## 2020-12-08 PROCEDURE — 93010 ELECTROCARDIOGRAM REPORT: CPT | Performed by: NUCLEAR MEDICINE

## 2020-12-08 PROCEDURE — 93005 ELECTROCARDIOGRAM TRACING: CPT | Performed by: ORTHOPAEDIC SURGERY

## 2020-12-08 PROCEDURE — 85027 COMPLETE CBC AUTOMATED: CPT

## 2020-12-08 PROCEDURE — 80048 BASIC METABOLIC PNL TOTAL CA: CPT

## 2020-12-08 PROCEDURE — 36415 COLL VENOUS BLD VENIPUNCTURE: CPT

## 2021-02-25 ENCOUNTER — HOSPITAL ENCOUNTER (OUTPATIENT)
Age: 66
Setting detail: SPECIMEN
Discharge: HOME | End: 2021-02-25
Payer: MEDICARE

## 2021-02-26 LAB
-: NORMAL
AMORPHOUS: NORMAL
BACTERIA: NORMAL
BILIRUBIN URINE: NEGATIVE
CASTS UA: NORMAL /LPF (ref 0–8)
COLOR: YELLOW
COMMENT UA: ABNORMAL
CRYSTALS, UA: NORMAL /HPF
EPITHELIAL CELLS UA: NORMAL /HPF (ref 0–5)
GLUCOSE URINE: NEGATIVE
KETONES, URINE: NEGATIVE
LEUKOCYTE ESTERASE, URINE: ABNORMAL
MUCUS: NORMAL
NITRITE, URINE: NEGATIVE
OTHER OBSERVATIONS UA: NORMAL
PH UA: 7.5 (ref 5–8)
PROTEIN UA: NEGATIVE
RBC UA: NORMAL /HPF (ref 0–4)
RENAL EPITHELIAL, UA: NORMAL /HPF
SPECIFIC GRAVITY UA: 1.01 (ref 1–1.03)
TRICHOMONAS: NORMAL
TURBIDITY: CLEAR
URINE HGB: NEGATIVE
UROBILINOGEN, URINE: NORMAL
WBC UA: NORMAL /HPF (ref 0–5)
YEAST: NORMAL

## 2021-03-09 ENCOUNTER — APPOINTMENT (OUTPATIENT)
Dept: GENERAL RADIOLOGY | Age: 66
End: 2021-03-09
Payer: MEDICARE

## 2021-03-09 ENCOUNTER — HOSPITAL ENCOUNTER (EMERGENCY)
Age: 66
Discharge: HOME | End: 2021-03-09
Attending: EMERGENCY MEDICINE
Payer: MEDICARE

## 2021-03-09 VITALS
WEIGHT: 256 LBS | RESPIRATION RATE: 18 BRPM | TEMPERATURE: 97.6 F | HEART RATE: 87 BPM | DIASTOLIC BLOOD PRESSURE: 64 MMHG | SYSTOLIC BLOOD PRESSURE: 130 MMHG | BODY MASS INDEX: 37.92 KG/M2 | OXYGEN SATURATION: 100 % | HEIGHT: 69 IN

## 2021-03-09 DIAGNOSIS — K59.04 CHRONIC IDIOPATHIC CONSTIPATION: Primary | ICD-10-CM

## 2021-03-09 LAB
ALBUMIN SERPL-MCNC: 4.1 G/DL (ref 3.5–5.1)
ALP BLD-CCNC: 99 U/L (ref 38–126)
ALT SERPL-CCNC: 18 U/L (ref 11–66)
ANION GAP SERPL CALCULATED.3IONS-SCNC: 10 MEQ/L (ref 8–16)
AST SERPL-CCNC: 20 U/L (ref 5–40)
BACTERIA: ABNORMAL
BASOPHILS # BLD: 0.8 %
BASOPHILS ABSOLUTE: 0 THOU/MM3 (ref 0–0.1)
BILIRUB SERPL-MCNC: 0.7 MG/DL (ref 0.3–1.2)
BILIRUBIN URINE: NEGATIVE
BLOOD, URINE: NEGATIVE
BUN BLDV-MCNC: 8 MG/DL (ref 7–22)
CALCIUM SERPL-MCNC: 9.7 MG/DL (ref 8.5–10.5)
CASTS: ABNORMAL /LPF
CASTS: ABNORMAL /LPF
CHARACTER, URINE: CLEAR
CHLORIDE BLD-SCNC: 101 MEQ/L (ref 98–111)
CO2: 26 MEQ/L (ref 23–33)
COLOR: YELLOW
CREAT SERPL-MCNC: 0.8 MG/DL (ref 0.4–1.2)
CRYSTALS: ABNORMAL
EKG ATRIAL RATE: 76 BPM
EKG P AXIS: 73 DEGREES
EKG P-R INTERVAL: 196 MS
EKG Q-T INTERVAL: 384 MS
EKG QRS DURATION: 100 MS
EKG QTC CALCULATION (BAZETT): 432 MS
EKG R AXIS: -34 DEGREES
EKG T AXIS: 36 DEGREES
EKG VENTRICULAR RATE: 76 BPM
EOSINOPHIL # BLD: 3.2 %
EOSINOPHILS ABSOLUTE: 0.2 THOU/MM3 (ref 0–0.4)
EPITHELIAL CELLS, UA: ABNORMAL /HPF
ERYTHROCYTE [DISTWIDTH] IN BLOOD BY AUTOMATED COUNT: 13.6 % (ref 11.5–14.5)
ERYTHROCYTE [DISTWIDTH] IN BLOOD BY AUTOMATED COUNT: 47.2 FL (ref 35–45)
GFR SERPL CREATININE-BSD FRML MDRD: 72 ML/MIN/1.73M2
GLUCOSE BLD-MCNC: 113 MG/DL (ref 70–108)
GLUCOSE, URINE: NEGATIVE MG/DL
HCT VFR BLD CALC: 43.4 % (ref 37–47)
HEMOGLOBIN: 13.8 GM/DL (ref 12–16)
IMMATURE GRANS (ABS): 0.01 THOU/MM3 (ref 0–0.07)
IMMATURE GRANULOCYTES: 0.2 %
KETONES, URINE: NEGATIVE
LACTIC ACID: 1.6 MMOL/L (ref 0.5–2.2)
LEUKOCYTE ESTERASE, URINE: ABNORMAL
LYMPHOCYTES # BLD: 27.3 %
LYMPHOCYTES ABSOLUTE: 1.6 THOU/MM3 (ref 1–4.8)
MCH RBC QN AUTO: 30 PG (ref 26–33)
MCHC RBC AUTO-ENTMCNC: 31.8 GM/DL (ref 32.2–35.5)
MCV RBC AUTO: 94.3 FL (ref 81–99)
MISCELLANEOUS LAB TEST RESULT: ABNORMAL
MONOCYTES # BLD: 9.2 %
MONOCYTES ABSOLUTE: 0.6 THOU/MM3 (ref 0.4–1.3)
NITRITE, URINE: NEGATIVE
NUCLEATED RED BLOOD CELLS: 0 /100 WBC
OSMOLALITY CALCULATION: 273 MOSMOL/KG (ref 275–300)
PH UA: 7 (ref 5–9)
PLATELET # BLD: 258 THOU/MM3 (ref 130–400)
PMV BLD AUTO: 10.4 FL (ref 9.4–12.4)
POTASSIUM REFLEX MAGNESIUM: 3.9 MEQ/L (ref 3.5–5.2)
PROTEIN UA: NEGATIVE MG/DL
RBC # BLD: 4.6 MILL/MM3 (ref 4.2–5.4)
RBC URINE: ABNORMAL /HPF
RENAL EPITHELIAL, UA: ABNORMAL
SEG NEUTROPHILS: 59.3 %
SEGMENTED NEUTROPHILS ABSOLUTE COUNT: 3.6 THOU/MM3 (ref 1.8–7.7)
SODIUM BLD-SCNC: 137 MEQ/L (ref 135–145)
SPECIFIC GRAVITY UA: < 1.005 (ref 1–1.03)
TOTAL PROTEIN: 7.2 G/DL (ref 6.1–8)
TROPONIN T: < 0.01 NG/ML
TSH SERPL DL<=0.05 MIU/L-ACNC: 2.12 UIU/ML (ref 0.4–4.2)
UROBILINOGEN, URINE: 0.2 EU/DL (ref 0–1)
WBC # BLD: 6 THOU/MM3 (ref 4.8–10.8)
WBC UA: ABNORMAL /HPF
YEAST: ABNORMAL

## 2021-03-09 PROCEDURE — 80053 COMPREHEN METABOLIC PANEL: CPT

## 2021-03-09 PROCEDURE — 74019 RADEX ABDOMEN 2 VIEWS: CPT

## 2021-03-09 PROCEDURE — 85025 COMPLETE CBC W/AUTO DIFF WBC: CPT

## 2021-03-09 PROCEDURE — 81001 URINALYSIS AUTO W/SCOPE: CPT

## 2021-03-09 PROCEDURE — 83605 ASSAY OF LACTIC ACID: CPT

## 2021-03-09 PROCEDURE — 99284 EMERGENCY DEPT VISIT MOD MDM: CPT

## 2021-03-09 PROCEDURE — 84443 ASSAY THYROID STIM HORMONE: CPT

## 2021-03-09 PROCEDURE — 36415 COLL VENOUS BLD VENIPUNCTURE: CPT

## 2021-03-09 PROCEDURE — 93005 ELECTROCARDIOGRAM TRACING: CPT | Performed by: STUDENT IN AN ORGANIZED HEALTH CARE EDUCATION/TRAINING PROGRAM

## 2021-03-09 PROCEDURE — 84484 ASSAY OF TROPONIN QUANT: CPT

## 2021-03-09 RX ORDER — POLYETHYLENE GLYCOL 3350 17 G/17G
17 POWDER, FOR SOLUTION ORAL DAILY
Qty: 510 G | Refills: 0 | Status: SHIPPED | OUTPATIENT
Start: 2021-03-09 | End: 2021-04-08

## 2021-03-09 RX ORDER — DOCUSATE SODIUM 100 MG/1
100 CAPSULE, LIQUID FILLED ORAL 2 TIMES DAILY
Qty: 60 CAPSULE | Refills: 0 | Status: SHIPPED | OUTPATIENT
Start: 2021-03-09 | End: 2022-02-23

## 2021-03-09 ASSESSMENT — PAIN DESCRIPTION - ORIENTATION: ORIENTATION: RIGHT;LOWER;UPPER

## 2021-03-09 ASSESSMENT — ENCOUNTER SYMPTOMS
SHORTNESS OF BREATH: 1
COUGH: 0

## 2021-03-09 ASSESSMENT — PAIN DESCRIPTION - FREQUENCY: FREQUENCY: CONTINUOUS

## 2021-03-09 ASSESSMENT — PAIN DESCRIPTION - PAIN TYPE: TYPE: ACUTE PAIN

## 2021-03-09 NOTE — ED PROVIDER NOTES
[facility] ENCOUNTER          Pt Name: Luis Antonio Alcantara  MRN: [id number]  [place] 1955  Date of evaluation: 3/9/2021  Treating Resident Physician: Ben Norman MD  Supervising Physician: Dr. José Luis Gustafson       Chief Complaint   Patient presents with    Abdominal Pain     History obtained from the patient. HISTORY OF PRESENT ILLNESS    HPI  Luis Antonio Alcantara is a 72 y.o. female who presents to the emergency department for evaluation of right lower quadrant pain and fever. The patient has no other acute complaints at this time. \"Feeling kind of funky. \"    When asked what she meant by this, patient stated she had right lower quadrant pain that started several months ago but is gotten worse over the past couple days. Rates pain as a 3 out of 10 and describes it as a cramp in the right lower quadrant. States increased \"pressure\" after eating. States she had COVID-19 in January 26, history of pancreatitis, and sphincter of Oddi spasm. Describes intermittent nausea. Affirms longstanding history of constipation. From sitting up to 7 to 10 days without a bowel movement, but states that this is improved since taking potassium and magnesium. Patient states her last bowel movement was 2 days ago and that it felt \"incomplete. \"    States she was previously treated for UTI but did not complete the Cipro because she felt like it \"messed everything up. \"    REVIEW OF SYSTEMS   Review of Systems   Constitutional: Positive for fever (Subjective. ). Negative for chills. Respiratory: Positive for shortness of breath (Baseline. ). Negative for cough. Cardiovascular: Negative for chest pain and palpitations. Genitourinary: Positive for difficulty urinating. Negative for dysuria. All other systems reviewed and are negative.         PAST MEDICAL AND SURGICAL HISTORY     Past Medical History:   Diagnosis Date    Acid reflux     PO), Take by mouth daily, Disp: , Rfl:     UNABLE TO FIND, daily Indications: Biosil 6 drops in water, Disp: , Rfl:     UNABLE TO FIND, 500 mg daily Indications: Ashwaganda, Disp: , Rfl:     D-MANNOSE PO, Take 1,500 mg by mouth daily, Disp: , Rfl:     Esomeprazole Magnesium 20 MG TBEC, Take 1 tablet by mouth daily Pt taking OTC as needed, Disp: , Rfl:     MAGNESIUM PO, Take 1 tablet by mouth every morning , Disp: , Rfl:     Omega-3 Fatty Acids (FISH OIL PO), Take 1 capsule by mouth every morning , Disp: , Rfl:     Milk Thistle 1000 MG CAPS, Take 1 capsule by mouth every morning , Disp: , Rfl:     Potassium 99 MG TABS, Take 1 tablet by mouth daily , Disp: , Rfl:     GARLIC PO, Take 1 tablet by mouth every morning , Disp: , Rfl:     loratadine (CLARITIN) 10 MG tablet, Take 1 tablet by mouth daily (Patient taking differently: Take 10 mg by mouth as needed ), Disp: 30 tablet, Rfl: 0    Cholecalciferol (VITAMIN D3) 5000 UNITS TABS, Take 1 tablet by mouth every other day , Disp: , Rfl:     aspirin 81 MG chewable tablet, Take 81 mg by mouth nightly , Disp: , Rfl:     hyoscyamine (LEVSIN) 0.125 MG tablet, Take 0.125 mg by mouth every 4 hours as needed for Cramping., Disp: , Rfl:     ARMOUR THYROID PO, Take 45 mg by mouth every morning , Disp: , Rfl:       SOCIAL HISTORY     Social History     Social History Narrative    Not on file     Social History     Tobacco Use    Smoking status: Former Smoker     Packs/day: 0.50     Years: 29.00     Pack years: 14.50     Types: Cigarettes     Quit date: 2015     Years since quittin.7    Smokeless tobacco: Never Used    Tobacco comment: smoked for 13 years and quit and just start again about 2 years ago   Substance Use Topics    Alcohol use:  Yes     Alcohol/week: 2.0 standard drinks     Types: 2 Glasses of wine per week     Comment: occasional    Drug use: Yes     Types: Marijuana     Comment: medical marijuana prn         ALLERGIES   No Known Allergies      FAMILY HISTORY     Family History   Problem Relation Age of Onset    Heart Disease Maternal Grandmother 52        MI    Heart Disease Maternal Grandfather     Stroke Maternal Grandfather 46    Diabetes Mother     Heart Disease Mother     Other Mother         complications from accident    Cancer Father         bone prostate    Diabetes Father     High Blood Pressure Brother     Cancer Maternal Aunt         possible colon    Cancer Paternal Grandfather     Breast Cancer Neg Hx          PREVIOUS RECORDS   Previous records reviewed:     Recent ED encounter 6/20/2020 for \"memory changes. \"      PHYSICAL EXAM     ED Triage Vitals [03/09/21 1346]   BP Temp Temp Source Pulse Resp SpO2 Height Weight   (!) 155/75 97.6 °F (36.4 °C) Oral 87 18 98 % 5' 9\" (1.753 m) 256 lb (116.1 kg)     Initial vital signs and nursing assessment reviewed and normal except hypertension. Body mass index is 37.8 kg/m². Pulsoximetry is normal per my interpretation. Additional Vital Signs:  Vitals:    03/09/21 1532   BP: 130/64   Pulse: 87   Resp: 18   Temp:    SpO2: 100%       Physical Exam  Constitutional:       Appearance: She is well-developed. HENT:      Head: Normocephalic and atraumatic. Cardiovascular:      Rate and Rhythm: Normal rate and regular rhythm. Pulmonary:      Effort: Pulmonary effort is normal. No respiratory distress. Breath sounds: Normal breath sounds. Abdominal:      General: Abdomen is flat. Bowel sounds are normal.      Palpations: Abdomen is soft. Tenderness: There is abdominal tenderness in the epigastric area and left upper quadrant. There is no right CVA tenderness, left CVA tenderness, guarding or rebound. Skin:     General: Skin is warm and dry. Capillary Refill: Capillary refill takes less than 2 seconds. Neurological:      Mental Status: She is alert. MEDICAL DECISION MAKING   Initial Assessment:   1. Chronic idiopathic constipation.   Benign abdominal exam, history of constipation, and findings on abdominal plain film. 2. Personal history of UTI, unspecified. Minimal urinary symptoms and UA shows only trace leukocyte esterase and no bacteria. 3. Medically stable for discharge  Plan:    Discharge with MiraLAX and Dulcolax.  Close follow-up with PCP. Repeat urinalysis at PCP visit. ED RESULTS   Laboratory results:  Labs Reviewed   CBC WITH AUTO DIFFERENTIAL - Abnormal; Notable for the following components:       Result Value    MCHC 31.8 (*)     RDW-SD 47.2 (*)     All other components within normal limits   COMPREHENSIVE METABOLIC PANEL W/ REFLEX TO MG FOR LOW K - Abnormal; Notable for the following components:    Glucose 113 (*)     All other components within normal limits   URINALYSIS WITH MICROSCOPIC - Abnormal; Notable for the following components:    Leukocyte Esterase, Urine TRACE (*)     All other components within normal limits   OSMOLALITY - Abnormal; Notable for the following components:    Osmolality Calc 273.0 (*)     All other components within normal limits   GLOMERULAR FILTRATION RATE, ESTIMATED - Abnormal; Notable for the following components:    Est, Glom Filt Rate 72 (*)     All other components within normal limits   TROPONIN   LACTIC ACID, PLASMA   TSH WITH REFLEX   ANION GAP       Radiologic studies results:  XR ABDOMEN (2 VIEWS)   Final Result   Constipation. No other acute abnormality            **This report has been created using voice recognition software. It may contain minor errors which are inherent in voice recognition technology. **      Final report electronically signed by Dr. Florin oMrris on 3/9/2021 3:04 PM          ED Medications administered this visit: Medications - No data to display      ED COURSE     ED Course as of Mar 09 1551   Tue Mar 09, 2021   1448 Comprehensive metabolic panel unremarkable except for a glucose of 113 and an estimated glomerular filtration of 72, which is slightly below

## 2021-03-09 NOTE — ED NOTES
Patient resting quietly in cot showing no signs of distress at this time. Updated on POC. Will continue to monitor.       Elias Ramirez RN  03/09/21 6457

## 2021-03-09 NOTE — ED NOTES
Patient presents to ED for worsening abdominal pain over the last couple of months. States she has had RUQ, RLQ, and LUQ abdominal pain since January after being diagnosed with Covid. Patient reports she was diagnosed with a UTI two weeks ago and was prescribed Cipro. Patient states she did not finish the antibiotics because \"it felt like it was irritating everything else going on. \"  Patient shows no signs of distress. Rates pain 3/10. Skin warm and dry. Respirations easy and unlabored.       Arianna Frederick RN  03/09/21 6855

## 2021-05-13 ENCOUNTER — HOSPITAL ENCOUNTER (OUTPATIENT)
Dept: ULTRASOUND IMAGING | Age: 66
Discharge: HOME | End: 2021-05-13
Payer: MEDICARE

## 2021-05-13 DIAGNOSIS — R10.11 ABDOMINAL PAIN, RIGHT UPPER QUADRANT: ICD-10-CM

## 2021-05-13 DIAGNOSIS — K59.00 CONSTIPATION, UNSPECIFIED CONSTIPATION TYPE: ICD-10-CM

## 2021-05-13 PROCEDURE — 76705 ECHO EXAM OF ABDOMEN: CPT

## 2021-06-28 ENCOUNTER — HOSPITAL ENCOUNTER (OUTPATIENT)
Age: 66
Setting detail: SPECIMEN
Discharge: HOME | End: 2021-06-28
Payer: MEDICARE

## 2021-06-30 LAB
CULTURE: NORMAL
Lab: NORMAL
SPECIMEN DESCRIPTION: NORMAL

## 2021-07-01 DIAGNOSIS — N28.89 LEFT RENAL MASS: Primary | ICD-10-CM

## 2021-07-02 ENCOUNTER — HOSPITAL ENCOUNTER (OUTPATIENT)
Dept: MRI IMAGING | Age: 66
Discharge: HOME | End: 2021-07-02
Payer: MEDICARE

## 2021-07-02 ENCOUNTER — TELEPHONE (OUTPATIENT)
Dept: UROLOGY | Age: 66
End: 2021-07-02

## 2021-07-02 DIAGNOSIS — N28.89 LEFT RENAL MASS: ICD-10-CM

## 2021-07-02 LAB — POC CREATININE WHOLE BLOOD: 0.8 MG/DL (ref 0.5–1.2)

## 2021-07-02 PROCEDURE — 82565 ASSAY OF CREATININE: CPT

## 2021-07-02 PROCEDURE — 74183 MRI ABD W/O CNTR FLWD CNTR: CPT

## 2021-07-02 PROCEDURE — 6360000004 HC RX CONTRAST MEDICATION: Performed by: PHYSICIAN ASSISTANT

## 2021-07-02 PROCEDURE — A9579 GAD-BASE MR CONTRAST NOS,1ML: HCPCS | Performed by: PHYSICIAN ASSISTANT

## 2021-07-02 RX ADMIN — GADOTERIDOL 20 ML: 279.3 INJECTION, SOLUTION INTRAVENOUS at 08:25

## 2021-07-02 NOTE — TELEPHONE ENCOUNTER
Does Ms. Delphine Dial have a follow-up visit scheduled to review her MRI of the abdomen? Her last visit was a VV in June 2020.

## 2021-07-06 ENCOUNTER — TELEPHONE (OUTPATIENT)
Dept: UROLOGY | Age: 66
End: 2021-07-06

## 2021-07-06 NOTE — TELEPHONE ENCOUNTER
Anish Olivia does not have a follow up scheduled     When would you like to see her and do you want a VV or in person?      Thank you

## 2021-07-19 ENCOUNTER — OFFICE VISIT (OUTPATIENT)
Dept: UROLOGY | Age: 66
End: 2021-07-19
Payer: MEDICARE

## 2021-07-19 VITALS
DIASTOLIC BLOOD PRESSURE: 82 MMHG | BODY MASS INDEX: 37 KG/M2 | WEIGHT: 249.8 LBS | HEIGHT: 69 IN | SYSTOLIC BLOOD PRESSURE: 114 MMHG

## 2021-07-19 DIAGNOSIS — N28.1 RENAL CYST: Primary | ICD-10-CM

## 2021-07-19 DIAGNOSIS — R30.0 DYSURIA: ICD-10-CM

## 2021-07-19 LAB
BILIRUBIN URINE: NEGATIVE
BLOOD URINE, POC: ABNORMAL
CHARACTER, URINE: CLEAR
COLOR, URINE: YELLOW
GLUCOSE URINE: NEGATIVE MG/DL
KETONES, URINE: ABNORMAL
LEUKOCYTE CLUMPS, URINE: ABNORMAL
NITRITE, URINE: NEGATIVE
PH, URINE: 7 (ref 5–9)
PROTEIN, URINE: NEGATIVE MG/DL
SPECIFIC GRAVITY, URINE: 1.01 (ref 1–1.03)
UROBILINOGEN, URINE: 0.2 EU/DL (ref 0–1)

## 2021-07-19 PROCEDURE — 4040F PNEUMOC VAC/ADMIN/RCVD: CPT | Performed by: PHYSICIAN ASSISTANT

## 2021-07-19 PROCEDURE — 81003 URINALYSIS AUTO W/O SCOPE: CPT | Performed by: PHYSICIAN ASSISTANT

## 2021-07-19 PROCEDURE — G8417 CALC BMI ABV UP PARAM F/U: HCPCS | Performed by: PHYSICIAN ASSISTANT

## 2021-07-19 PROCEDURE — 1090F PRES/ABSN URINE INCON ASSESS: CPT | Performed by: PHYSICIAN ASSISTANT

## 2021-07-19 PROCEDURE — 1036F TOBACCO NON-USER: CPT | Performed by: PHYSICIAN ASSISTANT

## 2021-07-19 PROCEDURE — 1123F ACP DISCUSS/DSCN MKR DOCD: CPT | Performed by: PHYSICIAN ASSISTANT

## 2021-07-19 PROCEDURE — 99213 OFFICE O/P EST LOW 20 MIN: CPT | Performed by: PHYSICIAN ASSISTANT

## 2021-07-19 PROCEDURE — G8400 PT W/DXA NO RESULTS DOC: HCPCS | Performed by: PHYSICIAN ASSISTANT

## 2021-07-19 PROCEDURE — G8427 DOCREV CUR MEDS BY ELIG CLIN: HCPCS | Performed by: PHYSICIAN ASSISTANT

## 2021-07-19 PROCEDURE — 3017F COLORECTAL CA SCREEN DOC REV: CPT | Performed by: PHYSICIAN ASSISTANT

## 2021-07-19 NOTE — PROGRESS NOTES
Ms. Shaniqua Joseph is a 19-year-old female with a history of a left renal mass. She has undergone imaging for the last several months for a 1.7 cm Bosniak 2 cyst located off the mid-pole of the left kidney with peripheral enhancement. A 2.4 cm subcapsular lesion along the posterior aspect of the right hepatic lobe was noted, consistent with a hemangioma.     She reports mild urgency and frequency. She reports that she had went months without having a urinary tract infection until recently when she started PPI therapy. She denies current  flank/abdominal/pelvic pain, dysuria, or gross hematuria. She reports intermittent weakened stream and is no always sure if she is emptying her bladder completely. In regards to her general medical health, she reports acid reflux and constipation. She is following with a gastroenterologist. She denies dyspnea, chest pain/pressure, N/V, leg pain, or lightheadedness. Past Medical History:   Diagnosis Date    Acid reflux     Arthritis     osteo -   knees    Cancer ([facility] 1985? ??    cervical   cryo surgery    Cancer ([facility] [address]     skin-basal cell    Cataracts, bilateral     Constipation     Depression     Epilepsy ([facility] [address] [facility]     Fibromyalgia     Hearing loss     Hiatal hernia     History of Luis-Barr virus infection     Hoarseness     Hypothyroidism     Mono exposure     Night sweats     Osteoarthritis     Sinus infection     SOB (shortness of breath)     UTI (urinary tract infection) 05/30/2018       Past Surgical History:   Procedure Laterality Date    CARDIOVASCULAR STRESS TEST  03-14-11    No evidence of ischemia is noted.     CARPAL TUNNEL RELEASE Right     CHOLECYSTECTOMY  2013    COLONOSCOPY      last one 2014    COLONOSCOPY  11/2016    DOPPLER ECHOCARDIOGRAPHY  03-18-11    EF 55-65%    EKG 12 LEAD (HISTORICAL)  04/2017    FRACTURE SURGERY  2000??    right shoulder    HYSTERECTOMY      2001??    OTHER SURGICAL HISTORY  29 OCT 2014    Cystoscopy (Dr. Vahe Gonzalez, [facility])   [facility] [address] SINUS SURGERY  06/2015    TONSILLECTOMY      as a teenager       Current Outpatient Medications on File Prior to Visit   Medication Sig Dispense Refill    NONFORMULARY UTI clear 1 5 mL dropper full daily      COLLAGEN PO Take by mouth daily      docusate sodium (COLACE) 100 MG capsule Take 1 capsule by mouth 2 times daily 60 capsule 0    Cyclobenzaprine HCl (FLEXERIL PO) Take by mouth as needed      Ascorbic Acid (JOELLE-C PO) Take by mouth      TURMERIC PO Take by mouth      Zinc Sulfate (ZINC 15 PO) Take 30 mg by mouth      UNABLE TO FIND monolaurin 3000 mg      L-LYSINE PO Take by mouth      UNABLE TO FIND UTI clear      B Complex Vitamins (VITAMIN B COMPLEX PO) Take by mouth daily      D-MANNOSE PO Take 1,500 mg by mouth daily      Esomeprazole Magnesium 20 MG TBEC Take 1 tablet by mouth daily Pt taking as needed      MAGNESIUM PO Take 1 tablet by mouth every morning       Omega-3 Fatty Acids (FISH OIL PO) Take 1 capsule by mouth every morning       Potassium 99 MG TABS Take 1 tablet by mouth daily       loratadine (CLARITIN) 10 MG tablet Take 1 tablet by mouth daily (Patient taking differently: Take 10 mg by mouth as needed ) 30 tablet 0    Cholecalciferol (VITAMIN D3) 5000 UNITS TABS Take 1 tablet by mouth every other day       aspirin 81 MG chewable tablet Take 81 mg by mouth nightly       hyoscyamine (LEVSIN) 0.125 MG tablet Take 0.125 mg by mouth every 4 hours as needed for Cramping.  ARMOUR THYROID PO Take 45 mg by mouth every morning       Milk Thistle 1000 MG CAPS Take 1 capsule by mouth every morning  (Patient not taking: Reported on 7/19/2021)       No current facility-administered medications on file prior to visit.        No Known Allergies    Family History   Problem Relation Age of Onset    Heart Disease Maternal Grandmother 52        MI    Heart Disease Maternal Grandfather     Stroke Maternal Grandfather 46    Diabetes Mother     Heart Status:    Intimate Partner Violence:     Fear of Current or Ex-Partner:     Emotionally Abused:     Physically Abused:     Sexually Abused:        Review of Systems  Constitutional: Negative for chills and fever. HENT: Negative for ear pain and facial swelling.    Eyes: Negative for pain and redness. Respiratory: Negative for chest tightness and shortness of breath.    Cardiovascular: Negative for chest pain and leg swelling. Gastrointestinal: Negative for abdominal pain and nausea. Endocrine: Negative for cold intolerance and heat intolerance. Genitourinary: Negative for difficulty urinating and dysuria. Musculoskeletal: Positive for back pain (lower). Negative for joint swelling. Skin: Negative for color change and rash. Allergic/Immunologic: Negative for environmental allergies and food allergies. Neurological: Negative for dizziness and headaches. Hematological: Does not bruise/bleed easily.     Exam    /82   Ht 5' 9\" (1.753 m)   Wt 249 lb 12.8 oz (113.3 kg)   BMI 36.89 kg/m²     Constitutional: Oriented to person, place, and time. Vital signs are normal. Appears well-developed and well-nourished. Cooperative. No distress. HENT:    Head: Normocephalic and atraumatic.    Eyes: EOM are normal. Pupils are equal, round, and reactive to light. Right eye exhibits no discharge. Left eye exhibits no discharge. No scleral icterus. Neck: Trachea normal. No JVD present. Cardiovascular: Normal rate and regular rhythm. S1/S2 normal.  Pulmonary/Chest: Effort normal. No respiratory distress. No wheezes, rhonchi, or rales. Abdominal: Soft. Exhibits no distension. There is no generalized tenderness. There is no rebound, rigidity, or guarding. No CVA tenderness. Musculoskeletal: No pitting edema or calf tenderness. Neurological: Alert and oriented to person, place, and time. No cranial nerve deficit. Skin: Skin is warm and dry. Not diaphoretic. Psychiatric: Normal mood and affect. Behavior is normal.   Nursing note and vitals reviewed. Labs    Results for POC orders placed in visit on 07/19/21   POCT Urinalysis No Micro (Auto)   Result Value Ref Range    Glucose, Ur Negative NEGATIVE mg/dl    Bilirubin Urine Negative     Ketones, Urine Trace (A) NEGATIVE    Specific Gravity, Urine 1.015 1.002 - 1.030    Blood, UA POC Trace-intact NEGATIVE    pH, Urine 7.00 5.0 - 9.0    Protein, Urine Negative NEGATIVE mg/dl    Urobilinogen, Urine 0.20 0.0 - 1.0 eu/dl    Nitrite, Urine Negative NEGATIVE    Leukocyte Clumps, Urine Small (A) NEGATIVE    Color, Urine Yellow YELLOW-STRAW    Character, Urine Clear [id number].CLOUD       Lab Results   Component Value Date    CREATININE 0.8 03/09/2021    BUN 8 03/09/2021     03/09/2021    K 3.9 03/09/2021     03/09/2021    CO2 26 03/09/2021     Radiology:     MRI of the abdomen (7/2/21)    FINDINGS:       Lung bases: Unremarkable.       Liver: 2.3 x 2.1 cm area of enhancement and increased T2 weighted signal intensity in the right lobe of liver adjacent to the hepatorenal fossa consistent with a hemangioma, unchanged since previous study dated 4 June 2020. 3.7 cm cystic lesion  in the    dome of right lobe of liver, 3.3 mm cystic lesion in the right lobe of liver anteriorly and 4.3 mm cystic lesion the left lobe of the liver, approximately unchanged.       Gallbladder/biliary system. Status post cholecystectomy. Mild dilatation of the common hepatic and common bile ducts. No evidence of choledocholithiasis.       Spleen: Unremarkable.       Adrenal glands: Normal.       Pancreas: Slightly atrophic pancreas. No dilatation of the pancreatic duct. The fat planes surrounding the pancreas are preserved. There is no abnormal enhancement noted.       Kidneys: There is a 1.7 x 1.4 cm cystic lesion in the midpole of the left kidney posteriorly with faint enhancement and  layering low T2 weighted signal intensity, unchanged.  This may represent a hemorrhagic Bosniak type II cyst. Small area of very low    signal intensity in the upper pole of the left kidney, unchanged. Tiny Bosniak type I cyst in the right kidney, unchanged. The perinephric fat is unremarkable. There is normal flow in the right and left renal veins. There is no significant retroperitoneal adenopathy.       Vascular: There appears be normal flow in the abdominal aorta, inferior vena cava and portal vein.       Abdominal cavity: Small hiatal hernia. No significant ascites. No significant lymphadenopathy.       MUSCULOSKELETAL: Mild lumbar spondylosis           Impression   1. Stable MRI scan of the abdomen with and without intravenous contrast, no interval change since previous study dated 4 June 2020.   2. 1.7 x 1.4 cm probable Bosniak type II cyst in the left kidney posteriorly, unchanged. Tiny Bosniak type I cyst in the right kidney. Small area of free T2-weighted signal intensity in the upper pole of left kidney. 3. 2.3 x 2.1 cm hemangioma in the right lobe of liver adjacent to the hepatorenal fossa. Tiny cystic lesions in the right and left lobes of liver, unchanged. 4. Mild dilatation of the common duct status post cholecystectomy. No evidence of choledocholithiasis   5. Slightly atrophic pancreas. 6. Small hiatal hernia. 7. Lumbar spondylosis. [place] US of liver and pancreas (5/21)    FINDINGS:        Liver - L= 17.8 cm    Gallbladder - removed    Common Duct - 0.8 cm    Goldman's Sign: No    Pancreas Head - 2.4 cm    Pancreas Body - 2.5 cm    Pancreas Tail - 2.3 cm            Liver: The liver is normal in size, contour, and echogenicity. There is a hypoechoic 2.5 cm lesion in the right hepatic lobe, adjacent to the right kidney. A similar-appearing lesion wasn't present on CT abdomen and pelvis dated 4/13/2019. This likely    representing hemangioma. No abnormally dilated bile ducts are seen.  Color Doppler imaging of the major vessels in and about the liver reveals no abnormalities.      Gallbladder, right kidney: Gallbladder surgically absent. Visualized portions of the right kidney are unremarkable.       Common bile duct: Mildly dilated common duct, as expected post cholecystectomy.       Pancreas: The pancreas is diffusely echogenic, likely reflecting diffuse fatty infiltration. No pancreatic lesions are seen. The pancreatic duct is not dilated.               Impression   1. Likely fatty infiltration of the pancreas diffusely. 2. 2.5 cm lesion in the right hepatic lobe, not appreciably changed from prior CT scan from 2019, likely hemangioma. 3. Prior cholecystectomy. 4. No acute findings. Plan:    1. Left renal cyst- Stable 1.7 cm Bosniak 2 cyst located posteriorly off the mid-pole left kidney. Upon review of past imaging, this mass has been stable in size for > 2 years. Will follow with a renal US in one year. One year follow-up requested by patient.     2. Liver Hemangioma/LIver Cysts- Stable 2.5 cm lesion in the right hepatic lobe, consistent with a hemangioma. Following with GI.      3. Recurrent UTI- On D-Mannose daily. Will send urine for culture. Consider Hiprex if infections increase in frequency. 4. Weakened Stream, Urinary Urgency/Frequency- Patient declined to have PVR obtained today. Sent urine for culture.

## 2021-07-20 ENCOUNTER — TELEPHONE (OUTPATIENT)
Dept: UROLOGY | Age: 66
End: 2021-07-20

## 2021-07-20 LAB — URINE CULTURE, ROUTINE: NORMAL

## 2021-07-20 NOTE — TELEPHONE ENCOUNTER
Ms. Priscilla Oquendo urine culture demonstrates mixed growth. At her visit yesterday, she was exhibiting symptoms. Recommend Guidance Comprehensive panel for further delineation and proper treatment if infection present.

## 2021-07-20 NOTE — TELEPHONE ENCOUNTER
I called and notified the patient that her urine culture demonstrates mixed growth. At her visit yesterday, she was exhibiting symptoms. Recommend Guidance Comprehensive panel for further evaluationt.  mari agreed and appt made for 07/22/21

## 2021-07-22 ENCOUNTER — NURSE ONLY (OUTPATIENT)
Dept: UROLOGY | Age: 66
End: 2021-07-22

## 2021-07-22 DIAGNOSIS — R30.0 DYSURIA: Primary | ICD-10-CM

## 2021-07-22 DIAGNOSIS — N39.0 RECURRENT UTI: ICD-10-CM

## 2021-07-22 PROCEDURE — 99999 PR OFFICE/OUTPT VISIT,PROCEDURE ONLY: CPT | Performed by: NURSE PRACTITIONER

## 2021-07-27 ENCOUNTER — TELEPHONE (OUTPATIENT)
Dept: UROLOGY | Age: 66
End: 2021-07-27

## 2021-07-27 RX ORDER — LEVOFLOXACIN 500 MG/1
500 TABLET, FILM COATED ORAL DAILY
Qty: 7 TABLET | Refills: 0 | Status: SHIPPED | OUTPATIENT
Start: 2021-07-27 | End: 2021-08-03

## 2021-07-27 NOTE — TELEPHONE ENCOUNTER
Patient advised of the Guidance test results. She voiced understanding and will take the levaquin until completed.

## 2021-07-27 NOTE — TELEPHONE ENCOUNTER
Guidance testing with multiple bacteria, most prevalent Viridans all susceptible to Levaquin  Will send Levaquin to pharmacy

## 2021-08-22 ENCOUNTER — HOSPITAL ENCOUNTER (EMERGENCY)
Age: 66
Discharge: HOME | End: 2021-08-22
Payer: MEDICARE

## 2021-08-22 ENCOUNTER — APPOINTMENT (OUTPATIENT)
Dept: GENERAL RADIOLOGY | Age: 66
End: 2021-08-22
Payer: MEDICARE

## 2021-08-22 VITALS
WEIGHT: 245 LBS | SYSTOLIC BLOOD PRESSURE: 154 MMHG | OXYGEN SATURATION: 98 % | RESPIRATION RATE: 16 BRPM | DIASTOLIC BLOOD PRESSURE: 87 MMHG | HEIGHT: 69 IN | HEART RATE: 75 BPM | BODY MASS INDEX: 36.29 KG/M2 | TEMPERATURE: 98.9 F

## 2021-08-22 DIAGNOSIS — R07.81 RIB PAIN ON RIGHT SIDE: Primary | ICD-10-CM

## 2021-08-22 PROCEDURE — 99282 EMERGENCY DEPT VISIT SF MDM: CPT

## 2021-08-22 PROCEDURE — 71101 X-RAY EXAM UNILAT RIBS/CHEST: CPT

## 2021-08-22 PROCEDURE — 6370000000 HC RX 637 (ALT 250 FOR IP): Performed by: PHYSICIAN ASSISTANT

## 2021-08-22 RX ORDER — LIDOCAINE 50 MG/G
1 PATCH TOPICAL DAILY
Qty: 15 PATCH | Refills: 0 | Status: SHIPPED | OUTPATIENT
Start: 2021-08-22 | End: 2022-02-23

## 2021-08-22 RX ORDER — CYCLOBENZAPRINE HCL 10 MG
5 TABLET ORAL 3 TIMES DAILY PRN
Qty: 10 TABLET | Refills: 0 | Status: SHIPPED | OUTPATIENT
Start: 2021-08-22 | End: 2021-09-01

## 2021-08-22 RX ORDER — LIDOCAINE 4 G/G
1 PATCH TOPICAL ONCE
Status: DISCONTINUED | OUTPATIENT
Start: 2021-08-22 | End: 2021-08-22 | Stop reason: HOSPADM

## 2021-08-22 ASSESSMENT — PAIN DESCRIPTION - LOCATION: LOCATION: RIB CAGE

## 2021-08-22 ASSESSMENT — PAIN DESCRIPTION - PROGRESSION: CLINICAL_PROGRESSION: GRADUALLY IMPROVING

## 2021-08-22 ASSESSMENT — PAIN DESCRIPTION - ORIENTATION: ORIENTATION: RIGHT

## 2021-08-22 ASSESSMENT — PAIN SCALES - GENERAL: PAINLEVEL_OUTOF10: 3

## 2021-08-22 ASSESSMENT — PAIN DESCRIPTION - DESCRIPTORS: DESCRIPTORS: SHARP

## 2021-08-22 ASSESSMENT — PAIN DESCRIPTION - PAIN TYPE: TYPE: ACUTE PAIN

## 2021-08-22 ASSESSMENT — PAIN DESCRIPTION - ONSET: ONSET: ON-GOING

## 2021-08-22 ASSESSMENT — PAIN DESCRIPTION - FREQUENCY: FREQUENCY: CONTINUOUS

## 2021-08-22 NOTE — ED NOTES
HPI:   Kristian Mei is a 80year Pt presents to the ER for a R rib injury. Pt states that she was bending over and felt something pop on her R side and she said the pain is not going away.      Solo Gage  08/22/21 [id number] 

## 2021-08-22 NOTE — ED PROVIDER NOTES
[facility]  eMERGENCY dEPARTMENT eNCOUnter          CHIEF COMPLAINT       Chief Complaint   Patient presents with    Rib Pain       Nurses Notes reviewed and I agree except as noted inthe HPI. HISTORY OF PRESENT ILLNESS    Mor Sanford is a 72 y.o. female who presents to the Emergency Department for the evaluation of right rib injury. Patient states that 2 days ago she was bending over when she felt a pop and snap in her right anterior ribs. She states that she took half of an old hydrocodone or oxycodone the day of the injury and it did help with the pain. However, the ibuprofen that she tried for her pain yesterday provided no improvement. She reports a history of similar pain in the past when she has pulled ribs. She also notes a history of osteopenia. Denies any associated fevers, cough, hemoptysis, shortness of breath or new abdominal changes. Reports that she did not want to take any more of the old pain medication because it makes her drowsy. The HPI was provided by the patient. REVIEW OF SYSTEMS     Review of Systems   Cardiovascular: Positive for chest pain (chest wall). All other systems reviewed and are negative. PAST MEDICAL HISTORY    has a past medical history of Acid reflux, Arthritis, Cancer ([address] Cancer ([address] Cataracts, bilateral, Constipation, Depression, Epilepsy ([address] Fibromyalgia, Hearing loss, Hiatal hernia, History of Luis-Barr virus infection, Hoarseness, Hypothyroidism, Mono exposure, Night sweats, Osteoarthritis, Sinus infection, SOB (shortness of breath), and UTI (urinary tract infection). SURGICAL HISTORY      has a past surgical history that includes cardiovascular stress test (03-14-11); doppler echocardiography (03-18-11); Cholecystectomy (2013); Tonsillectomy; Colonoscopy; fracture surgery (2000?? ); Hysterectomy; shoulder surgery; other surgical history (29 OCT 2014); sinus surgery (06/2015);  Colonoscopy (11/2016); ekg 12 lead (historical) (04/2017); and Carpal tunnel release (Right). CURRENT MEDICATIONS       Discharge Medication List as of 8/22/2021 11:15 AM      CONTINUE these medications which have NOT CHANGED    Details   NONFORMULARY UTI clear 1 5 mL dropper full dailyHistorical Med      COLLAGEN PO Take by mouth dailyHistorical Med      docusate sodium (COLACE) 100 MG capsule Take 1 capsule by mouth 2 times daily, Disp-60 capsule, R-0Normal      Ascorbic Acid (JOELLE-C PO) Take by mouthHistorical Med      TURMERIC PO Take by mouthHistorical Med      Zinc Sulfate (ZINC 15 PO) Take 30 mg by mouthHistorical Med      !! UNABLE TO FIND monolaurin 3000 mgHistorical Med      L-LYSINE PO Take by mouthHistorical Med      !! UNABLE TO FIND UTI clearHistorical Med      B Complex Vitamins (VITAMIN B COMPLEX PO) Take by mouth dailyHistorical Med      D-MANNOSE PO Take 1,500 mg by mouth dailyHistorical Med      Esomeprazole Magnesium 20 MG TBEC Take 1 tablet by mouth daily Pt taking as neededHistorical Med      MAGNESIUM PO Take 1 tablet by mouth every morning Historical Med      Omega-3 Fatty Acids (FISH OIL PO) Take 1 capsule by mouth every morning Historical Med      Milk Thistle 1000 MG CAPS Take 1 capsule by mouth every morning Historical Med      Potassium 99 MG TABS Take 1 tablet by mouth daily Historical Med      loratadine (CLARITIN) 10 MG tablet Take 1 tablet by mouth daily, Disp-30 tablet, R-0Print      Cholecalciferol (VITAMIN D3) 5000 UNITS TABS Take 1 tablet by mouth every other day Historical Med      aspirin 81 MG chewable tablet Take 81 mg by mouth nightly Historical Med      hyoscyamine (LEVSIN) 0.125 MG tablet Take 0.125 mg by mouth every 4 hours as needed for Cramping. Historical Med      ARMOUR THYROID PO Take 45 mg by mouth every morning Historical Med       !! - Potential duplicate medications found. Please discuss with provider. ALLERGIES     has No Known Allergies.     FAMILY HISTORY     She indicated that her mother is . She indicated that her father is . She indicated that the status of her brother is unknown. She indicated that her maternal grandmother is . She indicated that her maternal grandfather is . She indicated that the status of her paternal grandfather is unknown. She indicated that the status of her maternal aunt is unknown. She indicated that the status of her neg hx is unknown.   family history includes Cancer in her father, maternal aunt, and paternal grandfather; Diabetes in her father and mother; Heart Disease in her maternal grandfather and mother; Heart Disease (age of onset: 52) in her maternal grandmother; High Blood Pressure in her brother; Other in her mother; Stroke (age of onset: 46) in her maternal grandfather. SOCIAL HISTORY      reports that she quit smoking about 6 years ago. Her smoking use included cigarettes. She has a 14.50 pack-year smoking history. She has never used smokeless tobacco. She reports current alcohol use of about 2.0 standard drinks of alcohol per week. She reports current drug use. Drug: Marijuana. PHYSICAL EXAM     INITIAL VITALS:  height is 5' 9\" (1.753 m) and weight is 245 lb (111.1 kg). Her oral temperature is 98.9 °F (37.2 °C). Her blood pressure is 154/87 (abnormal) and her pulse is 75. Her respiration is 16 and oxygen saturation is 98%. Physical Exam  Vitals and nursing note reviewed. Constitutional:       Appearance: Normal appearance. HENT:      Head: Normocephalic and atraumatic. Eyes:      Conjunctiva/sclera: Conjunctivae normal.   Cardiovascular:      Rate and Rhythm: Normal rate and regular rhythm. Heart sounds: Normal heart sounds. No murmur heard. Pulmonary:      Effort: Pulmonary effort is normal. No respiratory distress. Breath sounds: Normal breath sounds. No wheezing or rhonchi. Chest:      Chest wall: Tenderness present. No deformity, swelling, crepitus or edema.           Comments: Tenderness without deformity, crepitus, cutaneous changes noted over the anterior right fifth through seventh ribs. There is also referred pain to this location with palpation of the lower sternal costal joint on the right  Abdominal:      Palpations: Abdomen is soft. Tenderness: There is no abdominal tenderness. There is no right CVA tenderness, left CVA tenderness, guarding or rebound. Musculoskeletal:      Cervical back: Normal range of motion. Skin:     General: Skin is warm and dry. Neurological:      General: No focal deficit present. Mental Status: She is alert and oriented to person, place, and time. Psychiatric:         Mood and Affect: Mood normal.         DIFFERENTIAL DIAGNOSIS:   Differential diagnoses are discussed    DIAGNOSTIC RESULTS     EKG: All EKG's are interpreted by the Emergency Department Physician who either signs or Co-signsthis chart in the absence of a cardiologist.        RADIOLOGY: non-plain film images(s) such as CT, Ultrasound and MRI are read by the radiologist.    XR RIBS RIGHT INCLUDE CHEST (MIN 3 VIEWS)   Final Result       1. Slight irregularity involving the right fifth and sixth ribs laterally, please correlate clinically. 2. The remaining ribs are intact. 3. There is no definite pneumothorax. .. **This report has been created using voice recognition software. It may contain minor errors which are inherent in voice recognition technology. **      Final report electronically signed by DR Shruti Mcarthur on 8/22/2021 10:54 AM          LABS:    Labs Reviewed - No data to display    EMERGENCY DEPARTMENT COURSE:   Vitals:    Vitals:    08/22/21 0940   BP: (!) 154/87   Pulse: 75   Resp: 16   Temp: 98.9 °F (37.2 °C)   TempSrc: Oral   SpO2: 98%   Weight: 245 lb (111.1 kg)   Height: 5' 9\" (1.753 m)      12:59 PM EDT: The patient was seen and evaluated. Patient presents for complaints of right-sided rib pain that began after bending a couple days ago. She had a popping sensation in the area without any associated respiratory complaints. She arrives with reassuring vital signs today aside from some mild hypertension. No risk factors for DVT/PE identified. Rib x-ray shows slight irregularity involving the right fifth and sixth ribs laterally with remaining ribs intact and no definite pneumothorax. Results were discussed with the patient. She is corresponding tenderness. We discussed risks and benefits of CT of the chest as well as management similarity for her current symptoms versus identified acute fracture. She had CT of the chest performed 3 to 4 years ago which had no noted pulmonary mass or other abnormality at that time and after discussion, the patient feels comfortable with deferring any further imaging today. She can follow with PCP should she have persistent or new symptoms and return precautions were discussed with the patient. She had improvement in pain with lidocaine patch here in the department. Will give prescription for this at home as well as for low-dose Flexeril. Patient was agreeable with the above plan and denied further needs upon discharge. CRITICAL CARE:   None    CONSULTS:  None    PROCEDURES:  None    FINAL IMPRESSION      1. Rib pain on right side          DISPOSITION/PLAN   Discharge    PATIENT REFERRED TO:  Wily Adler, [address] [facility]  [address]  [phone]    In 1 week  As needed    [facility]am[facility] See EMERGENCY DEPT  [address]  [address] [place] [address]  [phone]    If symptoms worsen      DISCHARGEMEDICATIONS:  Discharge Medication List as of 8/22/2021 11:15 AM      START taking these medications    Details   lidocaine (LIDODERM) 5 % Place 1 patch onto the skin daily 12 hours on, 12 hours off., Disp-15 patch, R-0Normal      cyclobenzaprine (FLEXERIL) 10 MG tablet Take 0.5 tablets by mouth 3 times daily as needed for Muscle spasms . WARNING: Medication may make you drowsy/sleepy.  Do not take before driving or operating heavy machinery. , Disp-10 tablet, R-0Normal             (Please note that portions of this note were completedwith a voice recognition program.  Efforts were made to edit the dictations but occasionally words are mis-transcribed.)        Ezequiel Kapadia PA-C  08/22/21 [id number]

## 2021-10-29 ENCOUNTER — TELEPHONE (OUTPATIENT)
Dept: CARDIOLOGY CLINIC | Age: 66
End: 2021-10-29

## 2021-11-01 ENCOUNTER — OFFICE VISIT (OUTPATIENT)
Dept: CARDIOLOGY CLINIC | Age: 66
End: 2021-11-01
Payer: MEDICARE

## 2021-11-01 VITALS
WEIGHT: 243 LBS | HEART RATE: 100 BPM | BODY MASS INDEX: 35.99 KG/M2 | HEIGHT: 69 IN | SYSTOLIC BLOOD PRESSURE: 142 MMHG | DIASTOLIC BLOOD PRESSURE: 78 MMHG

## 2021-11-01 DIAGNOSIS — I49.8 FLUTTERING HEART: ICD-10-CM

## 2021-11-01 DIAGNOSIS — R07.9 CHEST PAIN, UNSPECIFIED TYPE: Primary | ICD-10-CM

## 2021-11-01 DIAGNOSIS — R53.83 OTHER FATIGUE: ICD-10-CM

## 2021-11-01 PROCEDURE — G8417 CALC BMI ABV UP PARAM F/U: HCPCS | Performed by: INTERNAL MEDICINE

## 2021-11-01 PROCEDURE — G8427 DOCREV CUR MEDS BY ELIG CLIN: HCPCS | Performed by: INTERNAL MEDICINE

## 2021-11-01 PROCEDURE — 1036F TOBACCO NON-USER: CPT | Performed by: INTERNAL MEDICINE

## 2021-11-01 PROCEDURE — 4040F PNEUMOC VAC/ADMIN/RCVD: CPT | Performed by: INTERNAL MEDICINE

## 2021-11-01 PROCEDURE — 1123F ACP DISCUSS/DSCN MKR DOCD: CPT | Performed by: INTERNAL MEDICINE

## 2021-11-01 PROCEDURE — G8484 FLU IMMUNIZE NO ADMIN: HCPCS | Performed by: INTERNAL MEDICINE

## 2021-11-01 PROCEDURE — G8400 PT W/DXA NO RESULTS DOC: HCPCS | Performed by: INTERNAL MEDICINE

## 2021-11-01 PROCEDURE — 3017F COLORECTAL CA SCREEN DOC REV: CPT | Performed by: INTERNAL MEDICINE

## 2021-11-01 PROCEDURE — 99213 OFFICE O/P EST LOW 20 MIN: CPT | Performed by: INTERNAL MEDICINE

## 2021-11-01 PROCEDURE — 1090F PRES/ABSN URINE INCON ASSESS: CPT | Performed by: INTERNAL MEDICINE

## 2021-11-01 RX ORDER — FAMOTIDINE 20 MG/1
20 TABLET, FILM COATED ORAL PRN
COMMUNITY
End: 2022-02-23

## 2021-11-01 RX ORDER — VITAMIN E 268 MG
400 CAPSULE ORAL DAILY
COMMUNITY

## 2021-11-01 NOTE — PROGRESS NOTES
[address] [facility] [address] [place] [address]  Dept: [phone]  Dept Fax: [phone]  Loc: [phone]    Visit Date: 11/1/2021    Ms. Connie Hewitt is a 77 y.o. female  who presented for:  Chief Complaint   Patient presents with    Follow-up       HPI:   HPI   78 yo F presenting for evaluation of flutter and chest pressure. Started last week. Getting worse. Diffuse body aches. She has a hx of FM.  2/10.  2018 cath without obstruction. She had Covid 10 months ago and she feels like that can be playing a part. Pain to her jaws, intermittent, into the shoulder blade. Occurs every day. With and without exertion. Not waking up from sleep. She feels like she wants to sleep daily. Takes baby ASA. Flutter, lasts seconds, started last weekend. No syncope.        Current Outpatient Medications:     vitamin E 400 UNIT capsule, Take 400 Units by mouth daily, Disp: , Rfl:     famotidine (PEPCID) 20 MG tablet, Take 20 mg by mouth as needed, Disp: , Rfl:     lidocaine (LIDODERM) 5 %, Place 1 patch onto the skin daily 12 hours on, 12 hours off., Disp: 15 patch, Rfl: 0    NONFORMULARY, UTI clear 1 5 mL dropper full daily, Disp: , Rfl:     COLLAGEN PO, Take by mouth daily, Disp: , Rfl:     docusate sodium (COLACE) 100 MG capsule, Take 1 capsule by mouth 2 times daily, Disp: 60 capsule, Rfl: 0    Ascorbic Acid (JOELLE-C PO), Take by mouth, Disp: , Rfl:     TURMERIC PO, Take by mouth, Disp: , Rfl:     Zinc Sulfate (ZINC 15 PO), Take 30 mg by mouth, Disp: , Rfl:     UNABLE TO FIND, monolaurin 3000 mg, Disp: , Rfl:     L-LYSINE PO, Take by mouth, Disp: , Rfl:     UNABLE TO FIND, UTI clear, Disp: , Rfl:     B Complex Vitamins (VITAMIN B COMPLEX PO), Take by mouth daily, Disp: , Rfl:     D-MANNOSE PO, Take 1,500 mg by mouth daily, Disp: , Rfl:     Esomeprazole Magnesium 20 MG TBEC, Take 1 tablet by mouth daily Pt taking as needed, Disp: , Rfl:     MAGNESIUM PO, Take 1 tablet by mouth every morning , Disp: , Rfl:     Omega-3 Fatty Acids (FISH OIL PO), Take 1 capsule by mouth every morning , Disp: , Rfl:     Milk Thistle 1000 MG CAPS, Take 1 capsule by mouth every morning , Disp: , Rfl:     Potassium 99 MG TABS, Take 1 tablet by mouth daily , Disp: , Rfl:     loratadine (CLARITIN) 10 MG tablet, Take 1 tablet by mouth daily (Patient taking differently: Take 10 mg by mouth as needed ), Disp: 30 tablet, Rfl: 0    Cholecalciferol (VITAMIN D3) 5000 UNITS TABS, Take 1 tablet by mouth every other day , Disp: , Rfl:     aspirin 81 MG chewable tablet, Take 81 mg by mouth nightly , Disp: , Rfl:     hyoscyamine (LEVSIN) 0.125 MG tablet, Take 0.125 mg by mouth every 4 hours as needed for Cramping., Disp: , Rfl:     ARMOUR THYROID PO, Take 45 mg by mouth every morning , Disp: , Rfl:     Past Medical History  Lanre Gonzalez  has a past medical history of Acid reflux, Arthritis, Cancer (HCC), Cancer ([address] Cataracts, bilateral, Constipation, Depression, Epilepsy ([address] Fibromyalgia, Hearing loss, Hiatal hernia, History of Luis-Barr virus infection, Hoarseness, Hypothyroidism, Mono exposure, Night sweats, Osteoarthritis, Sinus infection, SOB (shortness of breath), and UTI (urinary tract infection). Social History  Lanre Gonzalez  reports that she quit smoking about 6 years ago. Her smoking use included cigarettes. She has a 14.50 pack-year smoking history. She has never used smokeless tobacco. She reports current alcohol use of about 2.0 standard drinks of alcohol per week. She reports current drug use. Drug: Marijuana Candiss Littler). Family History  Alesha family history includes Cancer in her father, maternal aunt, and paternal grandfather; Diabetes in her father and mother; Heart Disease in her maternal grandfather and mother; Heart Disease (age of onset: 52) in her maternal grandmother; High Blood Pressure in her brother;  Other in her mother; Stroke (age of onset: 46) in her maternal grandfather. There is no family history of bicuspid aortic valve, aneurysms, heart transplant, pacemakers, defibrillators, or sudden cardiac death. Past Surgical History   Past Surgical History:   Procedure Laterality Date    CARDIOVASCULAR STRESS TEST  03-14-11    No evidence of ischemia is noted.  CARPAL TUNNEL RELEASE Right     CHOLECYSTECTOMY  2013    COLONOSCOPY      last one 2014    COLONOSCOPY  11/2016    DOPPLER ECHOCARDIOGRAPHY  03-18-11    EF 55-65%    EKG 12 LEAD (HISTORICAL)  04/2017    FRACTURE SURGERY  2000??    right shoulder    HYSTERECTOMY      2001??    OTHER SURGICAL HISTORY  29 OCT 2014    Cystoscopy ([facility]. Haley Beck[facility], [facility])   [facility] SINUS SURGERY  06/2015    TONSILLECTOMY      as a teenager       Review of Systems   Constitutional: Negative for chills and fever  HENT: Negative for congestion, sinus pressure, sneezing and sore throat. Eyes: Negative for pain, discharge, redness and itching. Respiratory: Negative for apnea, cough  Gastrointestinal: Negative for blood in stool, constipation, diarrhea   Endocrine: Negative for cold intolerance, heat intolerance, polydipsia. Genitourinary: Negative for dysuria, enuresis, flank pain and hematuria. Musculoskeletal: Negative for arthralgias, joint swelling and neck pain. Neurological: Negative for numbness and headaches. Psychiatric/Behavioral: Negative for agitation, confusion, decreased concentration and dysphoric mood. Objective:     BP (!) 142/78   Pulse 100 Comment: Irregular  Ht 5' 9\" (1.753 m)   Wt 243 lb (110.2 kg)   BMI 35.88 kg/m²     Wt Readings from Last 3 Encounters:   11/01/21 243 lb (110.2 kg)   08/22/21 245 lb (111.1 kg)   07/19/21 249 lb 12.8 oz (113.3 kg)     BP Readings from Last 3 Encounters:   11/01/21 (!) 142/78   08/22/21 (!) 154/87   07/19/21 114/82       Nursing note and vitals reviewed. Physical Exam   Constitutional: Oriented to person, place, and time.  Appears well-developed and well-nourished. HENT:   Head: Normocephalic and atraumatic. Eyes: EOM are normal. Pupils are equal, round, and reactive to light. Neck: Normal range of motion. Neck supple. No JVD present. Cardiovascular: Normal rate, regular rhythm, normal heart sounds and intact distal pulses. No murmur heard. Pulmonary/Chest: Effort normal and breath sounds normal. No respiratory distress. No wheezes. No rales. Abdominal: Soft. Bowel sounds are normal. No distension. There is no tenderness. Musculoskeletal: Normal range of motion. No edema. Neurological: Alert and oriented to person, place, and time. No cranial nerve deficit. Coordination normal.   Skin: Skin is warm and dry. Psychiatric: Normal mood and affect.        No results found for: CKTOTAL, CKMB, CKMBINDEX    Lab Results   Component Value Date    WBC 6.0 03/09/2021    RBC 4.60 03/09/2021    HGB 13.8 03/09/2021    HCT 43.4 03/09/2021    MCV 94.3 03/09/2021    MCH 30.0 03/09/2021    MCHC 31.8 03/09/2021    RDW 13.9 05/18/2018     03/09/2021    MPV 10.4 03/09/2021       Lab Results   Component Value Date     03/09/2021    K 3.9 03/09/2021     03/09/2021    CO2 26 03/09/2021    BUN 8 03/09/2021    LABALBU 4.1 03/09/2021    CREATININE 0.8 03/09/2021    CALCIUM 9.7 03/09/2021    LABGLOM 72 03/09/2021    GLUCOSE 113 03/09/2021       Lab Results   Component Value Date    ALKPHOS 99 03/09/2021    ALT 18 03/09/2021    AST 20 03/09/2021    PROT 7.2 03/09/2021    BILITOT 0.7 03/09/2021    BILIDIR <0.2 06/23/2020    LABALBU 4.1 03/09/2021       Lab Results   Component Value Date    MG 1.8 04/13/2019       Lab Results   Component Value Date    INR 1.08 05/18/2018         No results found for: LABA1C    Lab Results   Component Value Date    TRIG 78 04/20/2018    HDL 49 04/20/2018    LDLCALC 102 04/20/2018       Lab Results   Component Value Date    TSH 2.120 03/09/2021         Testing Reviewed:      I have individually reviewed the cardiac test below:    ECHO: No results found for this or any previous visit. Assessment/Plan   Flutters  Chest pressure  2018 negative cath  Preserved LVEF  Hx of FM   Fatigue - needs MUKESH evaluation  Check 30 day event monitor and lexiscan. If negative, needs better treatment of psychiatric issues/FM. No reflux symptoms. Discussed symptoms needing emergency care. Discussed diet/exercise/BP/weight loss/health lifestyle choices/lipids; the patient understands the goals and will try to comply.     Disposition:  Testing, then f/u         Electronically signed by Ness Casey MD   11/1/2021 at 4:01 PM EDT

## 2021-11-02 ENCOUNTER — TELEPHONE (OUTPATIENT)
Dept: CARDIOLOGY CLINIC | Age: 66
End: 2021-11-02

## 2021-11-09 ENCOUNTER — HOSPITAL ENCOUNTER (OUTPATIENT)
Age: 66
Setting detail: SPECIMEN
Discharge: HOME | End: 2021-11-09
Payer: MEDICARE

## 2021-11-10 ENCOUNTER — HOSPITAL ENCOUNTER (OUTPATIENT)
Dept: NON INVASIVE DIAGNOSTICS | Age: 66
Discharge: HOME | End: 2021-11-10
Payer: MEDICARE

## 2021-11-10 DIAGNOSIS — R07.9 CHEST PAIN, UNSPECIFIED TYPE: ICD-10-CM

## 2021-11-10 DIAGNOSIS — I49.8 FLUTTERING HEART: ICD-10-CM

## 2021-11-10 LAB — FERRITIN: 110 UG/L (ref 13–150)

## 2021-11-10 PROCEDURE — 6360000002 HC RX W HCPCS

## 2021-11-10 PROCEDURE — 93017 CV STRESS TEST TRACING ONLY: CPT | Performed by: INTERNAL MEDICINE

## 2021-11-10 PROCEDURE — A9500 TC99M SESTAMIBI: HCPCS | Performed by: INTERNAL MEDICINE

## 2021-11-10 PROCEDURE — 78452 HT MUSCLE IMAGE SPECT MULT: CPT

## 2021-11-10 PROCEDURE — 93270 REMOTE 30 DAY ECG REV/REPORT: CPT

## 2021-11-10 PROCEDURE — 3430000000 HC RX DIAGNOSTIC RADIOPHARMACEUTICAL: Performed by: INTERNAL MEDICINE

## 2021-11-10 RX ADMIN — Medication 33.6 MILLICURIE: at 08:50

## 2021-11-10 RX ADMIN — Medication 9.6 MILLICURIE: at 07:35

## 2021-11-10 NOTE — PROCEDURES
The skin was prepped and a 30 day cardiac event monitor was applied. The patient was instructed on the documentation, symptoms, purpose of the monitor, as well as the things to avoid while wearing the monitor. The patient was instructed to remove the monitor on 12/10/21.  Serial Number applied [id number]

## 2021-11-12 PROCEDURE — 93016 CV STRESS TEST SUPVJ ONLY: CPT | Performed by: INTERNAL MEDICINE

## 2021-11-12 PROCEDURE — 93018 CV STRESS TEST I&R ONLY: CPT | Performed by: INTERNAL MEDICINE

## 2021-11-12 PROCEDURE — 78452 HT MUSCLE IMAGE SPECT MULT: CPT | Performed by: INTERNAL MEDICINE

## 2021-11-15 RX ORDER — METOPROLOL SUCCINATE 25 MG/1
25 TABLET, EXTENDED RELEASE ORAL DAILY
COMMUNITY
End: 2021-11-15 | Stop reason: SDUPTHER

## 2021-11-15 RX ORDER — ATORVASTATIN CALCIUM 40 MG/1
40 TABLET, FILM COATED ORAL DAILY
Qty: 30 TABLET | Refills: 3 | Status: SHIPPED | OUTPATIENT
Start: 2021-11-15 | End: 2022-05-13

## 2021-11-15 RX ORDER — METOPROLOL SUCCINATE 25 MG/1
25 TABLET, EXTENDED RELEASE ORAL DAILY
Qty: 30 TABLET | Refills: 3 | Status: SHIPPED | OUTPATIENT
Start: 2021-11-15 | End: 2021-12-22 | Stop reason: SDUPTHER

## 2021-11-15 RX ORDER — ISOSORBIDE MONONITRATE 30 MG/1
30 TABLET, EXTENDED RELEASE ORAL DAILY
Qty: 30 TABLET | Refills: 3 | Status: SHIPPED | OUTPATIENT
Start: 2021-11-15 | End: 2022-01-27

## 2021-11-15 RX ORDER — ISOSORBIDE MONONITRATE 30 MG/1
30 TABLET, EXTENDED RELEASE ORAL DAILY
COMMUNITY
End: 2021-11-15 | Stop reason: SDUPTHER

## 2021-11-15 RX ORDER — ATORVASTATIN CALCIUM 40 MG/1
40 TABLET, FILM COATED ORAL DAILY
COMMUNITY
End: 2021-11-15 | Stop reason: SDUPTHER

## 2021-11-15 NOTE — TELEPHONE ENCOUNTER
Results of stress test  Summary   Mild to moderate inferior defect from mid ventricle to apex that may be   consistent with ischemia in the RCA territory, however, artifact cannot be   excluded. OV notes from 11/1/2021    Check 30 day event monitor and lexiscan. If negative, needs better treatment of psychiatric issues/FM. No reflux symptoms. Discussed symptoms needing emergency care.

## 2021-12-16 NOTE — PROCEDURES
[address] [id number]                                 EVENT MONITOR    PATIENT NAME: Dale Zuluaga                       :        1955  MED REC NO:   [id number]                           ROOM:  ACCOUNT NO:   [de-identified]                           ADMIT DATE: 11/10/2021  PROVIDER:     Anum Yusuf MD    TEST TYPE:  30-day event monitor. MONITORING PERIOD:  11/10/2021 to 2021. INDICATION: Chest pain. FINDINGS:  Sinus rhythm without any ST-T wave changes. Episodes of  flutters and skipped beats that were associated with PVCs. No findings  of high-grade AV block, AFib,VT/VF. SUMMARY:  Symptomatic PVCs.         Sb Mina MD    D: 12/15/2021 9:13:22       T: 12/15/2021 10:51:41     [email or web address]  Job#: [id number]     Doc#: [id number]    CC:

## 2021-12-21 ENCOUNTER — TELEPHONE (OUTPATIENT)
Dept: UROLOGY | Age: 66
End: 2021-12-21

## 2021-12-21 NOTE — TELEPHONE ENCOUNTER
Patient scheduled for RENAL COMPLETE US  at [address] [place] [address] on July 18, 2022 WITH ARRIVAL TIME OF 8:45AM.  Patient advised of instructions NO CARBONATION WELL HYDRATED.   Order mailed to patient

## 2021-12-22 RX ORDER — METOPROLOL SUCCINATE 50 MG/1
50 TABLET, EXTENDED RELEASE ORAL DAILY
Qty: 30 TABLET | Refills: 1 | Status: SHIPPED | OUTPATIENT
Start: 2021-12-22 | End: 2022-02-23 | Stop reason: SDUPTHER

## 2021-12-22 NOTE — TELEPHONE ENCOUNTER
FINDINGS:  Sinus rhythm without any ST-T wave changes. Episodes of  flutters and skipped beats that were associated with PVCs. No findings  of high-grade AV block, AFib, _____.     SUMMARY:  Symptomatic PVCs.       VO Dr. Demarco Pouch  Increase Toprol to 50 mg daily  Need follow up appointment scheduled         Left message for patient to call office

## 2022-01-27 ENCOUNTER — OFFICE VISIT (OUTPATIENT)
Dept: CARDIOLOGY CLINIC | Age: 67
End: 2022-01-27
Payer: MEDICARE

## 2022-01-27 VITALS
HEART RATE: 80 BPM | DIASTOLIC BLOOD PRESSURE: 80 MMHG | WEIGHT: 246 LBS | BODY MASS INDEX: 36.43 KG/M2 | HEIGHT: 69 IN | SYSTOLIC BLOOD PRESSURE: 138 MMHG

## 2022-01-27 DIAGNOSIS — I49.8 FLUTTERING HEART: Primary | ICD-10-CM

## 2022-01-27 DIAGNOSIS — R07.9 CHEST PAIN, UNSPECIFIED TYPE: ICD-10-CM

## 2022-01-27 DIAGNOSIS — I10 ESSENTIAL HYPERTENSION: ICD-10-CM

## 2022-01-27 PROCEDURE — G8427 DOCREV CUR MEDS BY ELIG CLIN: HCPCS | Performed by: INTERNAL MEDICINE

## 2022-01-27 PROCEDURE — G8400 PT W/DXA NO RESULTS DOC: HCPCS | Performed by: INTERNAL MEDICINE

## 2022-01-27 PROCEDURE — 1123F ACP DISCUSS/DSCN MKR DOCD: CPT | Performed by: INTERNAL MEDICINE

## 2022-01-27 PROCEDURE — G8484 FLU IMMUNIZE NO ADMIN: HCPCS | Performed by: INTERNAL MEDICINE

## 2022-01-27 PROCEDURE — G8417 CALC BMI ABV UP PARAM F/U: HCPCS | Performed by: INTERNAL MEDICINE

## 2022-01-27 PROCEDURE — 3017F COLORECTAL CA SCREEN DOC REV: CPT | Performed by: INTERNAL MEDICINE

## 2022-01-27 PROCEDURE — 1036F TOBACCO NON-USER: CPT | Performed by: INTERNAL MEDICINE

## 2022-01-27 PROCEDURE — 99214 OFFICE O/P EST MOD 30 MIN: CPT | Performed by: INTERNAL MEDICINE

## 2022-01-27 PROCEDURE — 4040F PNEUMOC VAC/ADMIN/RCVD: CPT | Performed by: INTERNAL MEDICINE

## 2022-01-27 PROCEDURE — 1090F PRES/ABSN URINE INCON ASSESS: CPT | Performed by: INTERNAL MEDICINE

## 2022-01-27 RX ORDER — AMLODIPINE BESYLATE 5 MG/1
5 TABLET ORAL DAILY
Qty: 30 TABLET | Refills: 3 | Status: SHIPPED | OUTPATIENT
Start: 2022-01-27 | End: 2022-05-13

## 2022-01-27 RX ORDER — LEVOTHYROXINE, LIOTHYRONINE 19; 4.5 UG/1; UG/1
TABLET ORAL
COMMUNITY
Start: 2022-01-20 | End: 2022-01-27

## 2022-01-27 NOTE — PROGRESS NOTES
Pt stopped taking Imdur a couple days after it was started due to side effects    Only taking 25 mg of metoprolol

## 2022-01-27 NOTE — PROGRESS NOTES
mouth daily, Disp: , Rfl:     D-MANNOSE PO, Take 1,500 mg by mouth daily, Disp: , Rfl:     Esomeprazole Magnesium 20 MG TBEC, Take 1 tablet by mouth daily Pt taking as needed, Disp: , Rfl:     MAGNESIUM PO, Take 1 tablet by mouth every morning , Disp: , Rfl:     Omega-3 Fatty Acids (FISH OIL PO), Take 1 capsule by mouth every morning , Disp: , Rfl:     Milk Thistle 1000 MG CAPS, Take 1 capsule by mouth every morning , Disp: , Rfl:     Potassium 99 MG TABS, Take 1 tablet by mouth daily , Disp: , Rfl:     loratadine (CLARITIN) 10 MG tablet, Take 1 tablet by mouth daily (Patient taking differently: Take 10 mg by mouth as needed ), Disp: 30 tablet, Rfl: 0    Cholecalciferol (VITAMIN D3) 5000 UNITS TABS, Take 1 tablet by mouth every other day , Disp: , Rfl:     aspirin 81 MG chewable tablet, Take 81 mg by mouth nightly , Disp: , Rfl:     hyoscyamine (LEVSIN) 0.125 MG tablet, Take 0.125 mg by mouth every 4 hours as needed for Cramping., Disp: , Rfl:     ARMOUR THYROID PO, Take 45 mg by mouth See Admin Instructions M W F 60mg Tues Thur Sat Sun 45mg, Disp: , Rfl:     Past Medical History  Farzana Esquivel  has a past medical history of Acid reflux, Arthritis, Cancer ([address] Cancer ([address] Cataracts, bilateral, Constipation, Depression, Epilepsy ([address] Fibromyalgia, Hearing loss, Hiatal hernia, History of Luis-Barr virus infection, Hoarseness, Hypothyroidism, Mono exposure, Night sweats, Osteoarthritis, Sinus infection, SOB (shortness of breath), and UTI (urinary tract infection). Social History  Farzana Esquivel  reports that she quit smoking about 6 years ago. Her smoking use included cigarettes. She has a 14.50 pack-year smoking history. She has never used smokeless tobacco. She reports current alcohol use of about 2.0 standard drinks of alcohol per week. She reports current drug use. Drug: Marijuana Olene Deborah).     Family History  Alesha family history includes Cancer in her father, maternal aunt, and paternal grandfather; Diabetes in her father and mother; Heart Disease in her maternal grandfather and mother; Heart Disease (age of onset: 52) in her maternal grandmother; High Blood Pressure in her brother; Other in her mother; Stroke (age of onset: 46) in her maternal grandfather. There is no family history of bicuspid aortic valve, aneurysms, heart transplant, pacemakers, defibrillators, or sudden cardiac death. Past Surgical History   Past Surgical History:   Procedure Laterality Date    CARDIOVASCULAR STRESS TEST  03-14-11    No evidence of ischemia is noted.  CARPAL TUNNEL RELEASE Right     CHOLECYSTECTOMY  2013    COLONOSCOPY      last one 2014    COLONOSCOPY  11/2016    DOPPLER ECHOCARDIOGRAPHY  03-18-11    EF 55-65%    EKG 12 LEAD (HISTORICAL)  04/2017    FRACTURE SURGERY  2000??    right shoulder    HYSTERECTOMY      2001??    OTHER SURGICAL HISTORY  29 OCT 2014    Cystoscopy (Dr. Selvin Serrano, [address] [place] [address])   [facility] SINUS SURGERY  06/2015    TONSILLECTOMY      as a teenager       Review of Systems   Constitutional: Negative for chills and fever  HENT: Negative for congestion, sinus pressure, sneezing and sore throat. Eyes: Negative for pain, discharge, redness and itching. Respiratory: Negative for apnea, cough  Gastrointestinal: Negative for blood in stool, constipation, diarrhea   Endocrine: Negative for cold intolerance, heat intolerance, polydipsia. Genitourinary: Negative for dysuria, enuresis, flank pain and hematuria. Musculoskeletal: Negative for arthralgias, joint swelling and neck pain. Neurological: Negative for numbness and headaches. Psychiatric/Behavioral: Negative for agitation, confusion, decreased concentration and dysphoric mood.      Objective:     BP (!) 160/82   Pulse 80   Ht 5' 9\" (1.753 m)   Wt 246 lb (111.6 kg)   BMI 36.33 kg/m²     Wt Readings from Last 3 Encounters:   01/27/22 246 lb (111.6 kg)   11/01/21 243 lb (110.2 kg)   08/22/21 245 lb (111.1 kg)     BP Readings from Last 3 Encounters:   01/27/22 (!) 160/82   11/01/21 (!) 142/78   08/22/21 (!) 154/87       Nursing note and vitals reviewed. Physical Exam   Constitutional: Oriented to person, place, and time. Appears well-developed and well-nourished. HENT:   Head: Normocephalic and atraumatic. Eyes: EOM are normal. Pupils are equal, round, and reactive to light. Neck: Normal range of motion. Neck supple. No JVD present. Cardiovascular: Normal rate, regular rhythm, normal heart sounds and intact distal pulses. No murmur heard. Pulmonary/Chest: Effort normal and breath sounds normal. No respiratory distress. No wheezes. No rales. Abdominal: Soft. Bowel sounds are normal. No distension. There is no tenderness. Musculoskeletal: Normal range of motion. No edema. Neurological: Alert and oriented to person, place, and time. No cranial nerve deficit. Coordination normal.   Skin: Skin is warm and dry. Psychiatric: Normal mood and affect.        No results found for: CKTOTAL, CKMB, CKMBINDEX    Lab Results   Component Value Date    WBC 7.4 12/31/2021    WBC 6.0 03/09/2021    RBC 4.32 12/31/2021    HGB 13.1 12/31/2021    HCT 39.1 12/31/2021    MCV 90 12/31/2021    MCH 30.3 12/31/2021    MCHC 33.6 12/31/2021    RDW 14.4 12/31/2021     12/31/2021     03/09/2021    MPV 9.0 12/31/2021       Lab Results   Component Value Date     12/31/2021    K 4.3 12/31/2021    K 3.9 03/09/2021     12/31/2021    CO2 30 12/31/2021    BUN 17 12/31/2021    LABALBU 4.0 12/31/2021    CREATININE 0.68 12/31/2021    CALCIUM 9.3 12/31/2021    LABGLOM 72 03/09/2021    GLUCOSE 107 12/31/2021       Lab Results   Component Value Date    ALKPHOS 90 12/31/2021    ALKPHOS 99 03/09/2021    ALT 22 12/31/2021    AST 19 12/31/2021    PROT 7.0 12/31/2021    BILITOT 0.4 12/31/2021    BILIDIR <0.2 06/23/2020    LABALBU 4.0 12/31/2021       Lab Results   Component Value Date    MG 1.8 04/13/2019       Lab Results   Component Value Date

## 2022-02-22 PROBLEM — I49.3 PVC (PREMATURE VENTRICULAR CONTRACTION): Status: ACTIVE | Noted: 2022-02-22

## 2022-02-22 NOTE — PROGRESS NOTES
[facility] PROFESSIONAL SERVICES  [facility] [address]   [address] [address]   Dept: [phone]   Dept Fax: [phone]   Loc: [phone]      Chief Complaint   Patient presents with    Follow-up     bp issues      Cardiologist:  Dr. Kareem Rodríguez  76 yo female present for 4 week f/u for BP issues. Dr Kareem Rodríguez recently adjusted meds. Hx of PVCs, abnormal stress, thyroid issues, HTN. No chest pain, angina, MONK, orthopnea, PND, sob at rest, LE edema, or syncope. Was Rx norvasc by Kareem Rodríguez 1 month ago. Never started until Monday when her BP was higher at home. Has been better since then. Today is 113/70 in office, well controlled. Patient's palpitations are much better since increasing toprol to 50 mg daily at last visit. No other symptoms. General:   No fever, no chills, no weight loss, no fatigue  Pulmonary:    No dyspnea, no wheezing  Cardiac:    Denies recent chest pain   GI:     No nausea or vomiting, no abdominal pain  Neuro:     No dizziness or light headedness  Musculoskeletal:  No recent active issues  Extremities:   No edema      Past Medical History:   Diagnosis Date    Acid reflux     Arthritis     osteo -   knees    Cancer ([facility]? ??    cervical   cryo surgery    Cancer ([facility]     skin-basal cell    Cataracts, bilateral     Constipation     Depression     Epilepsy (HCC)     Fibromyalgia     Hearing loss     Hiatal hernia     History of Luis-Barr virus infection     Hoarseness     Hypothyroidism     Mono exposure     Night sweats     Osteoarthritis     Sinus infection     SOB (shortness of breath)     UTI (urinary tract infection) 05/30/2018       No Known Allergies    Current Outpatient Medications   Medication Sig Dispense Refill    VITAMIN B12 TR 1000 MCG TBCR TAKE 1 TABLET BY MOUTH EVERY DAY      amLODIPine (NORVASC) 5 MG tablet Take 1 tablet by mouth daily 30 tablet 3    metoprolol succinate (TOPROL XL) 50 MG extended release tablet Take 1 tablet by mouth daily 30 tablet 1    vitamin E 400 UNIT capsule Take 400 Units by mouth daily      NONFORMULARY UTI clear 1 5 mL dropper full daily      Ascorbic Acid (JOELLE-C PO) Take by mouth      TURMERIC PO Take by mouth      Zinc Sulfate (ZINC 15 PO) Take 30 mg by mouth      L-LYSINE PO Take by mouth      D-MANNOSE PO Take 1,500 mg by mouth daily      Esomeprazole Magnesium 20 MG TBEC Take 1 tablet by mouth daily Pt taking as needed      MAGNESIUM PO Take 1 tablet by mouth every morning       Omega-3 Fatty Acids (FISH OIL PO) Take 1 capsule by mouth every morning       Potassium 99 MG TABS Take 1 tablet by mouth daily       loratadine (CLARITIN) 10 MG tablet Take 1 tablet by mouth daily (Patient taking differently: Take 10 mg by mouth as needed ) 30 tablet 0    Cholecalciferol (VITAMIN D3) 5000 UNITS TABS Take 1 tablet by mouth every other day       aspirin 81 MG chewable tablet Take 81 mg by mouth nightly       hyoscyamine (LEVSIN) 0.125 MG tablet Take 0.125 mg by mouth every 4 hours as needed for Cramping.  ARMOUR THYROID PO Take 45 mg by mouth See Admin Instructions M W F 60mg  Tues Thur Sat Sun 45mg      atorvastatin (LIPITOR) 40 MG tablet Take 1 tablet by mouth daily (Patient not taking: Reported on 2022) 30 tablet 3     No current facility-administered medications for this visit.        Social History     Socioeconomic History    Marital status:      Spouse name: None    Number of children: None    Years of education: None    Highest education level: None   Occupational History    None   Tobacco Use    Smoking status: Former Smoker     Packs/day: 0.50     Years: 29.00     Pack years: 14.50     Types: Cigarettes     Quit date: 2015     Years since quittin.7    Smokeless tobacco: Never Used    Tobacco comment: smoked for 13 years and quit and just start again about 2 years ago   Vaping Use    Vaping Use: Never used   Substance and Sexual Activity  Alcohol use: Yes     Alcohol/week: 2.0 standard drinks     Types: 2 Glasses of wine per week     Comment: occasional    Drug use: Yes     Types: Marijuana Veldon Bunting)     Comment: medical marijuana prn    Sexual activity: Never   Other Topics Concern    None   Social History Narrative    None     Social Determinants of Health     Financial Resource Strain:     Difficulty of Paying Living Expenses: Not on file   Food Insecurity:     Worried About Running Out of Food in the Last Year: Not on file    Alexander of Food in the Last Year: Not on file   Transportation Needs:     Lack of Transportation (Medical): Not on file    Lack of Transportation (Non-Medical):  Not on file   Physical Activity:     Days of Exercise per Week: Not on file    Minutes of Exercise per Session: Not on file   Stress:     Feeling of Stress : Not on file   Social Connections:     Frequency of Communication with Friends and Family: Not on file    Frequency of Social Gatherings with Friends and Family: Not on file    Attends Islam Services: Not on file    Active Member of [address] [place] [address] or Organizations: Not on file    Attends Club or Organization Meetings: Not on file    Marital Status: Not on file   Intimate Partner Violence:     Fear of Current or Ex-Partner: Not on file    Emotionally Abused: Not on file    Physically Abused: Not on file    Sexually Abused: Not on file   Housing Stability:     Unable to Pay for Housing in the Last Year: Not on file    Number of [place] in the Last Year: Not on file    Unstable Housing in the Last Year: Not on file       Family History   Problem Relation Age of Onset    Heart Disease Maternal Grandmother 52        MI    Heart Disease Maternal Grandfather     Stroke Maternal Grandfather 46    Diabetes Mother     Heart Disease Mother     Other Mother         complications from accident    Cancer Father         bone prostate    Diabetes Father     High Blood Pressure Brother     Cancer Maternal Aunt         possible colon    Cancer Paternal Grandfather     Breast Cancer Neg Hx        Blood pressure 113/70, pulse 71, height 5' 9\" (1.753 m), weight 247 lb (112 kg). General:   Well developed, well nourished  Lungs:   Clear to auscultation  Heart:    Normal S1 S2, No murmur, rubs, or gallops  Abdomen:   Soft, non tender, no organomegalies, positive bowel sounds  Extremities:   No edema, no cyanosis, good peripheral pulses  Neurological:   Awake, alert, oriented. No obvious focal deficits  Musculoskeletal:  No obvious deformities    EKG:          Diagnosis Orders   1. Essential hypertension     2. PVC (premature ventricular contraction)         No orders of the defined types were placed in this encounter. Assessment/Plan:   HTN- well controlled today. toprol 50 mg daily. norvasc 5 mg daily. Continue these. PVC/palpitations- have been much better since increasing toprol to 50 mg daily. No changes today. Patient just started taking norvasc Monday after her BP was noted to be high at home. Has been better since then. Disposition:   F/u with Arcelia as scheduled.    Continue Dr Rosalind Siegel current treatment plan  Follow up with Dr Jose Alfredo Santacruz as scheduled or sooner if needed

## 2022-02-23 ENCOUNTER — OFFICE VISIT (OUTPATIENT)
Dept: CARDIOLOGY CLINIC | Age: 67
End: 2022-02-23
Payer: MEDICARE

## 2022-02-23 VITALS
BODY MASS INDEX: 36.58 KG/M2 | HEART RATE: 71 BPM | SYSTOLIC BLOOD PRESSURE: 113 MMHG | DIASTOLIC BLOOD PRESSURE: 70 MMHG | HEIGHT: 69 IN | WEIGHT: 247 LBS

## 2022-02-23 DIAGNOSIS — I10 ESSENTIAL HYPERTENSION: Primary | ICD-10-CM

## 2022-02-23 DIAGNOSIS — I49.3 PVC (PREMATURE VENTRICULAR CONTRACTION): ICD-10-CM

## 2022-02-23 PROCEDURE — 1036F TOBACCO NON-USER: CPT | Performed by: STUDENT IN AN ORGANIZED HEALTH CARE EDUCATION/TRAINING PROGRAM

## 2022-02-23 PROCEDURE — 3017F COLORECTAL CA SCREEN DOC REV: CPT | Performed by: STUDENT IN AN ORGANIZED HEALTH CARE EDUCATION/TRAINING PROGRAM

## 2022-02-23 PROCEDURE — 99213 OFFICE O/P EST LOW 20 MIN: CPT | Performed by: STUDENT IN AN ORGANIZED HEALTH CARE EDUCATION/TRAINING PROGRAM

## 2022-02-23 PROCEDURE — 1090F PRES/ABSN URINE INCON ASSESS: CPT | Performed by: STUDENT IN AN ORGANIZED HEALTH CARE EDUCATION/TRAINING PROGRAM

## 2022-02-23 PROCEDURE — 1123F ACP DISCUSS/DSCN MKR DOCD: CPT | Performed by: STUDENT IN AN ORGANIZED HEALTH CARE EDUCATION/TRAINING PROGRAM

## 2022-02-23 PROCEDURE — G8427 DOCREV CUR MEDS BY ELIG CLIN: HCPCS | Performed by: STUDENT IN AN ORGANIZED HEALTH CARE EDUCATION/TRAINING PROGRAM

## 2022-02-23 PROCEDURE — 4040F PNEUMOC VAC/ADMIN/RCVD: CPT | Performed by: STUDENT IN AN ORGANIZED HEALTH CARE EDUCATION/TRAINING PROGRAM

## 2022-02-23 PROCEDURE — G8484 FLU IMMUNIZE NO ADMIN: HCPCS | Performed by: STUDENT IN AN ORGANIZED HEALTH CARE EDUCATION/TRAINING PROGRAM

## 2022-02-23 PROCEDURE — G8400 PT W/DXA NO RESULTS DOC: HCPCS | Performed by: STUDENT IN AN ORGANIZED HEALTH CARE EDUCATION/TRAINING PROGRAM

## 2022-02-23 PROCEDURE — G8417 CALC BMI ABV UP PARAM F/U: HCPCS | Performed by: STUDENT IN AN ORGANIZED HEALTH CARE EDUCATION/TRAINING PROGRAM

## 2022-02-23 RX ORDER — METOPROLOL SUCCINATE 50 MG/1
50 TABLET, EXTENDED RELEASE ORAL DAILY
Qty: 30 TABLET | Refills: 5 | Status: SHIPPED | OUTPATIENT
Start: 2022-02-23 | End: 2022-06-21

## 2022-06-25 ENCOUNTER — HOSPITAL ENCOUNTER (EMERGENCY)
Age: 67
Discharge: HOME | End: 2022-06-25
Attending: EMERGENCY MEDICINE
Payer: MEDICARE

## 2022-06-25 VITALS
HEIGHT: 69 IN | BODY MASS INDEX: 37.77 KG/M2 | WEIGHT: 255 LBS | OXYGEN SATURATION: 97 % | RESPIRATION RATE: 18 BRPM | DIASTOLIC BLOOD PRESSURE: 78 MMHG | TEMPERATURE: 97.6 F | HEART RATE: 58 BPM | SYSTOLIC BLOOD PRESSURE: 126 MMHG

## 2022-06-25 DIAGNOSIS — R51.9 ACUTE NONINTRACTABLE HEADACHE, UNSPECIFIED HEADACHE TYPE: Primary | ICD-10-CM

## 2022-06-25 LAB
ANION GAP SERPL CALCULATED.3IONS-SCNC: 10 MEQ/L (ref 8–16)
BACTERIA: ABNORMAL /HPF
BASOPHILS # BLD: 1.2 %
BASOPHILS ABSOLUTE: 0.1 THOU/MM3 (ref 0–0.1)
BILIRUBIN URINE: NEGATIVE
BLOOD, URINE: NEGATIVE
BUN BLDV-MCNC: 9 MG/DL (ref 7–22)
CALCIUM SERPL-MCNC: 9.6 MG/DL (ref 8.5–10.5)
CASTS 2: ABNORMAL /LPF
CASTS UA: ABNORMAL /LPF
CHARACTER, URINE: CLEAR
CHLORIDE BLD-SCNC: 107 MEQ/L (ref 98–111)
CO2: 26 MEQ/L (ref 23–33)
COLOR: ABNORMAL
CREAT SERPL-MCNC: 0.7 MG/DL (ref 0.4–1.2)
CRYSTALS, UA: ABNORMAL
EOSINOPHIL # BLD: 3.5 %
EOSINOPHILS ABSOLUTE: 0.2 THOU/MM3 (ref 0–0.4)
EPITHELIAL CELLS, UA: ABNORMAL /HPF
ERYTHROCYTE [DISTWIDTH] IN BLOOD BY AUTOMATED COUNT: 13.6 % (ref 11.5–14.5)
ERYTHROCYTE [DISTWIDTH] IN BLOOD BY AUTOMATED COUNT: 45.8 FL (ref 35–45)
GFR SERPL CREATININE-BSD FRML MDRD: 84 ML/MIN/1.73M2
GLUCOSE BLD-MCNC: 112 MG/DL (ref 70–108)
GLUCOSE URINE: NEGATIVE MG/DL
HCT VFR BLD CALC: 42.7 % (ref 37–47)
HEMOGLOBIN: 13.9 GM/DL (ref 12–16)
IMMATURE GRANS (ABS): 0.02 THOU/MM3 (ref 0–0.07)
IMMATURE GRANULOCYTES: 0.5 %
KETONES, URINE: NEGATIVE
LEUKOCYTE ESTERASE, URINE: ABNORMAL
LYMPHOCYTES # BLD: 34.7 %
LYMPHOCYTES ABSOLUTE: 1.5 THOU/MM3 (ref 1–4.8)
MCH RBC QN AUTO: 30.2 PG (ref 26–33)
MCHC RBC AUTO-ENTMCNC: 32.6 GM/DL (ref 32.2–35.5)
MCV RBC AUTO: 92.6 FL (ref 81–99)
MISCELLANEOUS 2: ABNORMAL
MONOCYTES # BLD: 8.2 %
MONOCYTES ABSOLUTE: 0.4 THOU/MM3 (ref 0.4–1.3)
NITRITE, URINE: NEGATIVE
NUCLEATED RED BLOOD CELLS: 0 /100 WBC
OSMOLALITY CALCULATION: 284.4 MOSMOL/KG (ref 275–300)
PH UA: 8 (ref 5–9)
PLATELET # BLD: 250 THOU/MM3 (ref 130–400)
PMV BLD AUTO: 9.6 FL (ref 9.4–12.4)
POTASSIUM REFLEX MAGNESIUM: 4.4 MEQ/L (ref 3.5–5.2)
PROTEIN UA: NEGATIVE
RBC # BLD: 4.61 MILL/MM3 (ref 4.2–5.4)
RBC URINE: ABNORMAL /HPF
RENAL EPITHELIAL, UA: ABNORMAL
SEG NEUTROPHILS: 51.9 %
SEGMENTED NEUTROPHILS ABSOLUTE COUNT: 2.2 THOU/MM3 (ref 1.8–7.7)
SODIUM BLD-SCNC: 143 MEQ/L (ref 135–145)
SPECIFIC GRAVITY, URINE: < 1.005 (ref 1–1.03)
UROBILINOGEN, URINE: 0.2 EU/DL (ref 0–1)
WBC # BLD: 4.3 THOU/MM3 (ref 4.8–10.8)
WBC UA: ABNORMAL /HPF
YEAST: ABNORMAL

## 2022-06-25 PROCEDURE — 80048 BASIC METABOLIC PNL TOTAL CA: CPT

## 2022-06-25 PROCEDURE — 99283 EMERGENCY DEPT VISIT LOW MDM: CPT

## 2022-06-25 PROCEDURE — 85025 COMPLETE CBC W/AUTO DIFF WBC: CPT

## 2022-06-25 PROCEDURE — 81001 URINALYSIS AUTO W/SCOPE: CPT

## 2022-06-25 PROCEDURE — 36415 COLL VENOUS BLD VENIPUNCTURE: CPT

## 2022-06-25 RX ORDER — ACETAMINOPHEN 500 MG
500 TABLET ORAL EVERY 8 HOURS PRN
Qty: 12 TABLET | Refills: 0 | Status: SHIPPED | OUTPATIENT
Start: 2022-06-25

## 2022-06-25 ASSESSMENT — ENCOUNTER SYMPTOMS
SINUS PAIN: 0
CHEST TIGHTNESS: 0
ABDOMINAL PAIN: 0
CONSTIPATION: 0
CHOKING: 0
BACK PAIN: 0
COUGH: 0
DIARRHEA: 0
SHORTNESS OF BREATH: 0
SORE THROAT: 0
WHEEZING: 0
APNEA: 0
RHINORRHEA: 0
NAUSEA: 1
VOMITING: 0
SINUS PRESSURE: 0

## 2022-06-25 ASSESSMENT — PAIN DESCRIPTION - LOCATION
LOCATION: HEAD
LOCATION: HEAD

## 2022-06-25 ASSESSMENT — PAIN - FUNCTIONAL ASSESSMENT
PAIN_FUNCTIONAL_ASSESSMENT: 0-10
PAIN_FUNCTIONAL_ASSESSMENT: 0-10

## 2022-06-25 ASSESSMENT — PAIN SCALES - GENERAL
PAINLEVEL_OUTOF10: 5
PAINLEVEL_OUTOF10: 5

## 2022-06-25 ASSESSMENT — PAIN DESCRIPTION - DESCRIPTORS: DESCRIPTORS: DISCOMFORT

## 2022-06-25 NOTE — ED PROVIDER NOTES

## 2022-06-25 NOTE — ED PROVIDER NOTES
[place] ENCOUNTER      Pt Name: Veronika Cevallos  MRN: [id number]  [place] 1955  Date of evaluation: 6/25/2022  Treating Resident Physician: Opal Andrews DO  Supervising Physician: Arianne Chaney MD    History obtained from the patient. CHIEF COMPLAINT       Chief Complaint   Patient presents with    Headache    Urinary Frequency     HISTORY OF PRESENT ILLNESS    HPI  Veronika Cevallos is a 77 y.o. female who presents to the emergency department for evaluation of headache onset 5-6 days ago. Patient describes headache to be generalized and all over head, deep throbbing. Rates it a 5/10. Believes she also has a UTI as she has this \"feeling of pressure\" down in her bladder and groin. She states she gets this sensation whenever she gets a UTI. Denies dysuria. The patient has no other acute complaints at this time. Denies vomiting, diarrhea, numbness, tingling, fevers, chills. REVIEW OF SYSTEMS   Review of Systems   Constitutional: Negative for activity change, appetite change, chills, diaphoresis, fatigue and fever. HENT: Negative for congestion, postnasal drip, rhinorrhea, sinus pressure, sinus pain, sneezing and sore throat. Respiratory: Negative for apnea, cough, choking, chest tightness, shortness of breath and wheezing. Cardiovascular: Negative for chest pain and palpitations. Gastrointestinal: Positive for nausea. Negative for abdominal pain, constipation, diarrhea and vomiting. Endocrine: Negative for cold intolerance. Genitourinary: Positive for frequency. Negative for decreased urine volume, difficulty urinating, dyspareunia, dysuria, enuresis, flank pain, genital sores, hematuria, menstrual problem, pelvic pain and urgency. Musculoskeletal: Negative for arthralgias, back pain, gait problem, joint swelling and myalgias. Skin: Negative for pallor, rash and wound. Neurological: Positive for tremors and headaches.  Negative for dizziness, seizures, syncope, weakness, light-headedness and numbness. Psychiatric/Behavioral: Negative for agitation, behavioral problems, confusion and self-injury. The patient is not nervous/anxious. PAST MEDICAL AND SURGICAL HISTORY     Past Medical History:   Diagnosis Date    Acid reflux     Arthritis     osteo -   knees    Cancer ([facility] 1985? ??    cervical   cryo surgery    Cancer ([facility]     skin-basal cell    Cataracts, bilateral     Constipation     Depression     Epilepsy ([facility]     Fibromyalgia     Hearing loss     Hiatal hernia     History of Luis-Barr virus infection     Hoarseness     Hypothyroidism     Mono exposure     Night sweats     Osteoarthritis     Sinus infection     SOB (shortness of breath)     UTI (urinary tract infection) 05/30/2018     Past Surgical History:   Procedure Laterality Date    CARDIOVASCULAR STRESS TEST  03-14-11    No evidence of ischemia is noted.  CARPAL TUNNEL RELEASE Right     CHOLECYSTECTOMY  2013    COLONOSCOPY      last one 2014    COLONOSCOPY  11/2016    DOPPLER ECHOCARDIOGRAPHY  03-18-11    EF 55-65%    EKG 12 LEAD (HISTORICAL)  04/2017    FRACTURE SURGERY  2000??    right shoulder    HYSTERECTOMY (CERVIX STATUS UNKNOWN)      2001??    OTHER SURGICAL HISTORY  29 OCT 2014    Cystoscopy (Dr. Saji Vazquez, [facility])   [facility] SINUS SURGERY  06/2015    TONSILLECTOMY      as a teenager     MEDICATIONS   No current facility-administered medications for this encounter.     Current Outpatient Medications:     acetaminophen (TYLENOL) 500 MG tablet, Take 1 tablet by mouth every 8 hours as needed for Pain, Disp: 12 tablet, Rfl: 0    metoprolol succinate (TOPROL XL) 25 MG extended release tablet, TAKE 1 TABLET BY MOUTH EVERY DAY, Disp: , Rfl:     NP THYROID 30 MG tablet, TAKE 2 TABS BY MOUTH DAILY ON MONDAY, WEDNESDAY, & FRIDAY AND 1 & 1/2 TABS DAILY ON TUESDAY, THURSDAY, SATURDAY, & SUNDAY, Disp: , Rfl:     esomeprazole ([address] [employer]) 40 MG delayed release capsule, TAKE 1 CAPSULE BY MOUTH IN THE MORNING, Disp: , Rfl:     VITAMIN B12 TR 1000 MCG TBCR, TAKE 1 TABLET BY MOUTH EVERY DAY, Disp: , Rfl:     vitamin E 400 UNIT capsule, Take 400 Units by mouth daily, Disp: , Rfl:     NONFORMULARY, UTI clear 1 5 mL dropper full daily, Disp: , Rfl:     Ascorbic Acid (JOELLE-C PO), Take by mouth, Disp: , Rfl:     TURMERIC PO, Take by mouth, Disp: , Rfl:     Zinc Sulfate (ZINC 15 PO), Take 30 mg by mouth, Disp: , Rfl:     L-LYSINE PO, Take by mouth, Disp: , Rfl:     D-MANNOSE PO, Take 1,500 mg by mouth daily, Disp: , Rfl:     Esomeprazole Magnesium 20 MG TBEC, Take 1 tablet by mouth daily Pt taking as needed, Disp: , Rfl:     MAGNESIUM PO, Take 1 tablet by mouth every morning , Disp: , Rfl:     Omega-3 Fatty Acids (FISH OIL PO), Take 1 capsule by mouth every morning , Disp: , Rfl:     Potassium 99 MG TABS, Take 1 tablet by mouth daily , Disp: , Rfl:     loratadine (CLARITIN) 10 MG tablet, Take 1 tablet by mouth daily (Patient taking differently: Take 10 mg by mouth as needed ), Disp: 30 tablet, Rfl: 0    Cholecalciferol (VITAMIN D3) 5000 UNITS TABS, Take 1 tablet by mouth every other day , Disp: , Rfl:     aspirin 81 MG chewable tablet, Take 81 mg by mouth nightly , Disp: , Rfl:     hyoscyamine (LEVSIN) 0.125 MG tablet, Take 0.125 mg by mouth every 4 hours as needed for Cramping., Disp: , Rfl:     ARMOUR THYROID PO, Take 45 mg by mouth See Admin Instructions M W F 60mg Tues Thur Sat Sun 45mg, Disp: , Rfl:     SOCIAL HISTORY     Social History     Social History Narrative    Not on file     Social History     Tobacco Use    Smoking status: Former Smoker     Packs/day: 0.50     Years: 29.00     Pack years: 14.50     Types: Cigarettes     Quit date: 2015     Years since quittin.0    Smokeless tobacco: Never Used    Tobacco comment: smoked for 13 years and quit and just start again about 2 years ago   Vaping Use    Vaping Use: Never used   Substance Use Topics    Alcohol use: Yes     Alcohol/week: 2.0 standard drinks     Types: 2 Glasses of wine per week     Comment: occasional    Drug use: Yes     Types: Marijuana Abelino Bilberry)     Comment: medical marijuana prn     ALLERGIES   No Known Allergies    FAMILY HISTORY     Family History   Problem Relation Age of Onset    Heart Disease Maternal Grandmother 52        MI    Heart Disease Maternal Grandfather     Stroke Maternal Grandfather 46    Diabetes Mother     Heart Disease Mother     Other Mother         complications from accident    Cancer Father         bone prostate    Diabetes Father     High Blood Pressure Brother     Cancer Maternal Aunt         possible colon    Cancer Paternal Grandfather     Breast Cancer Neg Hx      PREVIOUS RECORDS   Previous records reviewed    PHYSICAL EXAM     ED Triage Vitals [06/25/22 0828]   BP Temp Temp Source Heart Rate Resp SpO2 Height Weight   (!) 147/79 97.6 °F (36.4 °C) Oral 66 18 96 % 5' 8.5\" (1.74 m) 255 lb (115.7 kg)     Initial vital signs and nursing assessment reviewed and normal. Body mass index is 38.21 kg/m². Pulsoximetry is normal per my interpretation. Additional Vital Signs:  Vitals:    06/25/22 0912   BP: 126/78   Pulse: 58   Resp: 18   Temp:    SpO2: 97%     Physical Exam  Constitutional:       General: She is not in acute distress. Appearance: She is obese. She is not ill-appearing or toxic-appearing. HENT:      Head: Normocephalic and atraumatic. Right Ear: External ear normal.      Left Ear: External ear normal.      Nose: Nose normal.      Mouth/Throat:      Pharynx: No oropharyngeal exudate. Eyes:      Extraocular Movements: Extraocular movements intact. Cardiovascular:      Rate and Rhythm: Normal rate and regular rhythm. Pulses: Normal pulses. Heart sounds: Normal heart sounds. No murmur heard. No friction rub. No gallop.     Pulmonary:      Effort: Pulmonary effort is normal. No respiratory distress. Breath sounds: Normal breath sounds. No stridor. No wheezing, rhonchi or rales. Abdominal:      General: Bowel sounds are normal. There is no distension. Palpations: There is no mass. Tenderness: There is no abdominal tenderness. There is no right CVA tenderness, left CVA tenderness or guarding. Hernia: No hernia is present. Musculoskeletal:         General: No swelling, tenderness, deformity or signs of injury. Normal range of motion. Skin:     Coloration: Skin is pale. Skin is not jaundiced. Findings: No bruising or lesion. Neurological:      General: No focal deficit present. Mental Status: She is alert and oriented to person, place, and time. Mental status is at baseline. Cranial Nerves: No cranial nerve deficit. Sensory: No sensory deficit. Motor: No weakness. Gait: Gait normal.   Psychiatric:         Mood and Affect: Mood normal.       MEDICAL DECISION MAKING   Initial Assessment:   1. Generalized headache, unspecified type  2.  Possible UTI    Plan:    Will check UA, CBC, BMP   Medically stable for discharge to home    Continue tylenol & Ibuprofen for symptomatic relief     ED RESULTS   Laboratory results:  Labs Reviewed   CBC WITH AUTO DIFFERENTIAL - Abnormal; Notable for the following components:       Result Value    WBC 4.3 (*)     RDW-SD 45.8 (*)     All other components within normal limits   BASIC METABOLIC PANEL W/ REFLEX TO MG FOR LOW K - Abnormal; Notable for the following components:    Glucose 112 (*)     All other components within normal limits   GLOMERULAR FILTRATION RATE, ESTIMATED - Abnormal; Notable for the following components:    Est, Glom Filt Rate 84 (*)     All other components within normal limits   URINE WITH REFLEXED MICRO - Abnormal; Notable for the following components:    Leukocyte Esterase, Urine TRACE (*)     All other components within normal limits   ANION GAP   OSMOLALITY     Radiologic studies results:  No orders to display     ED Medications administered this visit: Medications - No data to display    ED COURSE      Strict return precautions and follow up instructions were discussed with the patient prior to discharge, with which the patient agrees. MEDICATION CHANGES     New Prescriptions    ACETAMINOPHEN (TYLENOL) 500 MG TABLET    Take 1 tablet by mouth every 8 hours as needed for Pain     FINAL DISPOSITION     Final diagnoses:   Acute nonintractable headache, unspecified headache type     Condition: condition: stable  Dispo: Discharge to home      This transcription was electronically signed. Parts of this transcriptions may have been dictated by use of voice recognition software and electronically transcribed, and parts may have been transcribed with the assistance of an ED scribe. The transcription may contain errors not detected in proofreading. Please refer to my supervising physician's documentation if my documentation differs.     Electronically Signed: Saloni Mcdaniel DO, 06/25/22, 9:59 AM       Saloni Mcdaniel DO  Resident  06/25/22 1000

## 2022-06-25 NOTE — ED TRIAGE NOTES
Pt presents to the ED for a headache x5 days. Pt states she has taken tylenol with some relief. Pt states she believes she has had a fever but was unable to take her temp at home. Pt states she also believes she has a UTI due to having urinary frequency.

## 2022-07-20 ENCOUNTER — HOSPITAL ENCOUNTER (OUTPATIENT)
Dept: ULTRASOUND IMAGING | Age: 67
Discharge: HOME | End: 2022-07-20
Payer: MEDICARE

## 2022-07-20 DIAGNOSIS — N28.1 RENAL CYST: ICD-10-CM

## 2022-07-20 DIAGNOSIS — N28.1 RENAL CYST: Primary | ICD-10-CM

## 2022-07-20 PROCEDURE — 76770 US EXAM ABDO BACK WALL COMP: CPT

## 2022-10-04 ENCOUNTER — SCHEDULED TELEPHONE ENCOUNTER (OUTPATIENT)
Dept: UROLOGY | Age: 67
End: 2022-10-04
Payer: MEDICARE

## 2022-10-04 DIAGNOSIS — N28.1 RENAL CYST, ACQUIRED, LEFT: Primary | ICD-10-CM

## 2022-10-04 PROCEDURE — 99442 PR PHYS/QHP TELEPHONE EVALUATION 11-20 MIN: CPT | Performed by: PHYSICIAN ASSISTANT

## 2022-10-07 NOTE — PROGRESS NOTES
[id number] [facility] [address]  Dept: [phone]  Loc: [phone]      TeleHealth Evaluation: Audio     Participants:  Patient: Dorothea Gomez (Home)  Provider: Jo-Ann Uribe PA-C (office)      Ms. Patrick Babin was seen in follow up for   Chief Complaint   Patient presents with    Follow-up    Cyst     renal        HPI:  Ms. Patrick Babin is a 61-year-old female with a history of a left renal mass. She has undergone imaging for the last several months for a 1.7 cm Bosniak 2 cyst located off the mid-pole of the left kidney with peripheral enhancement and a 2.4 cm subcapsular lesion along the posterior aspect of the right hepatic lobe was noted, consistent with a hemangioma. She reports mild urgency and frequency. She reports increasing urinary tract infections when she restarts PPI therapy. She denies current  flank/abdominal/pelvic pain, dysuria, or gross hematuria. She reports a weakened stream and does not feel she is emptying her bladder completely. In regards to her general medical health, she reports acid reflux and constipation. She is following with a gastroenterologist. She denies dyspnea, chest pain/pressure, N/V, leg pain, or lightheadedness. Past Medical History:   Diagnosis Date    Acid reflux     Arthritis     osteo -   knees    Cancer ([address] 1985? ??    cervical   cryo surgery    Cancer ([address]     skin-basal cell    Cataracts, bilateral     Constipation     Depression     Epilepsy ([address]     Fibromyalgia     Hearing loss     Hiatal hernia     History of Luis-Barr virus infection     Hoarseness     Hypothyroidism     Mono exposure     Night sweats     Osteoarthritis     Sinus infection     SOB (shortness of breath)     UTI (urinary tract infection) 05/30/2018       Past Surgical History:   Procedure Laterality Date    CARDIAC CATHETERIZATION  2021    no stents    CARDIOVASCULAR STRESS TEST  03/14/2011    No evidence of ischemia is noted.     CARPAL TUNNEL RELEASE Right     CHOLECYSTECTOMY  2013    COLONOSCOPY      last one 2014    COLONOSCOPY  11/2016    DOPPLER ECHOCARDIOGRAPHY  03/18/2011    EF 55-65%    EKG 12 LEAD (HISTORICAL)  04/2017    FRACTURE SURGERY  2000??    right shoulder    HYSTERECTOMY (CERVIX STATUS UNKNOWN)      2001??    OTHER SURGICAL HISTORY  10/29/2014    Cystoscopy (Dr. Geraldo Weinstein, [facility])    [address]  06/2015    TONSILLECTOMY      as a teenager       Current Outpatient Medications on File Prior to Visit   Medication Sig Dispense Refill    acetaminophen (TYLENOL) 500 MG tablet Take 1 tablet by mouth every 8 hours as needed for Pain 12 tablet 0    metoprolol succinate (TOPROL XL) 25 MG extended release tablet TAKE 1 TABLET BY MOUTH EVERY DAY      NP THYROID 30 MG tablet TAKE 2 TABS BY MOUTH DAILY ON MONDAY, WEDNESDAY, & FRIDAY AND 1 & 1/2 TABS DAILY ON TUESDAY, THURSDAY, SATURDAY, & SUNDAY      esomeprazole (NEXIUM) 40 MG delayed release capsule TAKE 1 CAPSULE BY MOUTH IN THE MORNING      VITAMIN B12 TR 1000 MCG TBCR TAKE 1 TABLET BY MOUTH EVERY DAY      vitamin E 400 UNIT capsule Take 400 Units by mouth daily      NONFORMULARY UTI clear 1 5 mL dropper full daily      Ascorbic Acid (JOELLE-C PO) Take by mouth      TURMERIC PO Take by mouth      Zinc Sulfate (ZINC 15 PO) Take 30 mg by mouth      L-LYSINE PO Take by mouth      D-MANNOSE PO Take 1,500 mg by mouth daily      Esomeprazole Magnesium 20 MG TBEC Take 1 tablet by mouth daily Pt taking as needed      MAGNESIUM PO Take 1 tablet by mouth every morning       Omega-3 Fatty Acids (FISH OIL PO) Take 1 capsule by mouth every morning       Potassium 99 MG TABS Take 1 tablet by mouth daily       loratadine (CLARITIN) 10 MG tablet Take 1 tablet by mouth daily (Patient taking differently: Take 10 mg by mouth as needed ) 30 tablet 0    Cholecalciferol (VITAMIN D3) 5000 UNITS TABS Take 1 tablet by mouth every other day       aspirin 81 MG chewable tablet Take 81 mg by mouth nightly       hyoscyamine (LEVSIN) 0.125 MG tablet Take 0.125 mg by mouth every 4 hours as needed for Cramping. ARMOUR THYROID PO Take 45 mg by mouth See Admin Instructions M W F 60mg  Tues Thur Sat Sun 45mg       No current facility-administered medications on file prior to visit. No Known Allergies    Family History   Problem Relation Age of Onset    Heart Disease Maternal Grandmother 52        MI    Heart Disease Maternal Grandfather     Stroke Maternal Grandfather 46    Diabetes Mother     Heart Disease Mother     Other Mother         complications from accident    Cancer Father         bone prostate    Diabetes Father     High Blood Pressure Brother     Cancer Maternal Aunt         possible colon    Cancer Paternal Grandfather     Breast Cancer Neg Hx        Social History     Socioeconomic History    Marital status:      Spouse name: Not on file    Number of children: Not on file    Years of education: Not on file    Highest education level: Not on file   Occupational History    Not on file   Tobacco Use    Smoking status: Former     Packs/day: 0.50     Years: 29.00     Pack years: 14.50     Types: Cigarettes     Quit date: 2015     Years since quittin.3    Smokeless tobacco: Never    Tobacco comments:     smoked for 13 years and quit and just start again about 2 years ago   Vaping Use    Vaping Use: Never used   Substance and Sexual Activity    Alcohol use:  Yes     Alcohol/week: 2.0 standard drinks     Types: 2 Glasses of wine per week     Comment: occasional    Drug use: Yes     Types: Marijuana Alfornia Cashing)     Comment: medical marijuana prn    Sexual activity: Never   Other Topics Concern    Not on file   Social History Narrative    Not on file     Social Determinants of Health     Financial Resource Strain: Not on file   Food Insecurity: Not on file   Transportation Needs: Not on file   Physical Activity: Not on file   Stress: Not on file   Social Connections: Not on file   Intimate Partner Violence: Not on file   Housing Stability: Not on file       Review of Systems  Constitutional: Negative for fatigue, fever and unexpected weight change. HENT: Negative for congestion and trouble swallowing. Eyes: Negative for pain and itching. Respiratory: Negative for cough and shortness of breath. Cardiovascular: Negative for chest pain and leg swelling. Gastrointestinal: Negative for abdominal pain, constipation, diarrhea and nausea. Endocrine: Negative for cold intolerance and heat intolerance. Genitourinary: See HPI. Musculoskeletal: Positive for lower back pain,. Negative for joint swelling. Skin: Negative for rash. Neurological: Negative for dizziness, weakness, numbness and headaches. Psychiatric/Behavioral: The patient is not nervous/anxious. Exam  There were no vitals taken for this visit. Constitutional: Oriented to person, place, and time. Cooperative. Pulmonary/Chest: No respiratory distress. No audible wheezes. Neurological: Alert and oriented to person, place, and time. Psychiatric: Normal mood and affect. Behavior is normal.   Nursing note and vitals reviewed. Labs    No results found for this visit on 10/04/22. Lab Results   Component Value Date    CREATININE 0.7 06/25/2022    BUN 9 06/25/2022     06/25/2022    K 4.4 06/25/2022     06/25/2022    CO2 26 06/25/2022     Renal US: (July 2022)  FINDINGS: The right kidney measures 10.8 x 5.0 x 5.2 cm and the left kidney measures 10.9 x 5.6 x 5.6 cm. Renal cortical thickness is normal bilaterally. An avascular hypoechoic structure at the upper left kidney measures 1.6 cm. An echogenic structure    at the mid left kidney measures 1.0 cm. There is no hydronephrosis on either side. Color Doppler demonstrates expected wave forms in the bilateral renal arteries. Arcuate resistive indices are normal bilaterally.        The distended urinary bladder is unremarkable. There is a large amount of postvoid residual urine in the bladder with a post void volume of 240 mL. Impression   1. Probable left renal cyst.   2. Left-sided nephrolithiasis. 3. Large amount of postvoid residual urine in an otherwise unremarkable urinary bladder. Assessment/Plan:    1. Left renal cyst- Stable 1.6 cm Bosniak 2 cyst located posteriorly off the mid-pole left kidney. Upon review of past imaging, this mass has been stable in size for > 2 years. Will follow with a renal US in one year. 2. Liver Hemangioma/LIver Cysts- Stable 2.5 cm lesion in the right hepatic lobe, consistent with a hemangioma. Last MRI in July 2021. Following with GI.      3. Recurrent UTI- On D-Mannose daily. Consider Hiprex if infections increase in frequency. Will obtain urine culture at upcoming nurse visit. 4. Urinary Retention/ Urinary Urgency/Frequency- Symptomatic. Post void residual of 240 ml noted on her July 2022 [address]. Patient to return in three weeks for PVR and to learn self catheterization. 5. Nephrolithiasis- Asymptomatic. 4 mm non-obstructing mid-pole left kidney stones noted on CT 2019. 2022 Renal US reads stone a 1 cm. Consider KUB at next visit. Melven Shone is a 79 y.o. female evaluated via telephone on 10/4/2022 for Follow-up and Cyst (renal)  . Documentation:  I communicated with the patient and/or health care decision maker about See A/P. Details of this discussion including any medical advice provided: See A/P. Total Time: minutes: 11-20 minutes    Melven Shone was evaluated through a synchronous (real-time) audio encounter. Patient identification was verified at the start of the visit. She (or guardian if applicable) is aware that this is a billable service, which includes applicable co-pays. This visit was conducted with the patient's (and/or legal guardian's) verbal consent.  She has not had a related appointment within my department in the past 7 days or scheduled within the next 24 hours. The patient was located at Home: [address] [place] [address]. The provider was located at [address]): [address] [place] [address] Suze VALLEJO II.[facility],  [address].     Note: not billable if this call serves to triage the patient into an appointment for the relevant concern    Mika Eli PA-C

## 2022-12-08 ENCOUNTER — HOSPITAL ENCOUNTER (EMERGENCY)
Age: 67
Discharge: HOME | End: 2022-12-08
Payer: MEDICARE

## 2022-12-08 VITALS
WEIGHT: 262 LBS | TEMPERATURE: 98.4 F | RESPIRATION RATE: 18 BRPM | OXYGEN SATURATION: 96 % | HEART RATE: 71 BPM | HEIGHT: 68 IN | SYSTOLIC BLOOD PRESSURE: 160 MMHG | DIASTOLIC BLOOD PRESSURE: 75 MMHG | BODY MASS INDEX: 39.71 KG/M2

## 2022-12-08 DIAGNOSIS — J40 BRONCHITIS: Primary | ICD-10-CM

## 2022-12-08 PROCEDURE — 99213 OFFICE O/P EST LOW 20 MIN: CPT

## 2022-12-08 PROCEDURE — 99213 OFFICE O/P EST LOW 20 MIN: CPT | Performed by: EMERGENCY MEDICINE

## 2022-12-08 RX ORDER — GUAIFENESIN 600 MG/1
1200 TABLET, EXTENDED RELEASE ORAL 2 TIMES DAILY
Qty: 40 TABLET | Refills: 0 | Status: SHIPPED | OUTPATIENT
Start: 2022-12-08 | End: 2022-12-18

## 2022-12-08 RX ORDER — ALBUTEROL SULFATE 90 UG/1
2 AEROSOL, METERED RESPIRATORY (INHALATION) 4 TIMES DAILY PRN
Qty: 18 G | Refills: 0 | Status: SHIPPED | OUTPATIENT
Start: 2022-12-08

## 2022-12-08 RX ORDER — PREDNISONE 20 MG/1
40 TABLET ORAL DAILY
Qty: 10 TABLET | Refills: 0 | Status: SHIPPED | OUTPATIENT
Start: 2022-12-08 | End: 2022-12-13

## 2022-12-08 ASSESSMENT — ENCOUNTER SYMPTOMS
SHORTNESS OF BREATH: 0
CHEST TIGHTNESS: 1
RHINORRHEA: 1
WHEEZING: 1
COUGH: 1

## 2022-12-08 NOTE — DISCHARGE INSTRUCTIONS
Drink plenty of fluid    Prednisone daily as directed. Guaifenesin as directed as needed for cough. Albuterol every 6 hours as needed for wheezing    Follow-up family physician or return here if no improvement 3 to 5 days.   Return sooner for worsening cough, chest pain, shortness of breath, fever or any new concerns

## 2022-12-08 NOTE — ED PROVIDER NOTES
(06/2015); Colonoscopy (11/2016); ekg 12 lead (historical) (04/2017); Carpal tunnel release (Right); and Cardiac catheterization (2021).     CURRENT MEDICATIONS       Discharge Medication List as of 12/8/2022  8:41 AM        CONTINUE these medications which have NOT CHANGED    Details   !! NP THYROID 30 MG tablet TAKE 2 TABS BY MOUTH DAILY ON MONDAY, WEDNESDAY, & FRIDAY AND 1 & 1/2 TABS DAILY ON TUESDAY, THURSDAY, SATURDAY, & SUNDAY, Disp-60 tablet, R-3, DAWPrint      acetaminophen (TYLENOL) 500 MG tablet Take 1 tablet by mouth every 8 hours as needed for Pain, Disp-12 tablet, R-0Normal      metoprolol succinate (TOPROL XL) 25 MG extended release tablet TAKE 1 TABLET BY MOUTH EVERY DAYHistorical Med      esomeprazole (NEXIUM) 40 MG delayed release capsule TAKE 1 CAPSULE BY MOUTH IN THE MORNINGHistorical Med      VITAMIN B12 TR 1000 MCG TBCR TAKE 1 TABLET BY MOUTH EVERY DAY, DAWHistorical Med      vitamin E 400 UNIT capsule Take 400 Units by mouth dailyHistorical Med      NONFORMULARY UTI clear 1 5 mL dropper full dailyHistorical Med      Ascorbic Acid (JOELLE-C PO) Take by mouthHistorical Med      TURMERIC PO Take by mouthHistorical Med      Zinc Sulfate (ZINC 15 PO) Take 30 mg by mouthHistorical Med      L-LYSINE PO Take by mouthHistorical Med      D-MANNOSE PO Take 1,500 mg by mouth dailyHistorical Med      Esomeprazole Magnesium 20 MG TBEC Take 1 tablet by mouth daily Pt taking as neededHistorical Med      MAGNESIUM PO Take 1 tablet by mouth every morning Historical Med      Omega-3 Fatty Acids (FISH OIL PO) Take 1 capsule by mouth every morning Historical Med      Potassium 99 MG TABS Take 1 tablet by mouth daily Historical Med      loratadine (CLARITIN) 10 MG tablet Take 1 tablet by mouth daily, Disp-30 tablet, R-0Print      Cholecalciferol (VITAMIN D3) 5000 UNITS TABS Take 1 tablet by mouth every other day Historical Med      aspirin 81 MG chewable tablet Take 81 mg by mouth nightly Historical Med hyoscyamine (LEVSIN) 0.125 MG tablet Take 0.125 mg by mouth every 4 hours as needed for Cramping. Historical Med      !! ARMOUR THYROID PO Take 45 mg by mouth See Admin Instructions DANNY HURD 60mg  Jeanine Stanford 45mgHistorical Med       !! - Potential duplicate medications found. Please discuss with provider. ALLERGIES     Patient is has No Known Allergies. Patients   There is no immunization history on file for this patient. FAMILY HISTORY     Patient's family history includes Cancer in her father, maternal aunt, and paternal grandfather; Diabetes in her father and mother; Heart Disease in her maternal grandfather and mother; Heart Disease (age of onset: 52) in her maternal grandmother; High Blood Pressure in her brother; Other in her mother; Stroke (age of onset: 46) in her maternal grandfather. SOCIAL HISTORY     Patient  reports that she quit smoking about 7 years ago. Her smoking use included cigarettes. She has a 14.50 pack-year smoking history. She has never used smokeless tobacco. She reports current alcohol use of about 2.0 standard drinks per week. She reports current drug use. Drug: Marijuana Maldonado Fogo). PHYSICAL EXAM     ED TRIAGE VITALS  BP: (!) 160/75, Temp: 98.4 °F (36.9 °C), Heart Rate: 71, Resp: 18, SpO2: 96 %,Estimated body mass index is 39.84 kg/m² as calculated from the following:    Height as of this encounter: 5' 8\" (1.727 m). Weight as of this encounter: 262 lb (118.8 kg). ,No LMP recorded. Patient has had a hysterectomy. Physical Exam  Constitutional:       Appearance: She is not ill-appearing. HENT:      Nose: Congestion and rhinorrhea present. Mouth/Throat:      Mouth: Mucous membranes are moist.      Pharynx: Oropharynx is clear. Cardiovascular:      Rate and Rhythm: Normal rate and regular rhythm. Pulses: Normal pulses. Heart sounds: Normal heart sounds. Pulmonary:      Effort: Pulmonary effort is normal.      Breath sounds: Wheezing present. Skin:     General: Skin is warm and dry. Neurological:      General: No focal deficit present. Mental Status: She is alert. Psychiatric:         Mood and Affect: Mood normal.         Behavior: Behavior normal.       DIAGNOSTIC RESULTS     Labs:No results found for this visit on 12/08/22. IMAGING:    No orders to display         EKG:      URGENT CARE COURSE:     Vitals:    12/08/22 0818   BP: (!) 160/75   Pulse: 71   Resp: 18   Temp: 98.4 °F (36.9 °C)   SpO2: 96%   Weight: 262 lb (118.8 kg)   Height: 5' 8\" (1.727 m)       Medications - No data to display         PROCEDURES:  None    FINAL IMPRESSION      1. Bronchitis          DISPOSITION/ PLAN     Patient presentsfor what is likely bronchitis. Symptoms x2-weeks. She is afebrile. O2 saturations are 96%. She has scattered expiratory wheezes. Patient will be discharged with prescriptions for prednisone, albuterol and guaifenesin. She is advised drink plenty of fluids. Follow-up with her family physician if no improvement in 5 days. Return sooner for worsening cough, chest pain, shortness of breath, fever or any new concerns.         PATIENT REFERRED TO:  LIS Grissom CNP  [id number] [id number]      DISCHARGE MEDICATIONS:  Discharge Medication List as of 12/8/2022  8:41 AM        START taking these medications    Details   predniSONE (DELTASONE) 20 MG tablet Take 2 tablets by mouth daily for 5 days, Disp-10 tablet, R-0Normal      guaiFENesin (MUCINEX) 600 MG extended release tablet Take 2 tablets by mouth 2 times daily for 10 days, Disp-40 tablet, R-0Normal      albuterol sulfate HFA (VENTOLIN HFA) 108 (90 Base) MCG/ACT inhaler Inhale 2 puffs into the lungs 4 times daily as needed for Wheezing, Disp-18 g, R-0Normal             Discharge Medication List as of 12/8/2022  8:41 AM          Discharge Medication List as of 12/8/2022  8:41 AM          LIS Francisco CNP    (Please note that portions of this note were completed with a voice recognition program. Efforts were made to edit the dictations but occasionally words are mis-transcribed.)           LIS Gill - SUSAN  12/08/22 0645

## 2023-02-23 ENCOUNTER — HOSPITAL ENCOUNTER (EMERGENCY)
Age: 68
Discharge: HOME | End: 2023-02-23
Attending: EMERGENCY MEDICINE
Payer: MEDICARE

## 2023-02-23 ENCOUNTER — APPOINTMENT (OUTPATIENT)
Dept: GENERAL RADIOLOGY | Age: 68
End: 2023-02-23
Payer: MEDICARE

## 2023-02-23 ENCOUNTER — APPOINTMENT (OUTPATIENT)
Dept: CT IMAGING | Age: 68
End: 2023-02-23
Payer: MEDICARE

## 2023-02-23 VITALS
OXYGEN SATURATION: 96 % | DIASTOLIC BLOOD PRESSURE: 66 MMHG | HEART RATE: 76 BPM | SYSTOLIC BLOOD PRESSURE: 123 MMHG | TEMPERATURE: 98 F | RESPIRATION RATE: 16 BRPM

## 2023-02-23 DIAGNOSIS — R51.9 ACUTE NONINTRACTABLE HEADACHE, UNSPECIFIED HEADACHE TYPE: Primary | ICD-10-CM

## 2023-02-23 LAB
ALBUMIN SERPL BCG-MCNC: 4.2 G/DL (ref 3.5–5.1)
ALP SERPL-CCNC: 96 U/L (ref 38–126)
ALT SERPL W/O P-5'-P-CCNC: 20 U/L (ref 11–66)
ANION GAP SERPL CALC-SCNC: 13 MEQ/L (ref 8–16)
AST SERPL-CCNC: 20 U/L (ref 5–40)
BASOPHILS ABSOLUTE: 0.1 THOU/MM3 (ref 0–0.1)
BASOPHILS NFR BLD AUTO: 0.8 %
BILIRUB SERPL-MCNC: 0.5 MG/DL (ref 0.3–1.2)
BUN SERPL-MCNC: 21 MG/DL (ref 7–22)
CALCIUM SERPL-MCNC: 9.6 MG/DL (ref 8.5–10.5)
CHLORIDE SERPL-SCNC: 103 MEQ/L (ref 98–111)
CO2 SERPL-SCNC: 23 MEQ/L (ref 23–33)
CREAT SERPL-MCNC: 0.8 MG/DL (ref 0.4–1.2)
DEPRECATED RDW RBC AUTO: 47.1 FL (ref 35–45)
EKG ATRIAL RATE: 85 BPM
EKG P AXIS: 72 DEGREES
EKG P-R INTERVAL: 192 MS
EKG Q-T INTERVAL: 366 MS
EKG QRS DURATION: 96 MS
EKG QTC CALCULATION (BAZETT): 435 MS
EKG R AXIS: -44 DEGREES
EKG T AXIS: 46 DEGREES
EKG VENTRICULAR RATE: 85 BPM
EOSINOPHIL NFR BLD AUTO: 3 %
EOSINOPHILS ABSOLUTE: 0.2 THOU/MM3 (ref 0–0.4)
ERYTHROCYTE [DISTWIDTH] IN BLOOD BY AUTOMATED COUNT: 13.9 % (ref 11.5–14.5)
FLUAV RNA RESP QL NAA+PROBE: NOT DETECTED
FLUBV RNA RESP QL NAA+PROBE: NOT DETECTED
GFR SERPL CREATININE-BSD FRML MDRD: > 60 ML/MIN/1.73M2
GLUCOSE SERPL-MCNC: 90 MG/DL (ref 70–108)
HCT VFR BLD AUTO: 43.7 % (ref 37–47)
HGB BLD-MCNC: 14.1 GM/DL (ref 12–16)
IMM GRANULOCYTES # BLD AUTO: 0.03 THOU/MM3 (ref 0–0.07)
IMM GRANULOCYTES NFR BLD AUTO: 0.4 %
LYMPHOCYTES ABSOLUTE: 1.8 THOU/MM3 (ref 1–4.8)
LYMPHOCYTES NFR BLD AUTO: 23.9 %
MCH RBC QN AUTO: 29.7 PG (ref 26–33)
MCHC RBC AUTO-ENTMCNC: 32.3 GM/DL (ref 32.2–35.5)
MCV RBC AUTO: 92 FL (ref 81–99)
MONOCYTES ABSOLUTE: 0.7 THOU/MM3 (ref 0.4–1.3)
MONOCYTES NFR BLD AUTO: 9 %
NEUTROPHILS NFR BLD AUTO: 62.9 %
NRBC BLD AUTO-RTO: 0 /100 WBC
NT-PROBNP SERPL IA-MCNC: 57.7 PG/ML (ref 0–124)
OSMOLALITY SERPL CALC.SUM OF ELEC: 280 MOSMOL/KG (ref 275–300)
PLATELET # BLD AUTO: 262 THOU/MM3 (ref 130–400)
PMV BLD AUTO: 10.4 FL (ref 9.4–12.4)
POTASSIUM SERPL-SCNC: 3.9 MEQ/L (ref 3.5–5.2)
PROT SERPL-MCNC: 7.2 G/DL (ref 6.1–8)
RBC # BLD AUTO: 4.75 MILL/MM3 (ref 4.2–5.4)
SARS-COV-2 RNA RESP QL NAA+PROBE: NOT DETECTED
SEGMENTED NEUTROPHILS ABSOLUTE COUNT: 4.8 THOU/MM3 (ref 1.8–7.7)
SODIUM SERPL-SCNC: 139 MEQ/L (ref 135–145)
T4 FREE SERPL-MCNC: 1.14 NG/DL (ref 0.93–1.76)
TROPONIN T: < 0.01 NG/ML
TSH SERPL DL<=0.005 MIU/L-ACNC: 4.78 UIU/ML (ref 0.4–4.2)
WBC # BLD AUTO: 7.6 THOU/MM3 (ref 4.8–10.8)

## 2023-02-23 PROCEDURE — 93005 ELECTROCARDIOGRAM TRACING: CPT | Performed by: STUDENT IN AN ORGANIZED HEALTH CARE EDUCATION/TRAINING PROGRAM

## 2023-02-23 PROCEDURE — 96375 TX/PRO/DX INJ NEW DRUG ADDON: CPT

## 2023-02-23 PROCEDURE — 71046 X-RAY EXAM CHEST 2 VIEWS: CPT

## 2023-02-23 PROCEDURE — 80053 COMPREHEN METABOLIC PANEL: CPT

## 2023-02-23 PROCEDURE — 96374 THER/PROPH/DIAG INJ IV PUSH: CPT

## 2023-02-23 PROCEDURE — 84439 ASSAY OF FREE THYROXINE: CPT

## 2023-02-23 PROCEDURE — 87636 SARSCOV2 & INF A&B AMP PRB: CPT

## 2023-02-23 PROCEDURE — 84484 ASSAY OF TROPONIN QUANT: CPT

## 2023-02-23 PROCEDURE — 93010 ELECTROCARDIOGRAM REPORT: CPT | Performed by: NUCLEAR MEDICINE

## 2023-02-23 PROCEDURE — 85025 COMPLETE CBC W/AUTO DIFF WBC: CPT

## 2023-02-23 PROCEDURE — 99285 EMERGENCY DEPT VISIT HI MDM: CPT

## 2023-02-23 PROCEDURE — 83880 ASSAY OF NATRIURETIC PEPTIDE: CPT

## 2023-02-23 PROCEDURE — 36415 COLL VENOUS BLD VENIPUNCTURE: CPT

## 2023-02-23 PROCEDURE — 6360000002 HC RX W HCPCS: Performed by: STUDENT IN AN ORGANIZED HEALTH CARE EDUCATION/TRAINING PROGRAM

## 2023-02-23 PROCEDURE — 6370000000 HC RX 637 (ALT 250 FOR IP): Performed by: STUDENT IN AN ORGANIZED HEALTH CARE EDUCATION/TRAINING PROGRAM

## 2023-02-23 PROCEDURE — 84443 ASSAY THYROID STIM HORMONE: CPT

## 2023-02-23 RX ORDER — METOCLOPRAMIDE HYDROCHLORIDE 5 MG/ML
10 INJECTION INTRAMUSCULAR; INTRAVENOUS ONCE
Status: COMPLETED | OUTPATIENT
Start: 2023-02-23 | End: 2023-02-23

## 2023-02-23 RX ORDER — ACETAMINOPHEN 325 MG/1
650 TABLET ORAL ONCE
Status: COMPLETED | OUTPATIENT
Start: 2023-02-23 | End: 2023-02-23

## 2023-02-23 RX ORDER — PROPARACAINE HYDROCHLORIDE 5 MG/ML
2 SOLUTION/ DROPS OPHTHALMIC ONCE
Status: DISCONTINUED | OUTPATIENT
Start: 2023-02-23 | End: 2023-02-23 | Stop reason: HOSPADM

## 2023-02-23 RX ORDER — DIPHENHYDRAMINE HYDROCHLORIDE 50 MG/ML
12.5 INJECTION INTRAMUSCULAR; INTRAVENOUS ONCE
Status: COMPLETED | OUTPATIENT
Start: 2023-02-23 | End: 2023-02-23

## 2023-02-23 RX ADMIN — DIPHENHYDRAMINE HYDROCHLORIDE 12.5 MG: 50 INJECTION, SOLUTION INTRAMUSCULAR; INTRAVENOUS at 17:12

## 2023-02-23 RX ADMIN — METOCLOPRAMIDE 10 MG: 5 INJECTION, SOLUTION INTRAMUSCULAR; INTRAVENOUS at 17:12

## 2023-02-23 RX ADMIN — ACETAMINOPHEN 650 MG: 325 TABLET ORAL at 17:12

## 2023-02-23 ASSESSMENT — PAIN SCALES - GENERAL
PAINLEVEL_OUTOF10: 3
PAINLEVEL_OUTOF10: 3

## 2023-02-23 ASSESSMENT — PAIN - FUNCTIONAL ASSESSMENT: PAIN_FUNCTIONAL_ASSESSMENT: 0-10

## 2023-02-23 ASSESSMENT — VISUAL ACUITY
OD: 7
OS: 5
OU: 7

## 2023-02-23 ASSESSMENT — PAIN DESCRIPTION - DESCRIPTORS: DESCRIPTORS: ACHING

## 2023-02-23 ASSESSMENT — PAIN DESCRIPTION - LOCATION: LOCATION: HEAD

## 2023-02-23 NOTE — ED TRIAGE NOTES
Patient presents to ED with c/o hypertension. States that she was not feeling well this morning and felt anxious and then checked her blood pressure and found it elevated. EKG completed. Call light in reach.

## 2023-02-23 NOTE — DISCHARGE INSTRUCTIONS
You can use Tylenol as needed at home for pain  Make sure to continue taking all your medications as prescribed  Follow-up with your family doctor in the next 2 to 3 days  Return to ED if your symptoms worsen or feel you need to be reevaluated. If you begin having vision loss, numbness weakness or tingling, slurred speech or confusion please return to the ED.

## 2023-02-23 NOTE — ED NOTES
Patient resting in bed. Respirations easy and unlabored. No distress noted. Call light within reach.        Stephanie Reyes RN  02/23/23 [id number]

## 2023-02-23 NOTE — ED PROVIDER NOTES
[address] [facility] [address] [address] EMERGENCY DEPT        Pt Name: Velta Najjar  MRN: [id number]  [place] 1955  Date of evaluation: 2/23/2023  Treating Resident Physician: Jake Tsai MD  Supervising Physician: Dr. Johana Alvarez MD    CHIEF COMPLAINT       Chief Complaint   Patient presents with    Hypertension           HISTORY OF PRESENT ILLNESS & REVIEW OF SYSTEMS   HPI    History obtained from patient. Velta Najjar is a 79 y.o. female who presents to the emergency department for evaluation of high blood pressure. Patient states that a week ago she had a \"capsulotomy \"of her right eye. Says that a few weeks back she had one of her left eye. Says that she has been using prednisone drops since after the procedure and her last dose was last night. States that yesterday early afternoon she began having a mild headache. Says its not her worst headache of her life. Says it has is mild and mostly on the top of her head and is intermittent. Says that she has had headaches in the past but this was slightly different. Says that she gets some headaches when her blood pressure is high but this was slightly different. Says that she also had some intermittent bilateral blurry vision since yesterday that is intermittent. Is not having it currently. Says she normally does not have these issues. Denies any falls or trauma. Denies any numbness weakness or tingling. Denies any flashes or floaters. Denies any eye pain. Says that she has reading glasses but otherwise does not have issues with her eyes. Denies any fever chills. Denies any cough chest pain shortness of breath abdominal pain nausea vomiting diarrhea constipation leg swelling. Says that her blood pressure this morning was 158/100 and then it came down later in the 130s. Says that is higher than her normal.  Says that she called her PCP who told her to come in to get checked out because of her symptoms.         Patient denies any new Fever, Chills, Cough, Chest pain, Shortness of breath, Abdominal pain, Nausea, Vomiting, Diarrhea, Constipation, and Leg swelling. The patient has no other acute complaints at this time. Review of systems as above. PAST MEDICAL AND SURGICAL HISTORY     Past Medical History:   Diagnosis Date    Acid reflux     Arthritis     osteo -   knees    Cancer ([facility] 1985? ??    cervical   cryo surgery    Cancer ([facility]     skin-basal cell    Cataracts, bilateral     Constipation     Depression     [facility]     Fibromyalgia     Hearing loss     Hiatal hernia     History of Luis-Barr virus infection     Hoarseness     Hypothyroidism     Mono exposure     Night sweats     Osteoarthritis     Sinus infection     SOB (shortness of breath)     UTI (urinary tract infection) 05/30/2018     Past Surgical History:   Procedure Laterality Date    CARDIAC CATHETERIZATION  2021    no stents    CARDIOVASCULAR STRESS TEST  03/14/2011    No evidence of ischemia is noted. CARPAL TUNNEL RELEASE Right     CHOLECYSTECTOMY  2013    COLONOSCOPY      last one 2014    COLONOSCOPY  11/2016    DOPPLER ECHOCARDIOGRAPHY  03/18/2011    EF 55-65%    EKG 12 LEAD (HISTORICAL)  04/2017    FRACTURE SURGERY  2000??    right shoulder    HYSTERECTOMY (CERVIX STATUS UNKNOWN)      2001??    OTHER SURGICAL HISTORY  10/29/2014    Cystoscopy (Dr. Zay Orozco, [address] [place] [address])    [address] [place] [address]  06/2015    TONSILLECTOMY      as a teenager     Patient Active Problem List   Diagnosis Code    Hypothyroidism E03.9    Arthritis M19.90    Cancer ([facility] C80.1    Acid reflux K21.9    Coronary artery disease of native artery of native heart with stable angina pectoris (HCC) I25.118    Angina, class III (HCC) I20.9    Positive cardiac stress test R94.39    PVC (premature ventricular contraction) I49.3         MEDICATIONS   No current facility-administered medications for this encounter.     Current Outpatient Medications:     albuterol sulfate HFA (VENTOLIN HFA) 108 (90 Base) MCG/ACT inhaler, Inhale 2 puffs into the lungs 4 times daily as needed for Wheezing, Disp: 18 g, Rfl: 0    NP THYROID 30 MG tablet, TAKE 2 TABS BY MOUTH DAILY ON MONDAY, WEDNESDAY, & FRIDAY AND 1 & 1/2 TABS DAILY ON TUESDAY, THURSDAY, SATURDAY, & SUNDAY, Disp: 60 tablet, Rfl: 3    acetaminophen (TYLENOL) 500 MG tablet, Take 1 tablet by mouth every 8 hours as needed for Pain, Disp: 12 tablet, Rfl: 0    metoprolol succinate (TOPROL XL) 25 MG extended release tablet, TAKE 1 TABLET BY MOUTH EVERY DAY, Disp: , Rfl:     esomeprazole (NEXIUM) 40 MG delayed release capsule, TAKE 1 CAPSULE BY MOUTH IN THE MORNING, Disp: , Rfl:     VITAMIN B12 TR 1000 MCG TBCR, TAKE 1 TABLET BY MOUTH EVERY DAY, Disp: , Rfl:     vitamin E 400 UNIT capsule, Take 400 Units by mouth daily, Disp: , Rfl:     NONFORMULARY, UTI clear 1 5 mL dropper full daily, Disp: , Rfl:     Ascorbic Acid (JOELLE-C PO), Take by mouth, Disp: , Rfl:     TURMERIC PO, Take by mouth, Disp: , Rfl:     Zinc Sulfate (ZINC 15 PO), Take 30 mg by mouth, Disp: , Rfl:     L-LYSINE PO, Take by mouth, Disp: , Rfl:     D-MANNOSE PO, Take 1,500 mg by mouth daily, Disp: , Rfl:     Esomeprazole Magnesium 20 MG TBEC, Take 1 tablet by mouth daily Pt taking as needed, Disp: , Rfl:     MAGNESIUM PO, Take 1 tablet by mouth every morning , Disp: , Rfl:     Omega-3 Fatty Acids (FISH OIL PO), Take 1 capsule by mouth every morning , Disp: , Rfl:     Potassium 99 MG TABS, Take 1 tablet by mouth daily , Disp: , Rfl:     loratadine (CLARITIN) 10 MG tablet, Take 1 tablet by mouth daily (Patient taking differently: Take 10 mg by mouth as needed ), Disp: 30 tablet, Rfl: 0    Cholecalciferol (VITAMIN D3) 5000 UNITS TABS, Take 1 tablet by mouth every other day , Disp: , Rfl:     aspirin 81 MG chewable tablet, Take 81 mg by mouth nightly , Disp: , Rfl:     hyoscyamine (LEVSIN) 0.125 MG tablet, Take 0.125 mg by mouth every 4 hours as needed for Cramping., Disp: , Rfl:     GARRY THYROID PO, Take 45 mg by mouth See Admin Instructions DANNY HURD 60mg Tuelvira Hernández Sun 45mg, Disp: , Rfl:   Discharge Medication List as of 2/23/2023  6:55 PM        CONTINUE these medications which have NOT CHANGED    Details   albuterol sulfate HFA (VENTOLIN HFA) 108 (90 Base) MCG/ACT inhaler Inhale 2 puffs into the lungs 4 times daily as needed for Wheezing, Disp-18 g, R-0Normal      !! NP THYROID 30 MG tablet TAKE 2 TABS BY MOUTH DAILY ON MONDAY, WEDNESDAY, & FRIDAY AND 1 & 1/2 TABS DAILY ON TUESDAY, THURSDAY, SATURDAY, & SUNDAY, Disp-60 tablet, R-3, DAWPrint      acetaminophen (TYLENOL) 500 MG tablet Take 1 tablet by mouth every 8 hours as needed for Pain, Disp-12 tablet, R-0Normal      metoprolol succinate (TOPROL XL) 25 MG extended release tablet TAKE 1 TABLET BY MOUTH EVERY DAYHistorical Med      esomeprazole (NEXIUM) 40 MG delayed release capsule TAKE 1 CAPSULE BY MOUTH IN THE MORNINGHistorical Med      VITAMIN B12 TR 1000 MCG TBCR TAKE 1 TABLET BY MOUTH EVERY DAY, DAWHistorical Med      vitamin E 400 UNIT capsule Take 400 Units by mouth dailyHistorical Med      NONFORMULARY UTI clear 1 5 mL dropper full dailyHistorical Med      Ascorbic Acid (JOELLE-C PO) Take by mouthHistorical Med      TURMERIC PO Take by mouthHistorical Med      Zinc Sulfate (ZINC 15 PO) Take 30 mg by mouthHistorical Med      L-LYSINE PO Take by mouthHistorical Med      D-MANNOSE PO Take 1,500 mg by mouth dailyHistorical Med      Esomeprazole Magnesium 20 MG TBEC Take 1 tablet by mouth daily Pt taking as neededHistorical Med      MAGNESIUM PO Take 1 tablet by mouth every morning Historical Med      Omega-3 Fatty Acids (FISH OIL PO) Take 1 capsule by mouth every morning Historical Med      Potassium 99 MG TABS Take 1 tablet by mouth daily Historical Med      loratadine (CLARITIN) 10 MG tablet Take 1 tablet by mouth daily, Disp-30 tablet, R-0Print      Cholecalciferol (VITAMIN D3) 5000 UNITS TABS Take 1 tablet by mouth every other day Historical Med      aspirin 81 MG chewable tablet Take 81 mg by mouth nightly Historical Med      hyoscyamine (LEVSIN) 0.125 MG tablet Take 0.125 mg by mouth every 4 hours as needed for Cramping. Historical Med      !! ARMOUR THYROID PO Take 45 mg by mouth See Admin Instructions M W F 60mg  Jeanine Williamson Sat Sun 45mgHistorical Med       !! - Potential duplicate medications found. Please discuss with provider. SOCIAL HISTORY     Social History     Social History Narrative    Not on file     Social History     Tobacco Use    Smoking status: Former     Packs/day: 0.50     Years: 29.00     Pack years: 14.50     Types: Cigarettes     Quit date: 2015     Years since quittin.7    Smokeless tobacco: Never    Tobacco comments:     smoked for 13 years and quit and just start again about 2 years ago   Vaping Use    Vaping Use: Never used   Substance Use Topics    Alcohol use: Yes     Alcohol/week: 2.0 standard drinks     Types: 2 Glasses of wine per week     Comment: occasional    Drug use: Yes     Types: Marijuana Daniele Spina)     Comment: medical marijuana prn         ALLERGIES   No Known Allergies      FAMILY HISTORY     Family History   Problem Relation Age of Onset    Heart Disease Maternal Grandmother 52        MI    Heart Disease Maternal Grandfather     Stroke Maternal Grandfather 46    Diabetes Mother     Heart Disease Mother     Other Mother         complications from accident    Cancer Father         bone prostate    Diabetes Father     High Blood Pressure Brother     Cancer Maternal Aunt         possible colon    Cancer Paternal Grandfather     Breast Cancer Neg Hx          PHYSICAL EXAM     ED Triage Vitals   BP Temp Temp src Pulse Resp SpO2 Height Weight   -- -- -- -- -- -- -- --     Initial vital signs and nursing assessment reviewed and vitals are/show: Afebrile, Borderline hypertensive, Normocardic, and Borderline tachypneic . Pulsoximetry is normal per my interpretation.     Additional Vital Signs:  Vitals:    23 1826   BP: 123/66   Pulse: 76   Resp: 16   Temp:    SpO2: 96%       Physical Exam  Constitutional:       General: She is not in acute distress.     Appearance: Normal appearance. She is obese. She is not ill-appearing, toxic-appearing or diaphoretic.   HENT:      Head: Normocephalic and atraumatic.      Right Ear: External ear normal.      Left Ear: External ear normal.   Eyes:      General: No scleral icterus.        Right eye: No discharge.         Left eye: No discharge.      Extraocular Movements: Extraocular movements intact.      Pupils: Pupils are equal, round, and reactive to light.   Cardiovascular:      Rate and Rhythm: Normal rate and regular rhythm.   Pulmonary:      Effort: Pulmonary effort is normal. No respiratory distress.      Breath sounds: Normal breath sounds. No stridor. No wheezing, rhonchi or rales.   Chest:      Chest wall: No tenderness.   Abdominal:      General: Abdomen is flat. There is no distension.      Palpations: Abdomen is soft.      Tenderness: There is no abdominal tenderness. There is no guarding or rebound.   Musculoskeletal:      Cervical back: Neck supple.      Right lower leg: No edema.      Left lower leg: No edema.   Skin:     General: Skin is warm and dry.   Neurological:      Mental Status: She is alert and oriented to person, place, and time. Mental status is at baseline.      Comments: GCS 15  Alert and oriented x3  Spontaneously moves all 4 extremities  Follows commands  Bilateral facial motor and sensation intact in distribution of CN V1 V2 V3  Bilateral upper extremity motor and sensation intact  Bilateral lower extremity motor and sensation intact  No drift upper extremities  No drift lower extremities  No gait ataxia  Finger-to-nose testing bilaterally normal  PERRLA  EOM intact  Visual field testing grossly normal in all quadrants.       Psychiatric:         Mood and Affect: Mood normal.         Behavior: Behavior normal.         Thought Content: Thought content normal.     Judgment: Judgment normal.       PREVIOUS RECORDS   Previous records reviewed:  Patient was seen 1/20/2023 for endocrinology office visit regarding acquired hypothyroidism . MEDICAL DECISION MAKING/ED COURSE   Initial Assessment:     79 y.o. female presenting to the ED for headache and hypertension. Differential diagnoses include but not limited to: Headache migraine glaucoma CVA TIA ACS arrhythmia electrolyte abnormality dehydration COVID influenza viral retinal detachment CRVO CRAO FB    Plan:       EKG  Labs  Imaging  Proparacaine  Tylenol  Reglan  Benadryl  IOP: WNL  Visual acuity  ABCD2 Score: 3- low risk  Declined CT imaging  Discharge        Brief Summary of Patient Presentation:    Patient is a 79 y.o. female with a past medical history of CAD, hypothyroidism, arthritis, epilepsy, GERD who was seen and evaluated in the emergency department for HTN. Patient's vital signs are stable. EKG was reassuring. She did have a headache but had no significant red flag symptoms other than some blurry vision that she mention. She did not have that here in the ED. Given her recent procedure we did check intraocular pressures which were normal.  We treated her headache with significant improvement in her symptoms. Lab work was unremarkable. If this is secondary to a TIA her ABCD 2 score was a 3 which is low risk. We did offer her CT imaging for potential TIA versus CVA which she declined. After discussion with patient, through shared his urine, she is comfortable with discharge and following up with her PCP. Discussed strict return precautions. Patient discharged. The patient was seen and examined unless otherwise specified. Appropriate diagnostic testing was performed and results reviewed with the patient. My independent review and interpretation of data, imaging, labs and diagnostic evaluations are as above and in the ED Course.   Previous patient encounter documents & history available on EMR were reviewed  Case discussed with consulting clinician:  none  Shared Decision-Making was performed and disposition discussed with the Patient/Family and questions answered. MDM  Number of Diagnoses or Management Options  Acute nonintractable headache, unspecified headache type: new, needed workup     Amount and/or Complexity of Data Reviewed  Clinical lab tests: ordered and reviewed  Tests in the radiology section of CPT®: ordered and reviewed  Tests in the medicine section of CPT®: reviewed and ordered  Decide to obtain previous medical records or to obtain history from someone other than the patient: yes  Obtain history from someone other than the patient: no  Review and summarize past medical records: yes  Discuss the patient with other providers: no  Independent visualization of images, tracings, or specimens: yes    /  ED Course as of 02/24/23 0050 Thu Feb 23, 2023 1659 EKG shows no signs of acute ischemia. [CR]   8912 CXR:IMPRESSION:  Stable radiographic appearance of the chest. No evidence of an acute process. [CR]   8168 IOP right eye: 9  IO left eye: 9 [CR]   1835 COVID influenza negative  CMP unremarkable  CBC unremarkable  BNP troponin within normal limits [CR]   1835 TSH elevated [CR]   1837 Patient states her headache is nearly completely gone, says she feels much better and was even able to get a nap. Discussed work-up with patient, discussed potentially obtaining CT imaging for CVA evaluation. She states she feels safe and comfortable with discharge and following up with her family doctor. She states she feels comfortable foregoing CT imaging now and says that if her symptoms worsen or come back she will return to the ED [CR]      ED Course User Index  [CR] Gustavo Carter MD       ED COURSE:  ED Course as of 02/24/23 0050 Thu Feb 23, 2023 1659 EKG shows no signs of acute ischemia.  [CR]   0597 CXR:IMPRESSION:  Stable radiographic appearance of the chest. No evidence of an acute process. [CR]   5019 IOP right eye: 9  IO left eye: 9 [CR]   1835 COVID influenza negative  CMP unremarkable  CBC unremarkable  BNP troponin within normal limits [CR]   1835 TSH elevated [CR]   1837 Patient states her headache is nearly completely gone, says she feels much better and was even able to get a nap. Discussed work-up with patient, discussed potentially obtaining CT imaging for CVA evaluation. She states she feels safe and comfortable with discharge and following up with her family doctor. She states she feels comfortable foregoing CT imaging now and says that if her symptoms worsen or come back she will return to the ED [CR]      ED Course User Index  [CR] Jc Bob MD                 ED Medications administered this visit:  (None if blank)  Medications   acetaminophen (TYLENOL) tablet 650 mg (650 mg Oral Given 2/23/23 1712)   metoclopramide (REGLAN) injection 10 mg (10 mg IntraVENous Given 2/23/23 1712)   diphenhydrAMINE (BENADRYL) injection 12.5 mg (12.5 mg IntraVENous Given 2/23/23 1712)         PROCEDURES: (None if blank)  Procedures:     CRITICAL CARE: (None if blank)      DISCHARGE PRESCRIPTIONS: (None if blank)  Discharge Medication List as of 2/23/2023  6:55 PM          FORMAL DIAGNOSTIC RESULTS     RADIOLOGY: Interpretation per the Radiologist below, if available at the time of this note (none if blank):    XR CHEST (2 VW)   Final Result   Stable radiographic appearance of the chest. No evidence of an acute process. **This report has been created using voice recognition software. It may contain minor errors which are inherent in voice recognition technology. **      Final report electronically signed by Dr. Meghan Green MD on 2/23/2023 5:24 PM          LABS: (none if blank)  Labs Reviewed   CBC WITH AUTO DIFFERENTIAL - Abnormal; Notable for the following components:       Result Value    RDW-SD 47.1 (*)     All other components within normal limits   TSH WITH REFLEX - Abnormal; Notable for the following components:    TSH 4.780 (*)     All other components within normal limits   COVID-19 & INFLUENZA COMBO   COMPREHENSIVE METABOLIC PANEL   TROPONIN   BRAIN NATRIURETIC PEPTIDE   ANION GAP   GLOMERULAR FILTRATION RATE, ESTIMATED   OSMOLALITY   T4, FREE             Strict return precautions and follow up instructions were discussed with the patient prior to discharge, with which the patient agrees. MEDICATION CHANGES     Discharge Medication List as of 2/23/2023  6:55 PM          FINAL IMPRESSION      1. Acute nonintractable headache, unspecified headache type          FINAL DISPOSITION     Final diagnoses:   Acute nonintractable headache, unspecified headache type     Condition: condition: stable  Dispo: Discharge to home      This transcription was electronically signed. Parts of this transcriptions may have been dictated by use of voice recognition software and electronically transcribed, and parts may have been transcribed with the assistance of an ED scribe. The transcription may contain errors not detected in proofreading. Please refer to my supervising physician's documentation if my documentation differs.     Electronically Signed: Krysta Zavala MD, 02/24/23, 12:50 AM         Krysta Zavala MD  Resident  02/24/23 [id number]

## 2023-03-27 ENCOUNTER — OFFICE VISIT (OUTPATIENT)
Dept: UROLOGY | Age: 68
End: 2023-03-27
Payer: MEDICARE

## 2023-03-27 VITALS
SYSTOLIC BLOOD PRESSURE: 116 MMHG | BODY MASS INDEX: 39.71 KG/M2 | DIASTOLIC BLOOD PRESSURE: 67 MMHG | WEIGHT: 262 LBS | HEIGHT: 68 IN

## 2023-03-27 DIAGNOSIS — R39.14 FEELING OF INCOMPLETE BLADDER EMPTYING: ICD-10-CM

## 2023-03-27 DIAGNOSIS — N28.1 RENAL CYST, ACQUIRED, LEFT: ICD-10-CM

## 2023-03-27 DIAGNOSIS — N39.0 RECURRENT UTI: Primary | ICD-10-CM

## 2023-03-27 LAB — POST VOID RESIDUAL (PVR): 282 ML

## 2023-03-27 PROCEDURE — 3017F COLORECTAL CA SCREEN DOC REV: CPT | Performed by: PHYSICIAN ASSISTANT

## 2023-03-27 PROCEDURE — G8417 CALC BMI ABV UP PARAM F/U: HCPCS | Performed by: PHYSICIAN ASSISTANT

## 2023-03-27 PROCEDURE — G8484 FLU IMMUNIZE NO ADMIN: HCPCS | Performed by: PHYSICIAN ASSISTANT

## 2023-03-27 PROCEDURE — 1090F PRES/ABSN URINE INCON ASSESS: CPT | Performed by: PHYSICIAN ASSISTANT

## 2023-03-27 PROCEDURE — 1123F ACP DISCUSS/DSCN MKR DOCD: CPT | Performed by: PHYSICIAN ASSISTANT

## 2023-03-27 PROCEDURE — 51798 US URINE CAPACITY MEASURE: CPT | Performed by: PHYSICIAN ASSISTANT

## 2023-03-27 PROCEDURE — 99213 OFFICE O/P EST LOW 20 MIN: CPT | Performed by: PHYSICIAN ASSISTANT

## 2023-03-27 PROCEDURE — G8400 PT W/DXA NO RESULTS DOC: HCPCS | Performed by: PHYSICIAN ASSISTANT

## 2023-03-27 PROCEDURE — G8427 DOCREV CUR MEDS BY ELIG CLIN: HCPCS | Performed by: PHYSICIAN ASSISTANT

## 2023-03-27 PROCEDURE — 1036F TOBACCO NON-USER: CPT | Performed by: PHYSICIAN ASSISTANT

## 2023-03-27 NOTE — PATIENT INSTRUCTIONS
Start Miralax (over the counter) every morning for constipation. Follow directions on the bottle. Continue Magnesium.

## 2023-03-30 ENCOUNTER — TELEPHONE (OUTPATIENT)
Dept: UROLOGY | Age: 68
End: 2023-03-30

## 2023-03-30 DIAGNOSIS — N39.0 RECURRENT UTI: Primary | ICD-10-CM

## 2023-03-30 NOTE — TELEPHONE ENCOUNTER
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 She does not feel well and thinks she has infection. C/o pressure and not feeling very well. She denies pain. No c/o fever or chills. 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 Patient declined stating she could not get off work and would wait to tomorrow. Advised if symptoms worsen, develops fever, chills, or unable to void to be evaluated in the ER.

## 2023-03-31 ENCOUNTER — NURSE ONLY (OUTPATIENT)
Dept: UROLOGY | Age: 68
End: 2023-03-31

## 2023-03-31 DIAGNOSIS — R33.9 URINARY RETENTION: Primary | ICD-10-CM

## 2023-03-31 NOTE — PROGRESS NOTES
Following NEIDA Nicole plan of care. Patient instructed on self cath with 16 Fr straight catheter with a residual of 250 ml    Patient was supplied with a 16 Fr straight catheter containing a water soluble lubricant, and a urinary hat for voiding log. Patient was instructed to wash her hands and clean vaginal area. Then while patient was holding the mirror in supine position with legs spread I placed catheter educating patient to separate the labia well and aim the catheter up as they are placing the catheter into the urethra. Once I had shown the patient I gave the catheter to her and ensured she was comfortable with placing catheter herself. Patient was advised that she needs to void on her own prior to using the catheter, and then to keep track of urine amount from the catheter. Prescription for catheter was faxed to company, supplying a new catheter for each use.

## 2023-04-02 LAB
BACTERIA UR CULT: ABNORMAL
ORGANISM: ABNORMAL

## 2023-04-03 ENCOUNTER — HOSPITAL ENCOUNTER (EMERGENCY)
Age: 68
Discharge: HOME | End: 2023-04-03
Attending: EMERGENCY MEDICINE
Payer: MEDICARE

## 2023-04-03 VITALS
WEIGHT: 260 LBS | RESPIRATION RATE: 16 BRPM | SYSTOLIC BLOOD PRESSURE: 117 MMHG | BODY MASS INDEX: 38.51 KG/M2 | HEIGHT: 69 IN | TEMPERATURE: 98.6 F | OXYGEN SATURATION: 96 % | HEART RATE: 82 BPM | DIASTOLIC BLOOD PRESSURE: 55 MMHG

## 2023-04-03 DIAGNOSIS — N30.01 ACUTE CYSTITIS WITH HEMATURIA: Primary | ICD-10-CM

## 2023-04-03 LAB
AMORPH SED URNS QL MICRO: ABNORMAL
BACTERIA URNS QL MICRO: ABNORMAL /HPF
BILIRUB UR QL STRIP.AUTO: NEGATIVE
CASTS #/AREA URNS LPF: ABNORMAL /LPF
CASTS 2: ABNORMAL /LPF
CHARACTER UR: ABNORMAL
COLOR: ABNORMAL
CRYSTALS URNS MICRO: ABNORMAL
EPITHELIAL CELLS, UA: ABNORMAL /HPF
GLUCOSE UR QL STRIP.AUTO: NEGATIVE MG/DL
HGB UR QL STRIP.AUTO: ABNORMAL
KETONES UR QL STRIP.AUTO: NEGATIVE
MISCELLANEOUS 2: ABNORMAL
MUCOUS THREADS URNS QL MICRO: ABNORMAL
NITRITE UR QL STRIP: NEGATIVE
PH UR STRIP.AUTO: 7 [PH] (ref 5–9)
PROT UR STRIP.AUTO-MCNC: 100 MG/DL
RBC URINE: > 100 /HPF
RENAL EPI CELLS #/AREA URNS HPF: ABNORMAL /[HPF]
SP GR UR REFRACT.AUTO: 1.01 (ref 1–1.03)
UROBILINOGEN, URINE: 0.2 EU/DL (ref 0–1)
WBC #/AREA URNS HPF: > 200 /HPF
WBC #/AREA URNS HPF: ABNORMAL /[HPF]
YEAST LIKE FUNGI URNS QL MICRO: ABNORMAL

## 2023-04-03 PROCEDURE — 81001 URINALYSIS AUTO W/SCOPE: CPT

## 2023-04-03 PROCEDURE — 99283 EMERGENCY DEPT VISIT LOW MDM: CPT

## 2023-04-03 PROCEDURE — 87086 URINE CULTURE/COLONY COUNT: CPT

## 2023-04-03 PROCEDURE — 87077 CULTURE AEROBIC IDENTIFY: CPT

## 2023-04-03 PROCEDURE — 87186 SC STD MICRODIL/AGAR DIL: CPT

## 2023-04-03 RX ORDER — CEPHALEXIN 500 MG/1
500 CAPSULE ORAL 4 TIMES DAILY
Qty: 28 CAPSULE | Refills: 0 | Status: SHIPPED | OUTPATIENT
Start: 2023-04-03 | End: 2023-04-10

## 2023-04-03 ASSESSMENT — PAIN DESCRIPTION - DESCRIPTORS: DESCRIPTORS: BURNING

## 2023-04-03 ASSESSMENT — PAIN - FUNCTIONAL ASSESSMENT: PAIN_FUNCTIONAL_ASSESSMENT: 0-10

## 2023-04-03 ASSESSMENT — PAIN SCALES - GENERAL: PAINLEVEL_OUTOF10: 10

## 2023-04-03 ASSESSMENT — PAIN DESCRIPTION - LOCATION: LOCATION: ABDOMEN;OTHER (COMMENT)

## 2023-04-03 NOTE — ED NOTES
Straight cath performed. Patient resting in bed. Respirations easy and unlabored. No distress noted. Call light within reach.      Destiny Valdivia RN  04/03/23 0116

## 2023-04-03 NOTE — ED PROVIDER NOTES
every 4 hours as needed for Cramping. L-LYSINE PO    Take by mouth    LORATADINE (CLARITIN) 10 MG TABLET    Take 1 tablet by mouth daily    MAGNESIUM PO    Take 1 tablet by mouth every morning     METOPROLOL SUCCINATE (TOPROL XL) 25 MG EXTENDED RELEASE TABLET    TAKE 1 TABLET BY MOUTH EVERY DAY    NONFORMULARY    UTI clear 1 5 mL dropper full daily    NP THYROID 30 MG TABLET    TAKE 2 TABS BY MOUTH DAILY ON MONDAY, WEDNESDAY, & FRIDAY AND 1 & 1/2 TABS DAILY ON TUESDAY, THURSDAY, SATURDAY, &     OMEGA-3 FATTY ACIDS (FISH OIL PO)    Take 1 capsule by mouth every morning     POTASSIUM 99 MG TABS    Take 1 tablet by mouth daily     TURMERIC PO    Take by mouth    VITAMIN B12 TR 1000 MCG TBCR    TAKE 1 TABLET BY MOUTH EVERY DAY    VITAMIN E 400 UNIT CAPSULE    Take 400 Units by mouth daily    ZINC SULFATE (ZINC 15 PO)    Take 30 mg by mouth         SOCIAL HISTORY     Social History     Social History Narrative    Not on file     Social History     Tobacco Use    Smoking status: Former     Packs/day: 0.50     Years: 29.00     Pack years: 14.50     Types: Cigarettes     Quit date: 2015     Years since quittin.8    Smokeless tobacco: Never    Tobacco comments:     smoked for 13 years and quit and just start again about 2 years ago   Vaping Use    Vaping Use: Never used   Substance Use Topics    Alcohol use:  Yes     Alcohol/week: 2.0 standard drinks     Types: 2 Glasses of wine per week     Comment: occasional    Drug use: Yes     Types: Marijuana Judith Moustapha)     Comment: medical marijuana prn         ALLERGIES   No Known Allergies      FAMILY HISTORY     Family History   Problem Relation Age of Onset    Heart Disease Maternal Grandmother 52        MI    Heart Disease Maternal Grandfather     Stroke Maternal Grandfather 46    Diabetes Mother     Heart Disease Mother     Other Mother         complications from accident    Cancer Father         bone prostate    Diabetes Father     High Blood Pressure Brother

## 2023-04-03 NOTE — ED TRIAGE NOTES
Patient to ED from home c/c pyuria and hematuria. Patient reports she sees [facility] and has been having difficulty emptying her bladder. Patient reports they taught her how to straight cath herself 2x a day, morning and night. Patient reports she noticed blood the other day, but increase in pain when urinating today. Rates pain 10/10 when urinating.

## 2023-04-05 LAB
BACTERIA UR CULT: ABNORMAL
ORGANISM: ABNORMAL

## 2023-04-06 NOTE — PROGRESS NOTES
Pharmacy Note  ED Culture Follow-up    Steve Dove is a 79 y.o. female. Allergies: Patient has no known allergies. Current antimicrobials:   Reviewed patient's urine culture - culture positive for E. coli. Patient was discharged on cephalexin, and culture is sensitive to prescribed medication. Antibiotic prescribed at discharge is appropriate - no changes made to antibiotic regimen. Please call with any questions.  [address] [facility] [place], PharmD 3:00 PM 4/6/2023

## 2023-05-09 ENCOUNTER — HOSPITAL ENCOUNTER (OUTPATIENT)
Dept: ULTRASOUND IMAGING | Age: 68
Discharge: HOME | End: 2023-05-09
Payer: MEDICARE

## 2023-05-09 DIAGNOSIS — K76.0 FATTY LIVER: ICD-10-CM

## 2023-05-09 DIAGNOSIS — R10.11 RUQ PAIN: ICD-10-CM

## 2023-05-09 DIAGNOSIS — R11.0 NAUSEA: ICD-10-CM

## 2023-05-09 PROCEDURE — 76705 ECHO EXAM OF ABDOMEN: CPT

## 2023-09-14 ENCOUNTER — HOSPITAL ENCOUNTER (EMERGENCY)
Age: 68
Discharge: HOME | End: 2023-09-14
Payer: MEDICARE

## 2023-09-14 VITALS
OXYGEN SATURATION: 98 % | RESPIRATION RATE: 16 BRPM | HEART RATE: 62 BPM | WEIGHT: 265.8 LBS | DIASTOLIC BLOOD PRESSURE: 75 MMHG | TEMPERATURE: 98.5 F | SYSTOLIC BLOOD PRESSURE: 126 MMHG | BODY MASS INDEX: 40.41 KG/M2

## 2023-09-14 DIAGNOSIS — J02.9 ACUTE PHARYNGITIS, UNSPECIFIED ETIOLOGY: Primary | ICD-10-CM

## 2023-09-14 LAB — SARS-COV-2 RDRP RESP QL NAA+PROBE: NOT  DETECTED

## 2023-09-14 PROCEDURE — 99213 OFFICE O/P EST LOW 20 MIN: CPT | Performed by: NURSE PRACTITIONER

## 2023-09-14 PROCEDURE — 87635 SARS-COV-2 COVID-19 AMP PRB: CPT

## 2023-09-14 PROCEDURE — 99213 OFFICE O/P EST LOW 20 MIN: CPT

## 2023-09-14 RX ORDER — AMOXICILLIN 875 MG/1
875 TABLET, COATED ORAL 2 TIMES DAILY
Qty: 20 TABLET | Refills: 0 | Status: SHIPPED | OUTPATIENT
Start: 2023-09-14 | End: 2023-09-24

## 2023-09-14 ASSESSMENT — ENCOUNTER SYMPTOMS
RHINORRHEA: 0
VOMITING: 0
SORE THROAT: 1
NAUSEA: 0
DIARRHEA: 0
CHEST TIGHTNESS: 0
COUGH: 1
SHORTNESS OF BREATH: 0

## 2023-09-14 ASSESSMENT — PAIN DESCRIPTION - LOCATION: LOCATION: GENERALIZED

## 2023-09-14 ASSESSMENT — PAIN SCALES - GENERAL: PAINLEVEL_OUTOF10: 2

## 2023-09-14 ASSESSMENT — PAIN - FUNCTIONAL ASSESSMENT: PAIN_FUNCTIONAL_ASSESSMENT: 0-10

## 2023-09-14 NOTE — ED PROVIDER NOTES
[address]  Urgent Care Encounter       CHIEF COMPLAINT       Chief Complaint   Patient presents with    Pharyngitis       Nurses Notes reviewed and I agree except as noted in the HPI. HISTORY OF PRESENT ILLNESS   Maulik Henry is a 76 y.o. female who presents to the [facility] urgent care for evaluation of pharyngitis. She reports that her symptoms started roughly 1 week ago. She reports congestion, cough, pharyngitis, myalgia, and fatigue. Denies nausea, vomiting, or diarrhea. Denies fever or chills. Denies known exposure to someone ill. The history is provided by the patient. No  was used. REVIEW OF SYSTEMS     Review of Systems   Constitutional:  Positive for fatigue. Negative for activity change, appetite change, chills and fever. HENT:  Positive for congestion and sore throat. Negative for ear discharge, ear pain and rhinorrhea. Respiratory:  Positive for cough. Negative for chest tightness and shortness of breath. Cardiovascular:  Negative for chest pain. Gastrointestinal:  Negative for diarrhea, nausea and vomiting. Genitourinary:  Negative for dysuria. Musculoskeletal:  Positive for myalgias. Skin:  Negative for rash. Allergic/Immunologic: Negative for environmental allergies and food allergies. Neurological:  Negative for dizziness and headaches. PAST MEDICAL HISTORY         Diagnosis Date    Acid reflux     Arthritis     osteo -   knees    Cancer ([address]) 1985? ??    cervical   cryo surgery    Cancer ([address])     skin-basal cell    Cataracts, bilateral     Constipation     Depression     Epilepsy ([address])     Fibromyalgia     Hearing loss     Hiatal hernia     History of Luis-Barr virus infection     Hoarseness     Hypothyroidism     Mono exposure     Night sweats     Osteoarthritis     Sinus infection     SOB (shortness of breath)     UTI (urinary tract infection) 05/30/2018       SURGICALHISTORY     Patient  has a past

## 2023-09-14 NOTE — ED TRIAGE NOTES
Efrain Car arrives to room with complaint of  sore throat, fatigue, muscle aches, sinus congestion for past week , cough started yesterday

## 2023-11-17 ENCOUNTER — HOSPITAL ENCOUNTER (EMERGENCY)
Age: 68
Discharge: HOME | End: 2023-11-17
Payer: MEDICARE

## 2023-11-17 VITALS
OXYGEN SATURATION: 99 % | TEMPERATURE: 98 F | HEIGHT: 68 IN | BODY MASS INDEX: 39.4 KG/M2 | SYSTOLIC BLOOD PRESSURE: 134 MMHG | DIASTOLIC BLOOD PRESSURE: 65 MMHG | HEART RATE: 66 BPM | RESPIRATION RATE: 17 BRPM | WEIGHT: 260 LBS

## 2023-11-17 DIAGNOSIS — I10 ASYMPTOMATIC HYPERTENSION: Primary | ICD-10-CM

## 2023-11-17 PROCEDURE — 99282 EMERGENCY DEPT VISIT SF MDM: CPT

## 2023-11-17 NOTE — DISCHARGE INSTRUCTIONS
Return to ER for chest pains or shortness of breath. Return for fever/chills. Return for flank pain, confusion altered level of consciousness or disequilibrium. Return for any other medical concerns. Please keep a diary of your blood pressure checks and take them to your follow-up appointment with cardiology. Continue to take your Lopressor (metoprolol) as prescribed. Follow-up with your family doctor in a week or so for additional follow-up and to keep them updated on your medical situation.
Abnormal Lactate: > 2

## 2023-11-17 NOTE — ED NOTES
Presents to ED with complaints of high blood pressure. Pt states she has had a intermittent headache since Tuesday. Pt reports her blood pressure has been 064-768 systolic when her normal is 120. Pt denies headache at this time. Blood pressure upon arrival 134/65.      Tay, [address] Juan Francisco DELGADO, RN  11/17/23 7061

## 2023-11-17 NOTE — ED PROVIDER NOTES
[address] [facility] EMERGENCY DEPT      EMERGENCY MEDICINE     Pt Name: Tom Arreaga  MRN: [id number]  [address] [facility] 1955  Date of evaluation: 11/17/2023  Provider: LIS Burgos CNP    CHIEF COMPLAINT       Chief Complaint   Patient presents with    Hypertension     HISTORY OF PRESENT ILLNESS   Tom Arreaga is a pleasant 76 y.o. female who presents to the emergency department from home by personal transportation for high blood pressure, she is taking Lopressor for several months which is managed by cardiologist.  She took her blood pressure this morning and it was 168/96, this was 30 minutes after taking her Lopressor pill. Denies changes in vision, denies headache, denies shortness of breath. Denies any nausea/vomiting. Denies any disequilibrium or confusion. Denies altered level of consciousness. Denies any flank pain LUTS or changes in her urine sent. Denies any unilateral weakness or generalized weakness. Denies any symptoms at all. Denies any changes in hearing. Denies leg swelling or signs of fluid overload. History obtained from patient  PASTMEDICAL HISTORY     Past Medical History:   Diagnosis Date    Acid reflux     Arthritis     osteo -   knees    Cancer ([address]) 1985? ??    cervical   cryo surgery    Cancer ([address])     skin-basal cell    Cataracts, bilateral     Constipation     Depression     Epilepsy ([address])     Fibromyalgia     Hearing loss     Hiatal hernia     History of Luis-Barr virus infection     Hoarseness     Hypothyroidism     Mono exposure     Night sweats     Osteoarthritis     Sinus infection     SOB (shortness of breath)     UTI (urinary tract infection) 05/30/2018       Patient Active Problem List   Diagnosis Code    Hypothyroidism E03.9    Arthritis M19.90    Cancer ([address]) C80.1    Acid reflux K21.9    Coronary artery disease of native artery of native heart with stable angina pectoris (HCC) I25.118    Angina, class III (HCC) I20.9    Positive cardiac stress test R94.39 present. Mental Status: She is alert and oriented to person, place, and time. Motor: No weakness. Coordination: Coordination normal.      Gait: Gait normal.   Psychiatric:         Mood and Affect: Mood normal.         Behavior: Behavior normal.         FORMAL DIAGNOSTIC RESULTS     RADIOLOGY: Interpretation per the Radiologist below, if available at the time of this note (none if blank): No orders to display     LABS: (none if blank)  Labs Reviewed - No data to display  (Any cultures that may have been sent were not resulted at the time of this patient visit)  [facility] / ED COURSE:     1) Number and Complexity of Problems      Problem List This Visit:         Chief Complaint   Patient presents with    Hypertension         Differential Diagnosis includes (but not limited to):  Asymptomatic HTN. Less likely hypertensive urgency           Pertinent Comorbid Conditions:    HTN, on Lopressor. 2)  Data Reviewed (none if left blank)        My Independent interpretations:     EKG:      see ED course    Imaging: See ED Course    Labs:      See ED course          Decision Rules/Clinical Scores utilized:            External Documentation Reviewed:         Previous patient encounter documents & history available on EMR was reviewed          See Formal Diagnostic Results above for the lab and radiology tests and orders. 3)  Treatment and Disposition         ED Reassessment:  see ed course         Case discussed with consulting clinician:           Shared Decision-Making was performed and disposition discussed with the        Patient/Family and questions answered         Social determinants of health impacting treatment or disposition:           Code Status: Not discussed during this ER encounter    Summary of Patient Presentation:    Medical Decision Making  Arrives with chief complaint of asymptomatic hypertension.   Discussed ASAP guidelines, patient endorses good follow-up and being

## 2023-12-08 ENCOUNTER — OFFICE VISIT (OUTPATIENT)
Dept: BARIATRICS/WEIGHT MGMT | Age: 68
End: 2023-12-08
Payer: MEDICARE

## 2023-12-08 VITALS
DIASTOLIC BLOOD PRESSURE: 82 MMHG | SYSTOLIC BLOOD PRESSURE: 114 MMHG | TEMPERATURE: 97.5 F | BODY MASS INDEX: 39.83 KG/M2 | WEIGHT: 262.8 LBS | HEIGHT: 68 IN | HEART RATE: 76 BPM

## 2023-12-08 DIAGNOSIS — E66.01 MORBID OBESITY DUE TO EXCESS CALORIES (HCC): ICD-10-CM

## 2023-12-08 DIAGNOSIS — K21.9 GASTROESOPHAGEAL REFLUX DISEASE WITHOUT ESOPHAGITIS: Primary | ICD-10-CM

## 2023-12-08 DIAGNOSIS — K21.9 GASTROESOPHAGEAL REFLUX DISEASE, UNSPECIFIED WHETHER ESOPHAGITIS PRESENT: ICD-10-CM

## 2023-12-08 DIAGNOSIS — K44.9 HIATAL HERNIA: Primary | ICD-10-CM

## 2023-12-08 PROCEDURE — 3017F COLORECTAL CA SCREEN DOC REV: CPT | Performed by: SURGERY

## 2023-12-08 PROCEDURE — 1036F TOBACCO NON-USER: CPT | Performed by: SURGERY

## 2023-12-08 PROCEDURE — G8417 CALC BMI ABV UP PARAM F/U: HCPCS | Performed by: SURGERY

## 2023-12-08 PROCEDURE — G8400 PT W/DXA NO RESULTS DOC: HCPCS | Performed by: SURGERY

## 2023-12-08 PROCEDURE — G8484 FLU IMMUNIZE NO ADMIN: HCPCS | Performed by: SURGERY

## 2023-12-08 PROCEDURE — 99203 OFFICE O/P NEW LOW 30 MIN: CPT | Performed by: SURGERY

## 2023-12-08 PROCEDURE — G8427 DOCREV CUR MEDS BY ELIG CLIN: HCPCS | Performed by: SURGERY

## 2023-12-08 PROCEDURE — 1090F PRES/ABSN URINE INCON ASSESS: CPT | Performed by: SURGERY

## 2023-12-08 PROCEDURE — 1123F ACP DISCUSS/DSCN MKR DOCD: CPT | Performed by: SURGERY

## 2023-12-08 RX ORDER — FAMOTIDINE 20 MG/1
20 TABLET, FILM COATED ORAL 2 TIMES DAILY
COMMUNITY

## 2023-12-10 ASSESSMENT — ENCOUNTER SYMPTOMS
ALLERGIC/IMMUNOLOGIC NEGATIVE: 1
RHINORRHEA: 0
EYE DISCHARGE: 0
RECTAL PAIN: 0
EYE REDNESS: 0
WHEEZING: 0
ABDOMINAL DISTENTION: 0
NAUSEA: 0
COUGH: 0
SORE THROAT: 0
STRIDOR: 0
EYE PAIN: 0
VOICE CHANGE: 0
CHEST TIGHTNESS: 0
PHOTOPHOBIA: 0
CONSTIPATION: 0
COLOR CHANGE: 0
APNEA: 0
SINUS PAIN: 1
ABDOMINAL PAIN: 0
ANAL BLEEDING: 0
SINUS PRESSURE: 0
EYE ITCHING: 0
BLOOD IN STOOL: 0
TROUBLE SWALLOWING: 0
VOMITING: 0
FACIAL SWELLING: 0
CHOKING: 0
DIARRHEA: 0
SHORTNESS OF BREATH: 1
BACK PAIN: 0

## 2024-01-15 RX ORDER — SODIUM CHLORIDE 0.9 % (FLUSH) 0.9 %
5-40 SYRINGE (ML) INJECTION EVERY 12 HOURS SCHEDULED
Status: DISCONTINUED | OUTPATIENT
Start: 2024-01-15 | End: 2024-01-17 | Stop reason: HOSPADM

## 2024-01-15 RX ORDER — SODIUM CHLORIDE 9 MG/ML
25 INJECTION, SOLUTION INTRAVENOUS PRN
Status: DISCONTINUED | OUTPATIENT
Start: 2024-01-15 | End: 2024-01-17 | Stop reason: HOSPADM

## 2024-01-15 RX ORDER — SODIUM CHLORIDE 9 MG/ML
INJECTION, SOLUTION INTRAVENOUS CONTINUOUS
Status: DISCONTINUED | OUTPATIENT
Start: 2024-01-15 | End: 2024-01-17 | Stop reason: HOSPADM

## 2024-01-15 RX ORDER — SODIUM CHLORIDE 0.9 % (FLUSH) 0.9 %
5-40 SYRINGE (ML) INJECTION PRN
Status: DISCONTINUED | OUTPATIENT
Start: 2024-01-15 | End: 2024-01-17 | Stop reason: HOSPADM

## 2024-01-17 ENCOUNTER — ANESTHESIA (OUTPATIENT)
Dept: ENDOSCOPY | Age: 69
End: 2024-01-17
Payer: MEDICARE

## 2024-01-17 ENCOUNTER — ANESTHESIA EVENT (OUTPATIENT)
Dept: ENDOSCOPY | Age: 69
End: 2024-01-17
Payer: MEDICARE

## 2024-01-17 ENCOUNTER — HOSPITAL ENCOUNTER (OUTPATIENT)
Age: 69
Setting detail: OUTPATIENT SURGERY
Discharge: HOME | End: 2024-01-17
Attending: INTERNAL MEDICINE | Admitting: INTERNAL MEDICINE
Payer: MEDICARE

## 2024-01-17 VITALS
HEIGHT: 68 IN | SYSTOLIC BLOOD PRESSURE: 123 MMHG | OXYGEN SATURATION: 96 % | HEART RATE: 56 BPM | BODY MASS INDEX: 38.86 KG/M2 | TEMPERATURE: 97.2 F | RESPIRATION RATE: 16 BRPM | DIASTOLIC BLOOD PRESSURE: 63 MMHG | WEIGHT: 256.4 LBS

## 2024-01-17 PROCEDURE — 6360000002 HC RX W HCPCS: Performed by: NURSE ANESTHETIST, CERTIFIED REGISTERED

## 2024-01-17 PROCEDURE — 3700000001 HC ADD 15 MINUTES (ANESTHESIA): Performed by: INTERNAL MEDICINE

## 2024-01-17 PROCEDURE — 2580000003 HC RX 258: Performed by: INTERNAL MEDICINE

## 2024-01-17 PROCEDURE — 3604323500 HC GERD TEST W/ELECTRODE (BRAVO): Performed by: INTERNAL MEDICINE

## 2024-01-17 PROCEDURE — 2500000003 HC RX 250 WO HCPCS: Performed by: NURSE ANESTHETIST, CERTIFIED REGISTERED

## 2024-01-17 PROCEDURE — 7100000011 HC PHASE II RECOVERY - ADDTL 15 MIN: Performed by: INTERNAL MEDICINE

## 2024-01-17 PROCEDURE — 3700000000 HC ANESTHESIA ATTENDED CARE: Performed by: INTERNAL MEDICINE

## 2024-01-17 PROCEDURE — 7100000010 HC PHASE II RECOVERY - FIRST 15 MIN: Performed by: INTERNAL MEDICINE

## 2024-01-17 RX ORDER — ONDANSETRON 2 MG/ML
INJECTION INTRAMUSCULAR; INTRAVENOUS PRN
Status: DISCONTINUED | OUTPATIENT
Start: 2024-01-17 | End: 2024-01-17 | Stop reason: SDUPTHER

## 2024-01-17 RX ORDER — PROPOFOL 10 MG/ML
INJECTION, EMULSION INTRAVENOUS PRN
Status: DISCONTINUED | OUTPATIENT
Start: 2024-01-17 | End: 2024-01-17 | Stop reason: SDUPTHER

## 2024-01-17 RX ORDER — FENTANYL CITRATE 50 UG/ML
INJECTION, SOLUTION INTRAMUSCULAR; INTRAVENOUS PRN
Status: DISCONTINUED | OUTPATIENT
Start: 2024-01-17 | End: 2024-01-17 | Stop reason: SDUPTHER

## 2024-01-17 RX ORDER — LIDOCAINE HYDROCHLORIDE 20 MG/ML
INJECTION, SOLUTION EPIDURAL; INFILTRATION; INTRACAUDAL; PERINEURAL PRN
Status: DISCONTINUED | OUTPATIENT
Start: 2024-01-17 | End: 2024-01-17 | Stop reason: SDUPTHER

## 2024-01-17 RX ADMIN — FENTANYL CITRATE 50 MCG: 50 INJECTION, SOLUTION INTRAMUSCULAR; INTRAVENOUS at 08:34

## 2024-01-17 RX ADMIN — ONDANSETRON 4 MG: 2 INJECTION INTRAMUSCULAR; INTRAVENOUS at 08:32

## 2024-01-17 RX ADMIN — PROPOFOL 50 MG: 10 INJECTION, EMULSION INTRAVENOUS at 08:35

## 2024-01-17 RX ADMIN — PROPOFOL 30 MG: 10 INJECTION, EMULSION INTRAVENOUS at 08:37

## 2024-01-17 RX ADMIN — LIDOCAINE HYDROCHLORIDE 100 MG: 20 INJECTION, SOLUTION EPIDURAL; INFILTRATION; INTRACAUDAL; PERINEURAL at 08:32

## 2024-01-17 RX ADMIN — FENTANYL CITRATE 50 MCG: 50 INJECTION, SOLUTION INTRAMUSCULAR; INTRAVENOUS at 08:32

## 2024-01-17 RX ADMIN — PROPOFOL 100 MG: 10 INJECTION, EMULSION INTRAVENOUS at 08:32

## 2024-01-17 RX ADMIN — SODIUM CHLORIDE: 9 INJECTION, SOLUTION INTRAVENOUS at 07:13

## 2024-01-17 ASSESSMENT — PAIN - FUNCTIONAL ASSESSMENT
PAIN_FUNCTIONAL_ASSESSMENT: 0-10
PAIN_FUNCTIONAL_ASSESSMENT: NONE - DENIES PAIN
PAIN_FUNCTIONAL_ASSESSMENT: NONE - DENIES PAIN

## 2024-01-17 ASSESSMENT — ENCOUNTER SYMPTOMS: SHORTNESS OF BREATH: 1

## 2024-01-17 NOTE — OP NOTE
[address] [address]                                OPERATIVE REPORT    PATIENT NAME: ZEN GÓMEZ                       :        1955  MED REC NO:   [id number]                           ROOM:  ACCOUNT NO:   [id number]                           ADMIT DATE: 2024  PROVIDER:     Suraj Modi M.D.    DATE OF PROCEDURE:  2024    SURGEON:  Suraj Modi MD    ASA CLASSIFICATION:  See anesthesia note.    ESTIMATED BLOOD LOSS:  None.    PROCEDURE PERFORMED:  EGD with Bravo capsule insertion or placement.    INDICATIONS:  The patient with history of gastric reflux.  Future plan  for possible fundoplication or surgical management of chronic gastric  reflux.  Plan today for EGD to evaluate, possible biopsy as well as  Bravo capsule insertion for 48 hour pH monitoring.    DESCRIPTION OF THE PROCEDURE:  The patient was brought to the GI lab.   Consent was obtained.  Risks involved with the procedure were explained  to the patient.  Informed consent was obtained.  The patient was  monitored during the procedure with pulse oximetry, blood pressure  monitoring, and oxygen by nasal cannula.  Sedation by incremental doses  of IV propofol given by the Anesthesia service to achieve monitored  anesthesia care.  For ASA classification and medication given during the  procedure, please see Anesthesia note.    The patient was put in left decubitus position.  Mouthpiece was put to  keep the oral cavity open.  Upper scope advanced without difficulty up  to the duodenum.  The GE junction was at 35 cm from incisors with grade  1 esophagitis in the distal esophagus with the stomach retroflexed.   Examination of the cardia revealed small hiatus hernia.  Antrum and body  appears normal.  Pylorus appears normal.  Duodenum appears normal.   Scope withdrawn.  Then Bravo capsule was inserted at 29 cm, 6 cm from  the GE junction.  Scope advanced

## 2024-01-17 NOTE — ANESTHESIA POSTPROCEDURE EVALUATION
Department of Anesthesiology  Postprocedure Note    Patient: Alesha Ndiaye  MRN: [id number]  YOB: 1955  Date of evaluation: 1/17/2024    Procedure Summary       Date: 01/17/24 Room / Location: [place] [address] / [facility]    Anesthesia Start: 0828 Anesthesia Stop: 0842    Procedure: EGD Bravo Diagnosis:       Pre-op evaluation      Gastroesophageal reflux disease, unspecified whether esophagitis present      (Pre-op evaluation [Z01.818])      (Gastroesophageal reflux disease, unspecified whether esophagitis present [K21.9])    Surgeons: Suraj Modi MD Responsible Provider: Bj Zapata MD    Anesthesia Type: MAC ASA Status: Not recorded            Anesthesia Type: No value filed.    Hugo Phase I: Hugo Score: 10    Hugo Phase II: Hugo Score: 10    Anesthesia Post Evaluation    Patient location during evaluation: bedside  Patient participation: complete - patient participated  Level of consciousness: awake and alert  Pain score: 0  Airway patency: patent  Nausea & Vomiting: no nausea and no vomiting  Cardiovascular status: hemodynamically stable  Respiratory status: acceptable  Hydration status: euvolemic  Pain management: adequate        No notable events documented.

## 2024-01-17 NOTE — BRIEF OP NOTE
Brief Postoperative Note      Patient: Alesha Ndiaye  YOB: 1955  MRN: [id number]    Date of Procedure: 1/17/2024    Pre-Op Diagnosis Codes:     * Pre-op evaluation [Z01.818]     * Gastroesophageal reflux disease, unspecified whether esophagitis present [K21.9]    Post-Op Diagnosis:  GERD, plan for  Bravo capsule insertion , hiatal hernia and esophagitis        Procedure(s):  EGD Bravo    Surgeon(s):  Suraj Modi MD    Assistant:  * No surgical staff found *    Anesthesia: Monitor Anesthesia Care    Estimated Blood Loss (mL): none    Complications: None    Specimens:   * No specimens in log *    Implants:  * No implants in log *      Drains: * No LDAs found *    Findings:  GERD, plan for  Bravo capsule insertion , hiatal hernia and esophagitis       Electronically signed by Suraj Modi MD on 1/17/2024 at 8:41 AM

## 2024-01-17 NOTE — PROGRESS NOTES
Recovery mode. Denies discomfort, passing gas, taking fluids. Dr. Modi discussed findings and plan of care with patient and . Discharge instructions provided, understanding verbalized.

## 2024-01-17 NOTE — ANESTHESIA PRE PROCEDURE
Department of Anesthesiology  Preprocedure Note       Name:  Alesha Ndiaye   Age:  68 y.o.  :  1955                                          MRN:  [id number]         Date:  2024      Surgeon: Surgeon(s):  Suraj Modi MD    Procedure: Procedure(s):  EGD Bravo    Medications prior to admission:   Prior to Admission medications    Medication Sig Start Date End Date Taking? Authorizing Provider   famotidine (PEPCID) 20 MG tablet Take 1 tablet by mouth 2 times daily    Penny Lindo MD   NP THYROID 30 MG tablet TAKE 2 TABS BY MOUTH DAILY ON MONDAY, WEDNESDAY, & FRIDAY AND 1 & 1/2 TABS DAILY ON TUESDAY, THURSDAY, SATURDAY, & SANDEEP 10/21/22   Jun Marie MD   acetaminophen (TYLENOL) 500 MG tablet Take 1 tablet by mouth every 8 hours as needed for Pain 22   Kell Cade DO   metoprolol succinate (TOPROL XL) 25 MG extended release tablet TAKE 1 TABLET BY MOUTH EVERY DAY 22   Penny Lindo MD   esomeprazole (NEXIUM) 40 MG delayed release capsule TAKE 1 CAPSULE BY MOUTH IN THE MORNING 22   Penny Lindo MD   NONFORMULARY UTI clear 1 5 mL dropper full daily  Patient not taking: Reported on 3/27/2023    Penny Lindo MD   Ascorbic Acid (JOELLE-C PO) Take by mouth  Patient not taking: Reported on 2024    Penny Lindo MD   TURMERIC PO Take by mouth    Penny Lindo MD   Zinc Sulfate (ZINC 15 PO) Take 30 mg by mouth    Penny Lindo MD   D-MANNOSE PO Take 1,500 mg by mouth daily    Penny Lindo MD   MAGNESIUM PO Take 1 tablet by mouth every morning     Penny Lindo MD   Omega-3 Fatty Acids (FISH OIL PO) Take 1 capsule by mouth every morning     Penny Lindo MD   Potassium 99 MG TABS Take 1 tablet by mouth daily    Penny Lindo MD   loratadine (CLARITIN) 10 MG tablet Take 1 tablet by mouth daily  Patient not taking: Reported on 2024   Janki Betancourt, APRN - CNP   Cholecalciferol (VITAMIN

## 2024-01-17 NOTE — PROGRESS NOTES
EGD completed.. Bravo completed. SCJ located at 35 cm. Bravo capsule placed at 29 cm. Patient tolerated well. Photos taken.    Scope  used.

## 2024-01-17 NOTE — ADDENDUM NOTE
Addendum  created 01/17/24 1131 by Albert Ambrosio APRN - CRNA    Intraprocedure Meds edited, Orders acknowledged in Narrator

## 2024-01-17 NOTE — H&P
[facility]     Sedation/Analgesia History & Physical    Patient: Alesha Ndiaye : 1955  Med Rec#: [id number] Acc#: [id number]   Provider Performing Procedure: Suraj Modi MD  Primary Care Physician: No primary care provider on file.    PRE-PROCEDURE   Full CODE [x]Yes  DNR-CCA/DNR-CC []Yes   Brief History/Pre-Procedure Diagnosis:    1. Pre-op evaluation  ESOPHAGOSCOPY / EGD    Esophageal PH 48 Hour     2. Hiatal hernia with GERD  ESOPHAGOSCOPY / EGD    Esophageal PH 48 Hour     3. Long-term current use of proton pump inhibitor therapy  ESOPHAGOSCOPY / EGD    Esophageal PH 48 Hour        At this time, we will proceed with EGD with Bravo pH recording.  The procedure was explained to her including the need to hold acid reducers for a week prior and during the evaluation.  Patient was encouraged to be strict with her antireflux measures and avoiding known triggers for acid reflux during that timeframe.  The procedure was explained to her, including risks and benefits, and time taken to answer her questions.  She is willing to proceed.  Further treatment recommendations will be determined by the EGD findings.            MEDICAL HISTORY    []Additional information:       has a past medical history of Acid reflux, Arthritis, Cancer (HCC), Cancer (HCC), Cataracts, bilateral, Constipation, Depression, Epilepsy (HCC), Fibromyalgia, Hearing loss, Hiatal hernia, History of Luis-Barr virus infection, Hoarseness, Hypertension, Hypothyroidism, Mono exposure, Night sweats, Osteoarthritis, Sinus infection, SOB (shortness of breath), and UTI (urinary tract infection).    SOCIAL HISTORY  Social History       Tobacco History       Smoking Status  Former Smoking Start Date  1986 Quit Date  2015 Average Packs/Day  0.5 packs/day for 29.0 years (14.5 ttl pk-yrs) Smoking Tobacco Type  Cigarettes from 1986 to 2015   Pack Year History     Packs/Day From To Years    0 2015  8.6    0.5

## 2024-01-26 ENCOUNTER — OFFICE VISIT (OUTPATIENT)
Dept: BARIATRICS/WEIGHT MGMT | Age: 69
End: 2024-01-26

## 2024-01-26 VITALS
SYSTOLIC BLOOD PRESSURE: 134 MMHG | TEMPERATURE: 97.3 F | HEART RATE: 64 BPM | BODY MASS INDEX: 38.49 KG/M2 | DIASTOLIC BLOOD PRESSURE: 86 MMHG | HEIGHT: 68 IN | WEIGHT: 254 LBS

## 2024-01-26 DIAGNOSIS — K44.9 HIATAL HERNIA: Primary | ICD-10-CM

## 2024-01-26 DIAGNOSIS — K44.9 HIATAL HERNIA: ICD-10-CM

## 2024-01-26 DIAGNOSIS — K21.9 GASTROESOPHAGEAL REFLUX DISEASE, UNSPECIFIED WHETHER ESOPHAGITIS PRESENT: ICD-10-CM

## 2024-01-26 DIAGNOSIS — Z01.818 PREOP EXAMINATION: Primary | ICD-10-CM

## 2024-01-26 RX ORDER — BIOTIN 10000 MCG
CAPSULE ORAL
COMMUNITY

## 2024-01-26 ASSESSMENT — ENCOUNTER SYMPTOMS
BLOOD IN STOOL: 0
ABDOMINAL PAIN: 0
BACK PAIN: 1
EYE ITCHING: 0
SINUS PRESSURE: 1
NAUSEA: 0
EYE DISCHARGE: 0
SHORTNESS OF BREATH: 1
STRIDOR: 0
RHINORRHEA: 0
EYE PAIN: 0
ANAL BLEEDING: 0
VOICE CHANGE: 0
TROUBLE SWALLOWING: 0
ABDOMINAL DISTENTION: 0
COUGH: 0
SINUS PAIN: 1
EYE REDNESS: 0
RECTAL PAIN: 0
CHOKING: 0
DIARRHEA: 0
COLOR CHANGE: 0
APNEA: 0
FACIAL SWELLING: 0
SORE THROAT: 0
VOMITING: 0
CONSTIPATION: 1
CHEST TIGHTNESS: 0

## 2024-01-26 NOTE — PROGRESS NOTES
Alesha Ndiaye (:  1955)     ASSESSMENT:  1.  GERD  2.  Hiatal hernia  3.  Obesity (BMI 38)  4.  Youngblood's esophagus    PLAN:  1. Schedule Alesha for robotic possible open repair hiatal hernia with magnetic sphincter augmentation insertion (Linx).  2. She will undergo pre-operative clearance per anesthesia guidelines with risk factors listed under the past medical history diagnosis & problem list.  3. The risks, benefits and alternatives were discussed with Alesha including non-operative management.  The pros and cons of robotic, laparoscopic and open techniques were discussed.  All questions answered. She understands and wishes to proceed with surgical intervention.  4. Restrictions discussed with Alesha and she expresses understanding.  5. She is advised to call back directly if there are further questions/concerns, or if her symptoms worsen prior to surgery.  6.  Encouraged patient to continue to lose weight.  Nutritional education occurred today for both GERD and hiatal hernia related issues as well as obesity.  All questions answered.  7.  Continue PPI/antacid regimen as directed  8.  Follow-up with GI service as directed  9.  Dietary modification/restrictions discussed with patient in regards to GERD and hiatal hernia.  All questions answered.    SUBJECTIVE/OBJECTIVE:    Chief Complaint   Patient presents with    Follow-up     HB testing follow up     HPI  Alesha is a 68-year-old female who presents for follow-up at the GERD clinic.  Esophagram was within normal limits with small hiatal hernia.  pH Bravo obtained and demonstrated DeMeester score 37.4.  Patient with a known hiatal hernia and history of Youngblood's esophagus.  Currently taking her Nexium daily.  She feels she has already maximized dietary and lifestyle modifications with no real success.  BMI 38.  Current weight 254 pounds.  She has been able to lose 8 pounds since last visit.  Denies current chest or abdominal pain.  No hematochezia or melena.  No

## 2024-02-05 ENCOUNTER — HOSPITAL ENCOUNTER (OUTPATIENT)
Age: 69
Discharge: HOME | End: 2024-02-05
Payer: MEDICARE

## 2024-02-05 DIAGNOSIS — Z01.818 PREOP EXAMINATION: ICD-10-CM

## 2024-02-05 DIAGNOSIS — K44.9 HIATAL HERNIA: ICD-10-CM

## 2024-02-05 LAB
ANION GAP SERPL CALC-SCNC: 10 MEQ/L (ref 8–16)
BASOPHILS ABSOLUTE: 0.1 THOU/MM3 (ref 0–0.1)
BASOPHILS NFR BLD AUTO: 1.4 %
BUN SERPL-MCNC: 12 MG/DL (ref 7–22)
CALCIUM SERPL-MCNC: 9.5 MG/DL (ref 8.5–10.5)
CHLORIDE SERPL-SCNC: 105 MEQ/L (ref 98–111)
CO2 SERPL-SCNC: 27 MEQ/L (ref 23–33)
CREAT SERPL-MCNC: 0.7 MG/DL (ref 0.4–1.2)
DEPRECATED RDW RBC AUTO: 48 FL (ref 35–45)
EKG Q-T INTERVAL: 410 MS
EKG QRS DURATION: 92 MS
EKG QTC CALCULATION (BAZETT): 419 MS
EKG R AXIS: -19 DEGREES
EKG T AXIS: -17 DEGREES
EKG VENTRICULAR RATE: 63 BPM
EOSINOPHIL NFR BLD AUTO: 4.1 %
EOSINOPHILS ABSOLUTE: 0.2 THOU/MM3 (ref 0–0.4)
ERYTHROCYTE [DISTWIDTH] IN BLOOD BY AUTOMATED COUNT: 13.6 % (ref 11.5–14.5)
GFR SERPL CREATININE-BSD FRML MDRD: > 60 ML/MIN/1.73M2
GLUCOSE SERPL-MCNC: 123 MG/DL (ref 70–108)
HCT VFR BLD AUTO: 46.3 % (ref 37–47)
HGB BLD-MCNC: 14.6 GM/DL (ref 12–16)
IMM GRANULOCYTES # BLD AUTO: 0.02 THOU/MM3 (ref 0–0.07)
IMM GRANULOCYTES NFR BLD AUTO: 0.4 %
LYMPHOCYTES ABSOLUTE: 1.9 THOU/MM3 (ref 1–4.8)
LYMPHOCYTES NFR BLD AUTO: 36.4 %
MCH RBC QN AUTO: 30.1 PG (ref 26–33)
MCHC RBC AUTO-ENTMCNC: 31.5 GM/DL (ref 32.2–35.5)
MCV RBC AUTO: 95.5 FL (ref 81–99)
MONOCYTES ABSOLUTE: 0.4 THOU/MM3 (ref 0.4–1.3)
MONOCYTES NFR BLD AUTO: 8.1 %
NEUTROPHILS NFR BLD AUTO: 49.6 %
NRBC BLD AUTO-RTO: 0 /100 WBC
PLATELET # BLD AUTO: 256 THOU/MM3 (ref 130–400)
PMV BLD AUTO: 10.8 FL (ref 9.4–12.4)
POTASSIUM SERPL-SCNC: 4.2 MEQ/L (ref 3.5–5.2)
RBC # BLD AUTO: 4.85 MILL/MM3 (ref 4.2–5.4)
SEGMENTED NEUTROPHILS ABSOLUTE COUNT: 2.6 THOU/MM3 (ref 1.8–7.7)
SODIUM SERPL-SCNC: 142 MEQ/L (ref 135–145)
WBC # BLD AUTO: 5.2 THOU/MM3 (ref 4.8–10.8)

## 2024-02-05 PROCEDURE — 80048 BASIC METABOLIC PNL TOTAL CA: CPT

## 2024-02-05 PROCEDURE — 93005 ELECTROCARDIOGRAM TRACING: CPT

## 2024-02-05 PROCEDURE — 36415 COLL VENOUS BLD VENIPUNCTURE: CPT

## 2024-02-05 PROCEDURE — 93010 ELECTROCARDIOGRAM REPORT: CPT | Performed by: INTERNAL MEDICINE

## 2024-02-05 PROCEDURE — 85025 COMPLETE CBC W/AUTO DIFF WBC: CPT

## 2024-02-12 ENCOUNTER — OFFICE VISIT (OUTPATIENT)
Dept: BARIATRICS/WEIGHT MGMT | Age: 69
End: 2024-02-12

## 2024-02-12 DIAGNOSIS — K21.9 GASTROESOPHAGEAL REFLUX DISEASE, UNSPECIFIED WHETHER ESOPHAGITIS PRESENT: Primary | ICD-10-CM

## 2024-02-12 NOTE — PROGRESS NOTES
Nutrition Education Completed with patient today for planned LINX procedure.  Education Included:  1. Written list of foods recommended and foods to avoid with sample menus and shopping list given to patient.  2.  Importance of small frequent meals -  minimum of 5-6 meals per day to aid in exercising the LINX  3.  Avoid all carbonated beverages or taking large gulps/amounts of drinks at one time to minimize tightness or discomfort.  May have improved tolerance of room temperature/warm fluids vs cold fluids  4.  To begin soft foods the day of surgery to active the LINX right away. Soft foods will allow the LINX to open and close with the passing of food, and help to prevent scar tissue from forming.  Advised NOT to consume only liquids or pureed foods.     Patient will have one week, four weeks, three months and one year post op visit in [facility] office with PA or MD.  May call dietitian number for any additional questions.     Rosalva Angel RD, LD   Dietitian- [facility]  Heart Burn [facility]

## 2024-02-12 NOTE — PROGRESS NOTES
Alesha Ndiaye was seen today for pre-operative teaching for LINX.  She   was educated today on surgery procedure, possible complications, risks and benefits associated with LINX and instructed to remain NPO after midnight the night before surgery, or risk cancellation of surgical procedure.  Importance of recognizing signs and symptoms of potential complication were discussed as well as when to contact the physician.  We discussed the flow of events on the day of surgery.  Discussed dysphagia in detail and that this is normal and expected after LINX and may last up to 3 months post-op- may require prescription steroid or dilation. Reinforced importance of eating and returning to a normal diet as soon as tolerated after surgery to ensure proper healing at the implant site. Also discussed the possibility of esophageal spasms, pooling, belching and hiccups and the best ways to manage these potential issues. Will schedule 1 week, 1 month, 3 month and 1 year post-op follow up visit.

## 2024-02-14 ENCOUNTER — PREP FOR PROCEDURE (OUTPATIENT)
Dept: BARIATRICS/WEIGHT MGMT | Age: 69
End: 2024-02-14

## 2024-02-14 RX ORDER — SODIUM CHLORIDE 9 MG/ML
INJECTION, SOLUTION INTRAVENOUS PRN
Status: CANCELLED | OUTPATIENT
Start: 2024-02-26

## 2024-02-14 RX ORDER — SODIUM CHLORIDE 9 MG/ML
INJECTION, SOLUTION INTRAVENOUS CONTINUOUS
Status: CANCELLED | OUTPATIENT
Start: 2024-02-26

## 2024-02-14 RX ORDER — DEXAMETHASONE SODIUM PHOSPHATE 10 MG/ML
10 INJECTION, SOLUTION INTRAMUSCULAR; INTRAVENOUS ONCE
Status: CANCELLED | OUTPATIENT
Start: 2024-02-26 | End: 2024-02-26

## 2024-02-14 RX ORDER — SODIUM CHLORIDE 0.9 % (FLUSH) 0.9 %
5-40 SYRINGE (ML) INJECTION PRN
Status: CANCELLED | OUTPATIENT
Start: 2024-02-26

## 2024-02-14 RX ORDER — SODIUM CHLORIDE 0.9 % (FLUSH) 0.9 %
5-40 SYRINGE (ML) INJECTION EVERY 12 HOURS SCHEDULED
Status: CANCELLED | OUTPATIENT
Start: 2024-02-26

## 2024-02-16 ENCOUNTER — TELEPHONE (OUTPATIENT)
Dept: BARIATRICS/WEIGHT MGMT | Age: 69
End: 2024-02-16

## 2024-02-16 NOTE — TELEPHONE ENCOUNTER
Has Dr. Ware's office called you yet?  No- have not heard from him - she will call his office on Monday to schedule.    PAT called me and said she doesn't have anyone to stay with her 1st 24 hrs post surgery - I did ask her about this -she isn't on good terms with her child - not an option.  She said no friends or relatives that live in [place].

## 2024-02-19 ENCOUNTER — TELEPHONE (OUTPATIENT)
Dept: BARIATRICS/WEIGHT MGMT | Age: 69
End: 2024-02-19

## 2024-02-19 NOTE — TELEPHONE ENCOUNTER
Alesha Kirkland has decided to cancel her surgery scheduled for 2/26/2024- things at work are crazy at this time -I will call you back when I know what's going on.  She has not heard from Dr. Ware's office as of today. I urged her schedule an appt with him if she still wants to have surgery -pt voiced understanding.

## 2024-06-24 ENCOUNTER — OFFICE VISIT (OUTPATIENT)
Age: 69
End: 2024-06-24
Payer: MEDICARE

## 2024-06-24 VITALS
SYSTOLIC BLOOD PRESSURE: 118 MMHG | BODY MASS INDEX: 38.46 KG/M2 | HEIGHT: 68 IN | WEIGHT: 253.8 LBS | RESPIRATION RATE: 18 BRPM | DIASTOLIC BLOOD PRESSURE: 78 MMHG | HEART RATE: 64 BPM

## 2024-06-24 DIAGNOSIS — Z13.1 DIABETES MELLITUS SCREENING: ICD-10-CM

## 2024-06-24 DIAGNOSIS — E53.8 B12 DEFICIENCY: ICD-10-CM

## 2024-06-24 DIAGNOSIS — E03.9 ACQUIRED HYPOTHYROIDISM: Primary | ICD-10-CM

## 2024-06-24 DIAGNOSIS — E27.9 ADRENAL DISORDER (HCC): ICD-10-CM

## 2024-06-24 PROCEDURE — 1123F ACP DISCUSS/DSCN MKR DOCD: CPT | Performed by: INTERNAL MEDICINE

## 2024-06-24 PROCEDURE — 1036F TOBACCO NON-USER: CPT | Performed by: INTERNAL MEDICINE

## 2024-06-24 PROCEDURE — 1090F PRES/ABSN URINE INCON ASSESS: CPT | Performed by: INTERNAL MEDICINE

## 2024-06-24 PROCEDURE — 3017F COLORECTAL CA SCREEN DOC REV: CPT | Performed by: INTERNAL MEDICINE

## 2024-06-24 PROCEDURE — 99214 OFFICE O/P EST MOD 30 MIN: CPT | Performed by: INTERNAL MEDICINE

## 2024-06-24 PROCEDURE — G8417 CALC BMI ABV UP PARAM F/U: HCPCS | Performed by: INTERNAL MEDICINE

## 2024-06-24 PROCEDURE — G8427 DOCREV CUR MEDS BY ELIG CLIN: HCPCS | Performed by: INTERNAL MEDICINE

## 2024-06-24 PROCEDURE — G8400 PT W/DXA NO RESULTS DOC: HCPCS | Performed by: INTERNAL MEDICINE

## 2024-06-24 NOTE — PROGRESS NOTES
[facility] [place] [facility]  [facility] ENDOCRINOLOGY  [address]  [facility] [place] [address]  Dept: [phone]  Loc: [phone]     Visit Date: 6/24/2024    Alesha Ndiaye is a 68 y.o. female who presents today for:  Chief Complaint   Patient presents with    Follow-up     Acquired hypothyroidism            Subjective:      HPI   Alesha Ndiaye is a 68 y.o. , female who comes for f/u for hypothyroidism .  This patient was last seen here in January 2023.  The patient also has B12 deficiency.  she was diagnosed with hypothyroidism 21 years ago.   Etiology of hypothyroidism is hashimoto's disease.. Current therapy includes NP thyroid 60 mg daily.  The patient had normal thyroid labs in February of this year.  The patient is also asking me to test her adrenal gland.  She had some saliva tests which she wants followed up.  Her complaints include cold intolerance, constipation, dry skin, brittle hair, depression, fatigue, memory problems and weight gain.  She also tells me that she craves salt.   Today she also wants me to check of her blood sugar.  The patient states both parents were diabetic and she has not been checked for diabetes.  She is still taking B12.  Past Medical History:   Diagnosis Date    Acid reflux     Arthritis     osteo -   knees    Cancer (HCC) 1985???    cervical   cryo surgery    Cancer (HCC)     skin-basal cell    Cataracts, bilateral     Constipation     Depression     Epilepsy (HCC)     last age 21 yrs    Fibromyalgia     Hearing loss     bialteral ears right is almost deaf and has hearing aid left    Hiatal hernia     History of Luis-Barr virus infection     Hoarseness     Hypertension     Hypothyroidism     Mono exposure     Night sweats     Osteoarthritis     Sinus infection     SOB (shortness of breath)     UTI (urinary tract infection) 05/30/2018      Past Surgical History:   Procedure Laterality Date    CARDIAC CATHETERIZATION  2021    no stents; had one with

## 2024-06-26 LAB
ESTIMATED AVERAGE GLUCOSE: 105
FOLATE: ABNORMAL
HBA1C MFR BLD: 5.3 %
T3 FREE: 2.5
T4 FREE: 1.06
TSH SERPL DL<=0.05 MIU/L-ACNC: 3.06 UIU/ML
VITAMIN B-12: 2385

## 2024-07-09 ENCOUNTER — HOSPITAL ENCOUNTER (OUTPATIENT)
Dept: NURSING | Age: 69
Discharge: HOME | End: 2024-07-09

## 2024-07-09 RX ORDER — COSYNTROPIN 0.25 MG/ML
0.25 INJECTION, POWDER, FOR SOLUTION INTRAMUSCULAR; INTRAVENOUS ONCE
Status: DISCONTINUED | OUTPATIENT
Start: 2024-07-09 | End: 2024-07-09

## 2024-07-18 ENCOUNTER — HOSPITAL ENCOUNTER (OUTPATIENT)
Dept: NURSING | Age: 69
Discharge: HOME | End: 2024-07-18
Payer: MEDICARE

## 2024-07-18 VITALS
SYSTOLIC BLOOD PRESSURE: 139 MMHG | HEART RATE: 71 BPM | DIASTOLIC BLOOD PRESSURE: 77 MMHG | OXYGEN SATURATION: 98 % | TEMPERATURE: 98.2 F | RESPIRATION RATE: 18 BRPM

## 2024-07-18 LAB
CORTIS SERPL-MCNC: 14.67 UG/DL
CORTIS SERPL-MCNC: 18.49 UG/DL
CORTIS SERPL-MCNC: 24.67 UG/DL
CORTISOL COLLECTION INFO: NORMAL

## 2024-07-18 PROCEDURE — 99213 OFFICE O/P EST LOW 20 MIN: CPT

## 2024-07-18 PROCEDURE — 6360000002 HC RX W HCPCS: Performed by: INTERNAL MEDICINE

## 2024-07-18 PROCEDURE — 96374 THER/PROPH/DIAG INJ IV PUSH: CPT

## 2024-07-18 PROCEDURE — 82533 TOTAL CORTISOL: CPT

## 2024-07-18 PROCEDURE — 82024 ASSAY OF ACTH: CPT

## 2024-07-18 RX ORDER — COSYNTROPIN 0.25 MG/ML
0.25 INJECTION, POWDER, FOR SOLUTION INTRAMUSCULAR; INTRAVENOUS ONCE
Status: COMPLETED | OUTPATIENT
Start: 2024-07-18 | End: 2024-07-18

## 2024-07-18 RX ADMIN — COSYNTROPIN 0.25 MG: 0.25 INJECTION, POWDER, LYOPHILIZED, FOR SOLUTION INTRAMUSCULAR; INTRAVENOUS at 07:41

## 2024-07-18 ASSESSMENT — PAIN SCALES - GENERAL: PAINLEVEL_OUTOF10: 0

## 2024-07-18 NOTE — PROGRESS NOTES
0730  Alesha Kirkland ambulated into room for ACTH stim test. Pt rights and responsibilities offered to her. Baseline labs drawn    0741  cortrosyn given    0814  30 min post labs drawn    0848  60 min post labs drawn    0910   D/c instructions explained and Alesha Kirkland verbalized understanding. Alesha Kirkland ambulated out per self for d/c today.           _m___ Safety:       (Environmental)  [place] to environment  Ensure ID band is correct and in place/ allergy band as needed  Assess for fall risk  Initiate fall precautions as applicable (fall band, side rails, etc.)  Call light within reach  Bed in low position/ wheels locked    m____ Pain:       Assess pain level and characteristics  Administer analgesics as ordered  Assess effectiveness of pain management and report to MD as needed    _m___ Knowledge Deficit:  Assess baseline knowledge  Provide teaching at level of understanding  Provide teaching via preferred learning method  Evaluate teaching effectiveness    m____ Hemodynamic/Respiratory Status:       (Pre and Post Procedure Monitoring)  Assess/Monitor vital signs and LOC  Assess Baseline SpO2 prior to any sedation  Obtain weight/height  Assess vital signs/ LOC until patient meets discharge criteria  Monitor procedure site and notify MD of any issues

## 2024-07-20 LAB — ACTH PLAS-MCNC: 29.2 PG/ML (ref 7.2–63.3)

## 2024-07-25 ENCOUNTER — HOSPITAL ENCOUNTER (OUTPATIENT)
Dept: OCCUPATIONAL THERAPY | Age: 69
Setting detail: THERAPIES SERIES
Discharge: HOME | End: 2024-07-25
Payer: MEDICARE

## 2024-07-25 PROCEDURE — 97165 OT EVAL LOW COMPLEX 30 MIN: CPT

## 2024-07-25 NOTE — PROGRESS NOTES
** PLEASE SIGN, DATE AND TIME CERTIFICATION BELOW AND RETURN TO [facility] OUTPATIENT REHABILITATION (FAX #: [phone]).  ATTEST/CO-SIGN IF ACCESSING VIA [employer].  THANK YOU.**    I certify that I have examined the patient below and determined that Physical Medicine and Rehabilitation service is necessary and that I approve the established plan of care for up to 90 days or as specifically noted.  Attestation, signature or co-signature of physician indicates approval of certification requirements.    ________________________ ____________ __________  Physician Signature   Date   Time   [facility]  OCCUPATIONAL THERAPY  [x] EVALUATION  [] DAILY NOTE (LAND) [] DAILY NOTE (AQUATIC ) [] PROGRESS NOTE [] DISCHARGE NOTE    [x] OUTPATIENT REHABILITATION [facility]   [] [facility]    [] [facility]   [] [facility]    Date: 2024  Patient Name:  Alesha Ndiaye  : 1955  MRN: [id number]  CSN: [id number]    Referring Practitioner Ayad Marroquin MD      Diagnosis Displaced fracture of right ulna styloid process, subsequent encounter for closed fracture with routine healing S52.611D  Other fractures of lower end of right radius, subsequent encounter for closed fracture with routine healing S52.591D   Treatment Diagnosis M79.601  Right Arm Pain  M25.531  Right Wrist Pain  M25.631 Stiffness of Right Wrist  R53.1 Weakness   Date of Evaluation 24    Additional Pertinent History Alesha Ndiaye has a past medical history of Acid reflux, Arthritis, Cancer (HCC), Cancer (HCC), Cataracts, bilateral, Constipation, Depression, Epilepsy (HCC), Fibromyalgia, Hearing loss, Hiatal hernia, History of Luis-Barr virus infection, Hoarseness, Hypertension, Hypothyroidism, Mono exposure, Night sweats, Osteoarthritis, Sinus infection, SOB (shortness of breath), and UTI (urinary tract infection).      Functional Outcome Measure Used UEFS   Functional Outcome Score  (24)

## 2024-07-30 ENCOUNTER — HOSPITAL ENCOUNTER (OUTPATIENT)
Dept: OCCUPATIONAL THERAPY | Age: 69
Setting detail: THERAPIES SERIES
Discharge: HOME | End: 2024-07-30
Payer: MEDICARE

## 2024-07-30 PROCEDURE — 97022 WHIRLPOOL THERAPY: CPT

## 2024-07-30 PROCEDURE — 97530 THERAPEUTIC ACTIVITIES: CPT

## 2024-07-30 NOTE — PROGRESS NOTES
[facility]  OCCUPATIONAL THERAPY  [] EVALUATION  [] DAILY NOTE (LAND) [] DAILY NOTE (AQUATIC ) [] PROGRESS NOTE [] DISCHARGE NOTE    [x] OUTPATIENT REHABILITATION CENTER [facility]   [] [place] [facility] [place]    [] [facility]   [] [facility]    Date: 2024  Patient Name:  Alesha Ndiaye  : 1955  MRN: [id number]  CSN: [id number]    Referring Practitioner Ayad Marroquin MD      Diagnosis Displaced fracture of right ulna styloid process, subsequent encounter for closed fracture with routine healing S52.611D  Other fractures of lower end of right radius, subsequent encounter for closed fracture with routine healing S52.591D   Treatment Diagnosis M79.601  Right Arm Pain  M25.531  Right Wrist Pain  M25.631 Stiffness of Right Wrist  R53.1 Weakness   Date of Evaluation 24    Additional Pertinent History Alesha Ndiaye has a past medical history of Acid reflux, Arthritis, Cancer (HCC), Cancer (HCC), Cataracts, bilateral, Constipation, Depression, Epilepsy (HCC), Fibromyalgia, Hearing loss, Hiatal hernia, History of Luis-Barr virus infection, Hoarseness, Hypertension, Hypothyroidism, Mono exposure, Night sweats, Osteoarthritis, Sinus infection, SOB (shortness of breath), and UTI (urinary tract infection).      Functional Outcome Measure Used UEFS   Functional Outcome Score  (24) 32/80       Insurance: Primary: Payor: MEDICARE /  /  / ,   Secondary: AETNA   Authorization Information: Patient has unlimited visits based on medical necessity  Benefit will not cover maintenance or preventative treatment.  AQUATIC THERAPY COVERED:   Yes  MODALITIES COVERED:  Yes, with the exception of iontophoresis and hot packs/cold packs  TELEHEALTH COVERED: Yes   Approved Procedure Codes: Authorization of Specific CPT Codes Not Required  (Codes requested indicated by red font, codes approved indicated by black font)   Visit # 2, 2/10 for progress note (Reporting Period:

## 2024-08-02 ENCOUNTER — HOSPITAL ENCOUNTER (OUTPATIENT)
Dept: OCCUPATIONAL THERAPY | Age: 69
Setting detail: THERAPIES SERIES
Discharge: HOME | End: 2024-08-02
Payer: MEDICARE

## 2024-08-02 PROCEDURE — 97140 MANUAL THERAPY 1/> REGIONS: CPT

## 2024-08-02 PROCEDURE — 97018 PARAFFIN BATH THERAPY: CPT

## 2024-08-02 PROCEDURE — 97110 THERAPEUTIC EXERCISES: CPT

## 2024-08-02 NOTE — PROGRESS NOTES
Code for HEP: Access Code: [email or web address]  URL: [email or web address]  Date: 07/25/2024  Prepared by: Miguel Castro    Exercises  - Seated Forearm Pronation and Supination AROM  - 3 x daily - 7 x weekly - 1 sets - 10 reps  - Wrist AROM Flexion Extension  - 3 x daily - 7 x weekly - 1 sets - 10 reps  - Wrist AROM Radial Ulnar Deviation  - 3 x daily - 7 x weekly - 1 sets - 10 reps  - Wrist Flexion Extension AROM - Palms Down  - 3 x daily - 7 x weekly - 1 sets - 10 reps  - Seated Wrist Flexion Stretch  - 3 x daily - 7 x weekly - 1 sets - 10 reps - 5 hold  - Wrist Extension Stretch Pronated  - 3 x daily - 7 x weekly - 1 sets - 10 reps - 5 hold  - Wrist Prayer Stretch  - 2 x daily - 7 x weekly - 1 sets - 5 reps - 10 hold  7/30/24: education on trigger finger, with splint for finger to prevent popping; modalities as needed at home for pain  8/2/24 issued dense foam block for pt. To squeeze and pinch at home  []  No new Education completed  [x]  Reviewed Prior HEP and added gripping foam block     [x]  Patient verbalized and/or demonstrated understanding of education provided.  []  Patient unable to verbalize and/or demonstrate understanding of education provided.  Will continue education.  [x]  Barriers to learning: hard of hearing    PLAN:  Treatment Recommendations: Strengthening, Range of Motion, Manual Therapy - Soft Tissue Mobilization, Manual Therapy - Joint Manipulation, Home Exercise Program, Patient Education, and Modalities    []  Plan of care initiated.   [x]  Continue with current plan of care. Plan to see patient 2 times per week for 12 weeks to address the treatment planned outlined above.  []  Modify plan of care as follows:    []  Hold pending physician visit  []  Discharge    Time In 0715   Time Out 0758   Timed Code Minutes: 28 min   Total Treatment Time: 43 min       Dari Spencer OTR/L 0980

## 2024-08-06 ENCOUNTER — HOSPITAL ENCOUNTER (OUTPATIENT)
Dept: OCCUPATIONAL THERAPY | Age: 69
Setting detail: THERAPIES SERIES
Discharge: HOME | End: 2024-08-06
Payer: MEDICARE

## 2024-08-06 PROCEDURE — 97140 MANUAL THERAPY 1/> REGIONS: CPT

## 2024-08-06 PROCEDURE — 97018 PARAFFIN BATH THERAPY: CPT

## 2024-08-06 PROCEDURE — 97110 THERAPEUTIC EXERCISES: CPT

## 2024-08-06 NOTE — PROGRESS NOTES
Motion, Manual Therapy - Soft Tissue Mobilization, Manual Therapy - Joint Manipulation, Home Exercise Program, Patient Education, and Modalities    []  Plan of care initiated.   [x]  Continue with current plan of care. Plan to see patient 2 times per week for 12 weeks to address the treatment planned outlined above.  []  Modify plan of care as follows:    []  Hold pending physician visit  []  Discharge    Time In 0811   Time Out 0854   Timed Code Minutes: 28 min   Total Treatment Time: 43 min       Alesha ABARCA #[id number]

## 2024-08-08 ENCOUNTER — APPOINTMENT (OUTPATIENT)
Dept: OCCUPATIONAL THERAPY | Age: 69
End: 2024-08-08
Payer: MEDICARE

## 2024-08-09 ENCOUNTER — HOSPITAL ENCOUNTER (OUTPATIENT)
Dept: OCCUPATIONAL THERAPY | Age: 69
Setting detail: THERAPIES SERIES
Discharge: HOME | End: 2024-08-09
Payer: MEDICARE

## 2024-08-09 PROCEDURE — 97018 PARAFFIN BATH THERAPY: CPT

## 2024-08-09 PROCEDURE — 97110 THERAPEUTIC EXERCISES: CPT

## 2024-08-09 PROCEDURE — 97140 MANUAL THERAPY 1/> REGIONS: CPT

## 2024-08-09 NOTE — PROGRESS NOTES
[facility]  OCCUPATIONAL THERAPY  [] EVALUATION  [x] DAILY NOTE (LAND) [] DAILY NOTE (AQUATIC ) [] PROGRESS NOTE [] DISCHARGE NOTE    [x] OUTPATIENT REHABILITATION CENTER [facility]   [] [place] [facility]    [] [facility]   [] [facility]    Date: 2024  Patient Name:  Alesha Ndiaye  : 1955  MRN: [id number]  CSN: [id number]    Referring Practitioner Ayad Marroquin MD      Diagnosis Displaced fracture of right ulna styloid process, subsequent encounter for closed fracture with routine healing S52.611D  Other fractures of lower end of right radius, subsequent encounter for closed fracture with routine healing S52.591D   Treatment Diagnosis M79.601  Right Arm Pain  M25.531  Right Wrist Pain  M25.631 Stiffness of Right Wrist  R53.1 Weakness   Date of Evaluation 24    Additional Pertinent History Alesha Ndiaye has a past medical history of Acid reflux, Arthritis, Cancer (HCC), Cancer (HCC), Cataracts, bilateral, Constipation, Depression, Epilepsy (HCC), Fibromyalgia, Hearing loss, Hiatal hernia, History of Luis-Barr virus infection, Hoarseness, Hypertension, Hypothyroidism, Mono exposure, Night sweats, Osteoarthritis, Sinus infection, SOB (shortness of breath), and UTI (urinary tract infection).      Functional Outcome Measure Used UEFS   Functional Outcome Score  (24) 32/80       Insurance: Primary: Payor: MEDICARE /  /  / ,   Secondary: AETNA   Authorization Information: Patient has unlimited visits based on medical necessity  Benefit will not cover maintenance or preventative treatment.  AQUATIC THERAPY COVERED:   Yes  MODALITIES COVERED:  Yes, with the exception of iontophoresis and hot packs/cold packs  TELEHEALTH COVERED: Yes   Approved Procedure Codes: Authorization of Specific CPT Codes Not Required  (Codes requested indicated by red font, codes approved indicated by black font)   Visit # 5, 5/10 for progress note (Reporting Period:

## 2024-08-12 ENCOUNTER — HOSPITAL ENCOUNTER (OUTPATIENT)
Dept: OCCUPATIONAL THERAPY | Age: 69
Setting detail: THERAPIES SERIES
Discharge: HOME | End: 2024-08-12
Payer: MEDICARE

## 2024-08-12 PROCEDURE — 97110 THERAPEUTIC EXERCISES: CPT

## 2024-08-12 PROCEDURE — 97018 PARAFFIN BATH THERAPY: CPT

## 2024-08-12 NOTE — PROGRESS NOTES
[facility]  OCCUPATIONAL THERAPY  [] EVALUATION  [x] DAILY NOTE (LAND) [] DAILY NOTE (AQUATIC ) [] PROGRESS NOTE [] DISCHARGE NOTE    [x] OUTPATIENT REHABILITATION CENTER [facility]   [] [place] [facility]    [] [facility]   [] [facility]    Date: 2024  Patient Name:  Alesha Ndiaye  : 1955  MRN: [id number]  CSN: [id number]    Referring Practitioner Ayad Marroquin MD      Diagnosis Displaced fracture of right ulna styloid process, subsequent encounter for closed fracture with routine healing S52.611D  Other fractures of lower end of right radius, subsequent encounter for closed fracture with routine healing S52.591D   Treatment Diagnosis M79.601  Right Arm Pain  M25.531  Right Wrist Pain  M25.631 Stiffness of Right Wrist  R53.1 Weakness   Date of Evaluation 24    Additional Pertinent History Alesha Ndiaye has a past medical history of Acid reflux, Arthritis, Cancer (HCC), Cancer (HCC), Cataracts, bilateral, Constipation, Depression, Epilepsy (HCC), Fibromyalgia, Hearing loss, Hiatal hernia, History of Luis-Barr virus infection, Hoarseness, Hypertension, Hypothyroidism, Mono exposure, Night sweats, Osteoarthritis, Sinus infection, SOB (shortness of breath), and UTI (urinary tract infection).      Functional Outcome Measure Used UEFS   Functional Outcome Score  (24) 32/80       Insurance: Primary: Payor: MEDICARE /  /  / ,   Secondary: AETNA   Authorization Information: Patient has unlimited visits based on medical necessity  Benefit will not cover maintenance or preventative treatment.  AQUATIC THERAPY COVERED:   Yes  MODALITIES COVERED:  Yes, with the exception of iontophoresis and hot packs/cold packs  TELEHEALTH COVERED: Yes   Approved Procedure Codes: Authorization of Specific CPT Codes Not Required  (Codes requested indicated by red font, codes approved indicated by black font)   Visit # 6, 6/10 for progress note (Reporting Period:

## 2024-08-15 ENCOUNTER — APPOINTMENT (OUTPATIENT)
Dept: OCCUPATIONAL THERAPY | Age: 69
End: 2024-08-15
Payer: MEDICARE

## 2024-08-16 ENCOUNTER — HOSPITAL ENCOUNTER (OUTPATIENT)
Dept: OCCUPATIONAL THERAPY | Age: 69
Setting detail: THERAPIES SERIES
Discharge: HOME | End: 2024-08-16
Payer: MEDICARE

## 2024-08-16 PROCEDURE — 97140 MANUAL THERAPY 1/> REGIONS: CPT

## 2024-08-16 PROCEDURE — 97018 PARAFFIN BATH THERAPY: CPT

## 2024-08-16 PROCEDURE — 97110 THERAPEUTIC EXERCISES: CPT

## 2024-08-16 NOTE — PROGRESS NOTES
[facility]  OCCUPATIONAL THERAPY  [] EVALUATION  [x] DAILY NOTE (LAND) [] DAILY NOTE (AQUATIC ) [] PROGRESS NOTE [] DISCHARGE NOTE    [x] OUTPATIENT REHABILITATION CENTER [facility]   [] [place] [facility]    [] [facility]   [] [facility]    Date: 2024  Patient Name:  Alesha Ndiaye  : 1955  MRN: [id number]  CSN: [id number]    Referring Practitioner Ayad Marroquin MD      Diagnosis Displaced fracture of right ulna styloid process, subsequent encounter for closed fracture with routine healing S52.611D  Other fractures of lower end of right radius, subsequent encounter for closed fracture with routine healing S52.591D   Treatment Diagnosis M79.601  Right Arm Pain  M25.531  Right Wrist Pain  M25.631 Stiffness of Right Wrist  R53.1 Weakness   Date of Evaluation 24    Additional Pertinent History Alesha Ndiaye has a past medical history of Acid reflux, Arthritis, Cancer (HCC), Cancer (HCC), Cataracts, bilateral, Constipation, Depression, Epilepsy (HCC), Fibromyalgia, Hearing loss, Hiatal hernia, History of Luis-Barr virus infection, Hoarseness, Hypertension, Hypothyroidism, Mono exposure, Night sweats, Osteoarthritis, Sinus infection, SOB (shortness of breath), and UTI (urinary tract infection).      Functional Outcome Measure Used UEFS   Functional Outcome Score  (24) 32/80       Insurance: Primary: Payor: MEDICARE /  /  / ,   Secondary: AETNA   Authorization Information: Patient has unlimited visits based on medical necessity  Benefit will not cover maintenance or preventative treatment.  AQUATIC THERAPY COVERED:   Yes  MODALITIES COVERED:  Yes, with the exception of iontophoresis and hot packs/cold packs  TELEHEALTH COVERED: Yes   Approved Procedure Codes: Authorization of Specific CPT Codes Not Required  (Codes requested indicated by red font, codes approved indicated by black font)   Visit # 7, 7/10 for progress note (Reporting Period:

## 2024-08-19 ENCOUNTER — HOSPITAL ENCOUNTER (OUTPATIENT)
Dept: OCCUPATIONAL THERAPY | Age: 69
Setting detail: THERAPIES SERIES
Discharge: HOME | End: 2024-08-19
Payer: MEDICARE

## 2024-08-19 PROCEDURE — 97110 THERAPEUTIC EXERCISES: CPT

## 2024-08-19 PROCEDURE — 97018 PARAFFIN BATH THERAPY: CPT

## 2024-08-19 NOTE — PROGRESS NOTES
[facility]  OCCUPATIONAL THERAPY  [] EVALUATION  [x] DAILY NOTE (LAND) [] DAILY NOTE (AQUATIC ) [] PROGRESS NOTE [] DISCHARGE NOTE    [x] OUTPATIENT REHABILITATION CENTER [facility]   [] [place] [facility]    [] [facility]   [] [facility]    Date: 2024  Patient Name:  Alesha Ndiaye  : 1955  MRN: [id number]  CSN: [id number]    Referring Practitioner Ayad Marroquin MD      Diagnosis Displaced fracture of right ulna styloid process, subsequent encounter for closed fracture with routine healing S52.611D  Other fractures of lower end of right radius, subsequent encounter for closed fracture with routine healing S52.591D   Treatment Diagnosis M79.601  Right Arm Pain  M25.531  Right Wrist Pain  M25.631 Stiffness of Right Wrist  R53.1 Weakness   Date of Evaluation 24    Additional Pertinent History Alesha Ndiaye has a past medical history of Acid reflux, Arthritis, Cancer (HCC), Cancer (HCC), Cataracts, bilateral, Constipation, Depression, Epilepsy (HCC), Fibromyalgia, Hearing loss, Hiatal hernia, History of Luis-Barr virus infection, Hoarseness, Hypertension, Hypothyroidism, Mono exposure, Night sweats, Osteoarthritis, Sinus infection, SOB (shortness of breath), and UTI (urinary tract infection).      Functional Outcome Measure Used UEFS   Functional Outcome Score  (24) 32/80       Insurance: Primary: Payor: MEDICARE /  /  / ,   Secondary: AETNA   Authorization Information: Patient has unlimited visits based on medical necessity  Benefit will not cover maintenance or preventative treatment.  AQUATIC THERAPY COVERED:   Yes  MODALITIES COVERED:  Yes, with the exception of iontophoresis and hot packs/cold packs  TELEHEALTH COVERED: Yes   Approved Procedure Codes: Authorization of Specific CPT Codes Not Required  (Codes requested indicated by red font, codes approved indicated by black font)   Visit # 8, 8/10 for progress note (Reporting Period:

## 2024-08-22 ENCOUNTER — HOSPITAL ENCOUNTER (OUTPATIENT)
Dept: OCCUPATIONAL THERAPY | Age: 69
Setting detail: THERAPIES SERIES
Discharge: HOME | End: 2024-08-22
Payer: MEDICARE

## 2024-08-22 PROCEDURE — 97018 PARAFFIN BATH THERAPY: CPT

## 2024-08-22 PROCEDURE — 97110 THERAPEUTIC EXERCISES: CPT

## 2024-08-22 NOTE — PROGRESS NOTES
Right  = 24#, Left  = 43#, right tip pinch = 6#, lateral pinch = 10#.  REVISED GOAL:  Patient will increase right  strength to 29#, right tip pinch to 8# for increased ease with opening jars and water bottles.      Long Term Goals:  Time Frame: 12 weeks  1.  Patient will improve UEFS to at least 50.  GOAL MET.  UEFS = 54/80.  No new goal.    2.  Patient will increase right hand fine motor coordination as demonstrated by completing 9 hole peg test in 26 seconds for ease with writing.  GOAL NOT MET.  9 hole peg test completed in 27 seconds.  CONTINUE GOAL.   3.  Patient will report no more than minimal difficulty with writing tasks.  GOAL NOT MET.  Patient reports she still has greater than moderate difficulty with writing tasks.  CONTINUE GOAL.    Patient Education:   [x]  HEP/Education Completed: Plan of Care, Goals,   [employer] Access Code for HEP: Access Code: [email or web address]  URL: [email or web address]  Date: 07/25/2024  Prepared by: Miguel Castro    Exercises  - Seated Forearm Pronation and Supination AROM  - 3 x daily - 7 x weekly - 1 sets - 10 reps  - Wrist AROM Flexion Extension  - 3 x daily - 7 x weekly - 1 sets - 10 reps  - Wrist AROM Radial Ulnar Deviation  - 3 x daily - 7 x weekly - 1 sets - 10 reps  - Wrist Flexion Extension AROM - Palms Down  - 3 x daily - 7 x weekly - 1 sets - 10 reps  - Seated Wrist Flexion Stretch  - 3 x daily - 7 x weekly - 1 sets - 10 reps - 5 hold  - Wrist Extension Stretch Pronated  - 3 x daily - 7 x weekly - 1 sets - 10 reps - 5 hold  - Wrist Prayer Stretch  - 2 x daily - 7 x weekly - 1 sets - 5 reps - 10 hold  7/30/24: education on trigger finger, with splint for finger to prevent popping; modalities as needed at home for pain  8/2/24 issued dense foam block for pt. To squeeze and pinch at home  8/6: towel scrunch and release for digit flexion/extension, picking up coins for R hand FMC   8/9: use of heat prior to HEP at home, discussed that patient can purchase  wheelchair

## 2024-08-27 ENCOUNTER — HOSPITAL ENCOUNTER (OUTPATIENT)
Dept: OCCUPATIONAL THERAPY | Age: 69
Setting detail: THERAPIES SERIES
Discharge: HOME | End: 2024-08-27
Payer: MEDICARE

## 2024-08-27 PROCEDURE — 97018 PARAFFIN BATH THERAPY: CPT

## 2024-08-27 PROCEDURE — 97110 THERAPEUTIC EXERCISES: CPT

## 2024-08-27 PROCEDURE — 97140 MANUAL THERAPY 1/> REGIONS: CPT

## 2024-08-27 NOTE — PROGRESS NOTES
[facility]  OCCUPATIONAL THERAPY  [] EVALUATION  [x] DAILY NOTE (LAND) [] DAILY NOTE (AQUATIC ) [] PROGRESS NOTE [] DISCHARGE NOTE    [x] OUTPATIENT REHABILITATION CENTER [facility]   [] [place] [facility]    [] [facility]   [] [facility]    Date: 2024  Patient Name:  Alesha Ndiaye  : 1955  MRN: [id number]  CSN: [id number]    Referring Practitioner Ayad Marroquin MD      Diagnosis Displaced fracture of right ulna styloid process, subsequent encounter for closed fracture with routine healing S52.611D  Other fractures of lower end of right radius, subsequent encounter for closed fracture with routine healing S52.591D   Treatment Diagnosis M79.601  Right Arm Pain  M25.531  Right Wrist Pain  M25.631 Stiffness of Right Wrist  R53.1 Weakness   Date of Evaluation 24    Additional Pertinent History Alesha Ndiaye has a past medical history of Acid reflux, Arthritis, Cancer (HCC), Cancer (HCC), Cataracts, bilateral, Constipation, Depression, Epilepsy (HCC), Fibromyalgia, Hearing loss, Hiatal hernia, History of Luis-Barr virus infection, Hoarseness, Hypertension, Hypothyroidism, Mono exposure, Night sweats, Osteoarthritis, Sinus infection, SOB (shortness of breath), and UTI (urinary tract infection).      Functional Outcome Measure Used UEFS   Functional Outcome Score  (24) 32/80 (24) 54/80       Insurance: Primary: Payor: MEDICARE /  /  / ,   Secondary: AETNA   Authorization Information: Patient has unlimited visits based on medical necessity  Benefit will not cover maintenance or preventative treatment.  AQUATIC THERAPY COVERED:   Yes  MODALITIES COVERED:  Yes, with the exception of iontophoresis and hot packs/cold packs  TELEHEALTH COVERED: Yes   Approved Procedure Codes: Authorization of Specific CPT Codes Not Required  (Codes requested indicated by red font, codes approved indicated by black font)   Visit # 10, 1/10 for progress note

## 2024-08-28 ENCOUNTER — HOSPITAL ENCOUNTER (OUTPATIENT)
Dept: OCCUPATIONAL THERAPY | Age: 69
Setting detail: THERAPIES SERIES
End: 2024-08-28
Payer: MEDICARE

## 2024-09-03 ENCOUNTER — HOSPITAL ENCOUNTER (OUTPATIENT)
Dept: OCCUPATIONAL THERAPY | Age: 69
Setting detail: THERAPIES SERIES
Discharge: HOME | End: 2024-09-03
Payer: MEDICARE

## 2024-09-03 PROCEDURE — 97110 THERAPEUTIC EXERCISES: CPT

## 2024-09-03 PROCEDURE — 97018 PARAFFIN BATH THERAPY: CPT

## 2024-09-03 PROCEDURE — 97140 MANUAL THERAPY 1/> REGIONS: CPT

## 2024-09-03 NOTE — PROGRESS NOTES
fatigue noted. Will continue to progress with increased strengthening tolerance, to increase endurance and work demand tasks and ADL tasks. Writing remains difficult.     Areas for Improvement: edema, impaired coordination, impaired ROM, impaired strength, and pain  Prognosis: good    GOALS:  Patient Goal: improve writing and open a water bottle easier    Short Term Goals:  Time Frame: 5 weeks   Patient will be independent with UE HEP for increased ease with opening bottles and jars.  GOAL MET.  Patient reports doing her HEP throughout the day.  Patient reports she still has difficulty with opening bottles and jars.  REVISED GOAL:  Patient will be independent with UE HEP updates for increased ease with opening jars and bottles.   Patient will increase AROM right wrist flexion to 45, extension to 55, supination to 60 degrees for increased ease with pushing up from chairs. GOAL MET.  AROM right wrist flexion = 55, extension = 60, supination = 60.  REVISED GOAL:  Patient will increase AROM right wrist flexion to 65, extension to 70 for increased ease with pushing up from chairs.     Patient will increase PROM right wrist flexion to 60, extension to 65 and supination to 65 degrees for increased ease with AROM and turning a doorknob.  GOAL MET.  PROM right wrist flexion = 65, extension = 70, supination = 65 degrees.  REVISED GOAL:  Patient will increase PROM right wrist flexion to 75 degrees for increased ease with AROM and turning a doorknob.     Patient will increase right  strength to 20#, right tip pinch to 6# and lateral pinch to 8# for increased ease with opening water bottles.  Right  = 24#, Left  = 43#, right tip pinch = 6#, lateral pinch = 10#.  REVISED GOAL:  Patient will increase right  strength to 29#, right tip pinch to 8# for increased ease with opening jars and water bottles.      Long Term Goals:  Time Frame: 12 weeks  1.  Patient will improve UEFS to at least 50.  GOAL MET.  UEFS = 54/80.

## 2024-09-05 ENCOUNTER — HOSPITAL ENCOUNTER (OUTPATIENT)
Dept: OCCUPATIONAL THERAPY | Age: 69
Setting detail: THERAPIES SERIES
Discharge: HOME | End: 2024-09-05
Payer: MEDICARE

## 2024-09-05 PROCEDURE — 97018 PARAFFIN BATH THERAPY: CPT

## 2024-09-05 PROCEDURE — 97110 THERAPEUTIC EXERCISES: CPT

## 2024-09-05 NOTE — PROGRESS NOTES
[facility]  OCCUPATIONAL THERAPY  [] EVALUATION  [x] DAILY NOTE (LAND) [] DAILY NOTE (AQUATIC ) [] PROGRESS NOTE [] DISCHARGE NOTE    [x] OUTPATIENT REHABILITATION CENTER [facility]   [] [place] [facility] [place]    [] [facility]   [] SIMONE [facility]    Date: 2024  Patient Name:  Alesha Ndiaye  : 1955  MRN: [id number]  CSN: [id number]    Referring Practitioner Ayad Marroquin MD      Diagnosis Displaced fracture of right ulna styloid process, subsequent encounter for closed fracture with routine healing S52.611D  Other fractures of lower end of right radius, subsequent encounter for closed fracture with routine healing S52.591D   Treatment Diagnosis M79.601  Right Arm Pain  M25.531  Right Wrist Pain  M25.631 Stiffness of Right Wrist  R53.1 Weakness   Date of Evaluation 24    Additional Pertinent History Alesha Ndiaye has a past medical history of Acid reflux, Arthritis, Cancer (HCC), Cancer (HCC), Cataracts, bilateral, Constipation, Depression, Epilepsy (HCC), Fibromyalgia, Hearing loss, Hiatal hernia, History of Luis-Barr virus infection, Hoarseness, Hypertension, Hypothyroidism, Mono exposure, Night sweats, Osteoarthritis, Sinus infection, SOB (shortness of breath), and UTI (urinary tract infection).      Functional Outcome Measure Used UEFS   Functional Outcome Score  (24) 32/80 (24) 54/80       Insurance: Primary: Payor: MEDICARE /  /  / ,   Secondary: AETNA   Authorization Information: Patient has unlimited visits based on medical necessity  Benefit will not cover maintenance or preventative treatment.  AQUATIC THERAPY COVERED:   Yes  MODALITIES COVERED:  Yes, with the exception of iontophoresis and hot packs/cold packs  TELEHEALTH COVERED: Yes   Approved Procedure Codes: Authorization of Specific CPT Codes Not Required  (Codes requested indicated by red font, codes approved indicated by black font)   Visit # 12, 3/10 for progress note (Reporting

## 2024-09-09 ENCOUNTER — HOSPITAL ENCOUNTER (OUTPATIENT)
Dept: OCCUPATIONAL THERAPY | Age: 69
Setting detail: THERAPIES SERIES
Discharge: HOME | End: 2024-09-09
Payer: MEDICARE

## 2024-09-09 PROCEDURE — 97018 PARAFFIN BATH THERAPY: CPT

## 2024-09-09 PROCEDURE — 97110 THERAPEUTIC EXERCISES: CPT

## 2024-09-12 ENCOUNTER — HOSPITAL ENCOUNTER (OUTPATIENT)
Dept: OCCUPATIONAL THERAPY | Age: 69
Setting detail: THERAPIES SERIES
Discharge: HOME | End: 2024-09-12
Payer: MEDICARE

## 2024-09-12 PROCEDURE — 97110 THERAPEUTIC EXERCISES: CPT

## 2024-09-17 ENCOUNTER — HOSPITAL ENCOUNTER (OUTPATIENT)
Dept: OCCUPATIONAL THERAPY | Age: 69
Setting detail: THERAPIES SERIES
Discharge: HOME | End: 2024-09-17
Payer: MEDICARE

## 2024-09-17 PROCEDURE — 97110 THERAPEUTIC EXERCISES: CPT

## 2024-09-17 PROCEDURE — 97140 MANUAL THERAPY 1/> REGIONS: CPT

## 2024-09-19 ENCOUNTER — APPOINTMENT (OUTPATIENT)
Dept: OCCUPATIONAL THERAPY | Age: 69
End: 2024-09-19
Payer: MEDICARE

## 2024-09-23 ENCOUNTER — HOSPITAL ENCOUNTER (OUTPATIENT)
Dept: OCCUPATIONAL THERAPY | Age: 69
Setting detail: THERAPIES SERIES
Discharge: HOME | End: 2024-09-23
Payer: MEDICARE

## 2024-09-23 ENCOUNTER — HOSPITAL ENCOUNTER (EMERGENCY)
Age: 69
Discharge: HOME | End: 2024-09-23
Payer: MEDICARE

## 2024-09-23 VITALS
RESPIRATION RATE: 18 BRPM | DIASTOLIC BLOOD PRESSURE: 82 MMHG | OXYGEN SATURATION: 97 % | TEMPERATURE: 97.9 F | SYSTOLIC BLOOD PRESSURE: 151 MMHG | HEART RATE: 79 BPM

## 2024-09-23 DIAGNOSIS — J01.40 ACUTE NON-RECURRENT PANSINUSITIS: Primary | ICD-10-CM

## 2024-09-23 LAB
BILIRUB UR STRIP.AUTO-MCNC: NEGATIVE MG/DL
CHARACTER UR: CLEAR
COLOR, UA: YELLOW
GLUCOSE UR QL STRIP.AUTO: NEGATIVE MG/DL
KETONES UR QL STRIP.AUTO: NEGATIVE
NITRITE UR QL STRIP.AUTO: NEGATIVE
PH UR STRIP.AUTO: 7.5 [PH] (ref 5–9)
PROT UR STRIP.AUTO-MCNC: NEGATIVE MG/DL
RBC #/AREA URNS HPF: NORMAL /[HPF]
SP GR UR STRIP.AUTO: 1.01 (ref 1–1.03)
UROBILINOGEN, URINE: 0.2 EU/DL (ref 0.2–1)
WBC #/AREA URNS HPF: NEGATIVE /[HPF]

## 2024-09-23 PROCEDURE — 97018 PARAFFIN BATH THERAPY: CPT

## 2024-09-23 PROCEDURE — 99212 OFFICE O/P EST SF 10 MIN: CPT | Performed by: NURSE PRACTITIONER

## 2024-09-23 PROCEDURE — 99213 OFFICE O/P EST LOW 20 MIN: CPT

## 2024-09-23 PROCEDURE — 97110 THERAPEUTIC EXERCISES: CPT

## 2024-09-23 PROCEDURE — 81003 URINALYSIS AUTO W/O SCOPE: CPT

## 2024-09-23 RX ORDER — PROGESTERONE 100 MG/1
100 CAPSULE ORAL NIGHTLY
COMMUNITY
Start: 2024-07-29

## 2024-09-23 ASSESSMENT — PAIN - FUNCTIONAL ASSESSMENT: PAIN_FUNCTIONAL_ASSESSMENT: NONE - DENIES PAIN

## 2024-09-23 ASSESSMENT — ENCOUNTER SYMPTOMS
SINUS PAIN: 1
SINUS PRESSURE: 1

## 2024-09-23 NOTE — ED NOTES
Pt with complaints of urinary frequency that started 2 weeks ago. States it has not got better so she feels as if she has a UTI.      Rosalia Morales LPN  09/23/24 0954

## 2024-09-23 NOTE — ED PROVIDER NOTES
her brother; Other in her mother; Stroke in her mother; Stroke (age of onset: 52) in her maternal grandfather.    SOCIAL HISTORY     Patient  reports that she quit smoking about 9 years ago. Her smoking use included cigarettes. She started smoking about 38 years ago. She has a 14.5 pack-year smoking history. She has never used smokeless tobacco. She reports current alcohol use of about 2.0 standard drinks of alcohol per week. She reports current drug use. Drug: Marijuana (Weed).    PHYSICAL EXAM     ED TRIAGE VITALS  BP: (!) 151/82, Temp: 97.9 °F (36.6 °C), Pulse: 79, Respirations: 18, SpO2: 97 %  Physical Exam  Vitals and nursing note reviewed.   Constitutional:       General: She is not in acute distress.     Appearance: Normal appearance. She is not ill-appearing or toxic-appearing.   HENT:      Head: Normocephalic and atraumatic.      Nose: No congestion or rhinorrhea.      Right Sinus: Maxillary sinus tenderness and frontal sinus tenderness present.      Left Sinus: Maxillary sinus tenderness and frontal sinus tenderness present.      Mouth/Throat:      Mouth: Mucous membranes are moist.      Pharynx: Oropharynx is clear.   Eyes:      Extraocular Movements: Extraocular movements intact.      Conjunctiva/sclera: Conjunctivae normal.      Pupils: Pupils are equal, round, and reactive to light.   Cardiovascular:      Rate and Rhythm: Normal rate and regular rhythm.      Pulses: Normal pulses.      Heart sounds: Normal heart sounds. No murmur heard.  Pulmonary:      Effort: Pulmonary effort is normal. No respiratory distress.      Breath sounds: Normal breath sounds. No wheezing.   Abdominal:      General: Abdomen is flat.      Palpations: Abdomen is soft.      Tenderness: There is no abdominal tenderness.   Musculoskeletal:         General: No swelling or deformity. Normal range of motion.      Cervical back: Normal range of motion and neck supple.   Skin:     General: Skin is warm and dry.      Capillary Refill:  CNP  09/23/24 0939       Yesika Infante, LIS - CNP  09/23/24 0943

## 2024-09-26 ENCOUNTER — HOSPITAL ENCOUNTER (OUTPATIENT)
Dept: OCCUPATIONAL THERAPY | Age: 69
Setting detail: THERAPIES SERIES
Discharge: HOME | End: 2024-09-26
Payer: MEDICARE

## 2024-09-26 PROCEDURE — 97110 THERAPEUTIC EXERCISES: CPT

## 2024-09-26 PROCEDURE — 97018 PARAFFIN BATH THERAPY: CPT

## 2024-09-30 ENCOUNTER — HOSPITAL ENCOUNTER (OUTPATIENT)
Dept: OCCUPATIONAL THERAPY | Age: 69
Setting detail: THERAPIES SERIES
Discharge: HOME | End: 2024-09-30
Payer: MEDICARE

## 2024-09-30 PROCEDURE — 97018 PARAFFIN BATH THERAPY: CPT

## 2024-09-30 PROCEDURE — 97110 THERAPEUTIC EXERCISES: CPT

## 2024-09-30 NOTE — PROGRESS NOTES
board and take out with in hand maniuplation   Able to hold 18 before dropping.       Specific Interventions Next Treatment: AROM, AAROM, PROM, eventual strengthening, edema control, paraffin vs. Fluidotherapy, joint mobilizations    Activity/Treatment Tolerance:  [x]  Patient tolerated treatment well  []  Patient limited by fatigue  []  Patient limited by pain   []  Patient limited by other medical complications  []  Other:     Assessment: Patient is progressing towards her goals.  Will continue to progress pinch,  strengthening.    Areas for Improvement: edema, impaired coordination, impaired ROM, impaired strength, and pain  Prognosis: good    GOALS:  Patient Goal: improve writing and open a water bottle easier    Short Term Goals:  Time Frame: 5 weeks   Patient will be independent with UE HEP for increased ease with opening bottles and jars.  GOAL MET.  Patient reports doing her HEP throughout the day.  Patient reports she still has difficulty with opening bottles and jars.  REVISED GOAL:  Patient will be independent with UE HEP updates for increased ease with opening jars and bottles.   Patient will increase AROM right wrist flexion to 45, extension to 55, supination to 60 degrees for increased ease with pushing up from chairs. GOAL MET.  AROM right wrist flexion = 55, extension = 60, supination = 60.  REVISED GOAL:  Patient will increase AROM right wrist flexion to 65, extension to 70 for increased ease with pushing up from chairs.     Patient will increase PROM right wrist flexion to 60, extension to 65 and supination to 65 degrees for increased ease with AROM and turning a doorknob.  GOAL MET.  PROM right wrist flexion = 65, extension = 70, supination = 65 degrees.  REVISED GOAL:  Patient will increase PROM right wrist flexion to 75 degrees for increased ease with AROM and turning a doorknob.     Patient will increase right  strength to 20#, right tip pinch to 6# and lateral pinch to 8# for increased

## 2024-10-03 ENCOUNTER — HOSPITAL ENCOUNTER (OUTPATIENT)
Dept: OCCUPATIONAL THERAPY | Age: 69
Setting detail: THERAPIES SERIES
Discharge: HOME | End: 2024-10-03
Payer: MEDICARE

## 2024-10-03 PROCEDURE — 97018 PARAFFIN BATH THERAPY: CPT

## 2024-10-03 PROCEDURE — 97110 THERAPEUTIC EXERCISES: CPT

## 2024-10-03 NOTE — PROGRESS NOTES
ease with opening jars and bottles.  GOAL MET.  Patient reports she does her HEP 1 time a day.  She states it is a little easier to open bottles.  She states it is still difficult to open jars.  CONTINUE GOAL with advancements in HEP.   Patient will increase AROM right wrist flexion to 65, extension to 70 for increased ease with pushing up from chairs.  GOAL MET.  AROM right wrist flexion = 67, extension = 70 degrees.  REVISED GOAL:  Patient will increase AROM right wrist flexion to 75 degrees for increased ease with opening jars and doors.    Patient will increase PROM right wrist flexion to 75 degrees for increased ease with AROM and turning a doorknob.  GOAL MET.  PROM right wrist flexion = 80 degrees.  No new goal.    Patient will increase right  strength to 29#, right tip pinch to 8# for increased ease with opening jars and water bottles.  GOAL NOT MET.  Right  = 27#, Left  = 50#, right tip pinch = 7#.  CONTINUE GOAL.     Long Term Goals:  Time Frame: 12 weeks  1.  Patient will improve UEFS to at least 50.  GOAL MET.  UEFS = 60/80.  No new goal.    2.  Patient will increase right hand fine motor coordination as demonstrated by completing 9 hole peg test in 26 seconds for ease with writing.  GOAL MET.  9 hole peg test completed in 24 seconds.  No new goal.   3.  Patient will report no more than minimal difficulty with writing tasks.  GOAL NOT MET.  Patient reports writing is improving but she has minimal to moderate difficulty with writing tasks.  CONTINUE GOAL.    Patient Education:   [x]  HEP/Education Completed: Plan of Care, Goals,   [employer] Access Code for HEP: Access Code: [email or web address]  URL: [email or web address]  Date: 07/25/2024  Prepared by: Miguel Castro    Exercises  - Seated Forearm Pronation and Supination AROM  - 3 x daily - 7 x weekly - 1 sets - 10 reps  - Wrist AROM Flexion Extension  - 3 x daily - 7 x weekly - 1 sets - 10 reps  - Wrist AROM Radial Ulnar Deviation  - 3 x daily

## 2024-10-08 ENCOUNTER — HOSPITAL ENCOUNTER (OUTPATIENT)
Dept: OCCUPATIONAL THERAPY | Age: 69
Setting detail: THERAPIES SERIES
End: 2024-10-08
Payer: MEDICARE

## 2024-10-10 ENCOUNTER — APPOINTMENT (OUTPATIENT)
Dept: OCCUPATIONAL THERAPY | Age: 69
End: 2024-10-10
Payer: MEDICARE

## 2024-10-15 ENCOUNTER — HOSPITAL ENCOUNTER (OUTPATIENT)
Dept: OCCUPATIONAL THERAPY | Age: 69
Setting detail: THERAPIES SERIES
Discharge: HOME | End: 2024-10-15
Payer: MEDICARE

## 2024-10-15 PROCEDURE — 97110 THERAPEUTIC EXERCISES: CPT

## 2024-10-15 NOTE — PROGRESS NOTES
REVISED GOAL:  Patient will increase AROM right wrist flexion to 75 degrees for increased ease with opening jars and doors.    Patient will increase PROM right wrist flexion to 75 degrees for increased ease with AROM and turning a doorknob.  GOAL MET.  PROM right wrist flexion = 80 degrees.  No new goal.    Patient will increase right  strength to 29#, right tip pinch to 8# for increased ease with opening jars and water bottles.  GOAL NOT MET.  Right  = 27#, Left  = 50#, right tip pinch = 7#.  CONTINUE GOAL.     Long Term Goals:  Time Frame: 12 weeks  1.  Patient will improve UEFS to at least 50.  GOAL MET.  UEFS = 60/80.  No new goal.    2.  Patient will increase right hand fine motor coordination as demonstrated by completing 9 hole peg test in 26 seconds for ease with writing.  GOAL MET.  9 hole peg test completed in 24 seconds.  No new goal.   3.  Patient will report no more than minimal difficulty with writing tasks.  GOAL NOT MET.  Patient reports writing is improving but she has minimal to moderate difficulty with writing tasks.  CONTINUE GOAL.    Patient Education:   [x]  HEP/Education Completed: Plan of Care, Goals,   [employer] Access Code for HEP: Access Code: [email or web address]  URL: [email or web address]  Date: 07/25/2024  Prepared by: Miguel Castro    Exercises  - Seated Forearm Pronation and Supination AROM  - 3 x daily - 7 x weekly - 1 sets - 10 reps  - Wrist AROM Flexion Extension  - 3 x daily - 7 x weekly - 1 sets - 10 reps  - Wrist AROM Radial Ulnar Deviation  - 3 x daily - 7 x weekly - 1 sets - 10 reps  - Wrist Flexion Extension AROM - Palms Down  - 3 x daily - 7 x weekly - 1 sets - 10 reps  - Seated Wrist Flexion Stretch  - 3 x daily - 7 x weekly - 1 sets - 10 reps - 5 hold  - Wrist Extension Stretch Pronated  - 3 x daily - 7 x weekly - 1 sets - 10 reps - 5 hold  - Wrist Prayer Stretch  - 2 x daily - 7 x weekly - 1 sets - 5 reps - 10 hold  7/30/24: education on trigger finger, with

## 2024-10-17 ENCOUNTER — HOSPITAL ENCOUNTER (OUTPATIENT)
Dept: OCCUPATIONAL THERAPY | Age: 69
Setting detail: THERAPIES SERIES
Discharge: HOME | End: 2024-10-17
Payer: MEDICARE

## 2024-10-17 PROCEDURE — 97022 WHIRLPOOL THERAPY: CPT

## 2024-10-17 PROCEDURE — 97110 THERAPEUTIC EXERCISES: CPT

## 2024-10-17 NOTE — PROGRESS NOTES
care  9/12/24:  issued orange putty for HEP - , pinch, pulls  10/3/24: plan of care  []  No new Education completed  [x]  Reviewed Prior HEP      []  Patient verbalized and/or demonstrated understanding of education provided.  []  Patient unable to verbalize and/or demonstrate understanding of education provided.  Will continue education.  [x]  Barriers to learning: hard of hearing    PLAN:  Treatment Recommendations: Strengthening, Range of Motion, Manual Therapy - Soft Tissue Mobilization, Manual Therapy - Joint Manipulation, Home Exercise Program, Patient Education, and Modalities    []  Plan of care initiated.   [x]  Continue with current plan of care. Plan to see patient 2 times per week for 12 weeks to address the treatment planned outlined above.  [x]  Modify plan of care as follows: Continue 2x week x 4 more weeks   []  Hold pending physician visit  []  Discharge    Time In 1448   Time Out 1532   Timed Code Minutes: 44 min   Total Treatment Time: 44 min     Miguel Castro, OTR/L #[id number]

## 2024-10-21 ENCOUNTER — HOSPITAL ENCOUNTER (OUTPATIENT)
Dept: OCCUPATIONAL THERAPY | Age: 69
Setting detail: THERAPIES SERIES
Discharge: HOME | End: 2024-10-21
Payer: MEDICARE

## 2024-10-21 PROCEDURE — 97022 WHIRLPOOL THERAPY: CPT

## 2024-10-21 PROCEDURE — 97110 THERAPEUTIC EXERCISES: CPT

## 2024-10-21 NOTE — DISCHARGE SUMMARY
[facility]  OCCUPATIONAL THERAPY  [] EVALUATION  [] DAILY NOTE (LAND) [] DAILY NOTE (AQUATIC ) [] PROGRESS NOTE [x] DISCHARGE NOTE    [x] OUTPATIENT REHABILITATION CENTER [facility]   [] [place] [facility] [place]    [] [facility]   [] [facility]    Date: 10/21/2024  Patient Name:  Alesha Ndiaye  : 1955  MRN: [id number]  CSN: [id number]    Referring Practitioner Ayad Marroquin MD      Diagnosis Displaced fracture of right ulna styloid process, subsequent encounter for closed fracture with routine healing S52.611D  Other fractures of lower end of right radius, subsequent encounter for closed fracture with routine healing S52.591D   Treatment Diagnosis M79.601  Right Arm Pain  M25.531  Right Wrist Pain  M25.631 Stiffness of Right Wrist  R53.1 Weakness   Date of Evaluation 24    Additional Pertinent History Alesha Ndiaye has a past medical history of Acid reflux, Arthritis, Cancer (HCC), Cancer (HCC), Cataracts, bilateral, Constipation, Depression, Epilepsy (HCC), Fibromyalgia, Hearing loss, Hiatal hernia, History of Luis-Barr virus infection, Hoarseness, Hypertension, Hypothyroidism, Mono exposure, Night sweats, Osteoarthritis, Sinus infection, SOB (shortness of breath), and UTI (urinary tract infection).      Functional Outcome Measure Used UEFS   Functional Outcome Score  (24) 32/80 (24) 54/80  (10/3/24) 60/80, 6-/80 (10/21/24)      Insurance: Primary: Payor: MEDICARE /  /  / ,   Secondary: AET[employer]   Authorization Information: Patient has unlimited visits based on medical necessity  Benefit will not cover maintenance or preventative treatment.  AQUATIC THERAPY COVERED:   Yes  MODALITIES COVERED:  Yes, with the exception of iontophoresis and hot packs/cold packs  TELEHEALTH COVERED: Yes   Approved Procedure Codes: Authorization of Specific CPT Codes Not Required  (Codes requested indicated by red font, codes approved indicated by black font)   Visit # 22,

## 2024-11-04 DIAGNOSIS — E03.9 ACQUIRED HYPOTHYROIDISM: ICD-10-CM

## 2024-11-19 RX ORDER — LEVOTHYROXINE, LIOTHYRONINE 19; 4.5 UG/1; UG/1
60 TABLET ORAL
OUTPATIENT
Start: 2024-11-19

## 2024-12-03 ENCOUNTER — OFFICE VISIT (OUTPATIENT)
Age: 69
End: 2024-12-03
Payer: MEDICARE

## 2024-12-03 VITALS
SYSTOLIC BLOOD PRESSURE: 126 MMHG | HEART RATE: 73 BPM | DIASTOLIC BLOOD PRESSURE: 74 MMHG | WEIGHT: 259.2 LBS | BODY MASS INDEX: 39.28 KG/M2 | HEIGHT: 68 IN

## 2024-12-03 DIAGNOSIS — E53.8 B12 DEFICIENCY: ICD-10-CM

## 2024-12-03 DIAGNOSIS — E03.9 ACQUIRED HYPOTHYROIDISM: Primary | ICD-10-CM

## 2024-12-03 PROCEDURE — 1036F TOBACCO NON-USER: CPT | Performed by: INTERNAL MEDICINE

## 2024-12-03 PROCEDURE — G8484 FLU IMMUNIZE NO ADMIN: HCPCS | Performed by: INTERNAL MEDICINE

## 2024-12-03 PROCEDURE — 99214 OFFICE O/P EST MOD 30 MIN: CPT | Performed by: INTERNAL MEDICINE

## 2024-12-03 PROCEDURE — 1123F ACP DISCUSS/DSCN MKR DOCD: CPT | Performed by: INTERNAL MEDICINE

## 2024-12-03 PROCEDURE — 3017F COLORECTAL CA SCREEN DOC REV: CPT | Performed by: INTERNAL MEDICINE

## 2024-12-03 PROCEDURE — 1090F PRES/ABSN URINE INCON ASSESS: CPT | Performed by: INTERNAL MEDICINE

## 2024-12-03 PROCEDURE — G8427 DOCREV CUR MEDS BY ELIG CLIN: HCPCS | Performed by: INTERNAL MEDICINE

## 2024-12-03 PROCEDURE — 1160F RVW MEDS BY RX/DR IN RCRD: CPT | Performed by: INTERNAL MEDICINE

## 2024-12-03 PROCEDURE — 1159F MED LIST DOCD IN RCRD: CPT | Performed by: INTERNAL MEDICINE

## 2024-12-03 PROCEDURE — G8417 CALC BMI ABV UP PARAM F/U: HCPCS | Performed by: INTERNAL MEDICINE

## 2024-12-03 PROCEDURE — G8400 PT W/DXA NO RESULTS DOC: HCPCS | Performed by: INTERNAL MEDICINE

## 2024-12-03 NOTE — PROGRESS NOTES
[facility] [place] [facility]  [facility] ENDOCRINOLOGY  [address]  [facility] [address]  Dept: [phone]  Loc: [phone]     Visit Date: 12/3/2024    Alesha Ndiaye is a 69 y.o. female who presents today for:  Chief Complaint   Patient presents with    Hypothyroidism            Subjective:      HPI   Alesha Nidaye is a 69 y.o. , female who comes for f/u for hypothyroidism . The patient also has B12 deficiency.  she was diagnosed with hypothyroidism 21 years ago.   Etiology of hypothyroidism is hashimoto's disease.. Current therapy includes NP thyroid 60 mg daily.  Today she reports multiple symptoms including cold intolerance, constipation, dry skin, brittle hair, depression, fatigue, memory problems and weight gain.  The patient had normal thyroid levels at the last visit.  She has not changed her medication dosage.  This patient also has B12 deficiency.  At the last visit her levels were found to be elevated.  Following that blood work she stopped taking the B12 and the levels have not been rechecked.  In addition, she had requested me to check her for diabetes and her A1c came back normal.  Past Medical History:   Diagnosis Date    Acid reflux     Arthritis     osteo -   knees    Cancer (HCC) 1985???    cervical   cryo surgery    Cancer (HCC)     skin-basal cell    Cataracts, bilateral     Constipation     Depression     Epilepsy (HCC)     last age 21 yrs    Fibromyalgia     Hearing loss     bialteral ears right is almost deaf and has hearing aid left    Hiatal hernia     History of Luis-Barr virus infection     Hoarseness     Hypertension     Hypothyroidism     Mono exposure     Night sweats     Osteoarthritis     Sinus infection     SOB (shortness of breath)     UTI (urinary tract infection) 05/30/2018      Past Surgical History:   Procedure Laterality Date    CARDIAC CATHETERIZATION  2021    no stents; had one with dr Ware 2023    CARDIOVASCULAR STRESS TEST  03/14/2011

## 2024-12-23 ENCOUNTER — HOSPITAL ENCOUNTER (EMERGENCY)
Age: 69
Discharge: HOME | End: 2024-12-23
Attending: EMERGENCY MEDICINE
Payer: MEDICARE

## 2024-12-23 ENCOUNTER — APPOINTMENT (OUTPATIENT)
Dept: GENERAL RADIOLOGY | Age: 69
End: 2024-12-23
Payer: MEDICARE

## 2024-12-23 ENCOUNTER — APPOINTMENT (OUTPATIENT)
Dept: CT IMAGING | Age: 69
End: 2024-12-23
Payer: MEDICARE

## 2024-12-23 VITALS
DIASTOLIC BLOOD PRESSURE: 77 MMHG | HEIGHT: 68 IN | BODY MASS INDEX: 39.4 KG/M2 | RESPIRATION RATE: 18 BRPM | TEMPERATURE: 98.2 F | SYSTOLIC BLOOD PRESSURE: 154 MMHG | WEIGHT: 260 LBS | HEART RATE: 66 BPM | OXYGEN SATURATION: 97 %

## 2024-12-23 DIAGNOSIS — I10 HYPERTENSION, UNSPECIFIED TYPE: Primary | ICD-10-CM

## 2024-12-23 DIAGNOSIS — R00.2 PALPITATIONS: ICD-10-CM

## 2024-12-23 LAB
AMPHETAMINES UR QL SCN: NEGATIVE
ANION GAP SERPL CALC-SCNC: 12 MEQ/L (ref 8–16)
BARBITURATES UR QL SCN: NEGATIVE
BASOPHILS ABSOLUTE: 0.1 THOU/MM3 (ref 0–0.1)
BASOPHILS NFR BLD AUTO: 0.7 %
BENZODIAZ UR QL SCN: NEGATIVE
BILIRUB UR QL STRIP: NEGATIVE
BUN SERPL-MCNC: 9 MG/DL (ref 7–22)
BZE UR QL SCN: NEGATIVE
CALCIUM SERPL-MCNC: 9.6 MG/DL (ref 8.5–10.5)
CANNABINOIDS UR QL SCN: NEGATIVE
CHARACTER UR: CLEAR
CHLORIDE SERPL-SCNC: 102 MEQ/L (ref 98–111)
CO2 SERPL-SCNC: 24 MEQ/L (ref 23–33)
COLOR UR: YELLOW
CREAT SERPL-MCNC: 0.8 MG/DL (ref 0.4–1.2)
D DIMER PPP IA.FEU-MCNC: 904 NG/ML FEU (ref 0–500)
DEPRECATED RDW RBC AUTO: 46.6 FL (ref 35–45)
EKG ATRIAL RATE: 83 BPM
EKG Q-T INTERVAL: 356 MS
EKG QRS DURATION: 86 MS
EKG QTC CALCULATION (BAZETT): 418 MS
EKG R AXIS: -17 DEGREES
EKG T AXIS: 20 DEGREES
EKG VENTRICULAR RATE: 83 BPM
EOSINOPHIL NFR BLD AUTO: 2.5 %
EOSINOPHILS ABSOLUTE: 0.2 THOU/MM3 (ref 0–0.4)
ERYTHROCYTE [DISTWIDTH] IN BLOOD BY AUTOMATED COUNT: 13.5 % (ref 11.5–14.5)
FENTANYL: NEGATIVE
FLUAV RNA RESP QL NAA+PROBE: NOT DETECTED
FLUBV RNA RESP QL NAA+PROBE: NOT DETECTED
GFR SERPL CREATININE-BSD FRML MDRD: 80 ML/MIN/1.73M2
GLUCOSE SERPL-MCNC: 112 MG/DL (ref 70–108)
GLUCOSE UR QL STRIP.AUTO: NEGATIVE MG/DL
HCT VFR BLD AUTO: 44.6 % (ref 37–47)
HGB BLD-MCNC: 14.1 GM/DL (ref 12–16)
HGB UR QL STRIP.AUTO: NEGATIVE
IMM GRANULOCYTES # BLD AUTO: 0.05 THOU/MM3 (ref 0–0.07)
IMM GRANULOCYTES NFR BLD AUTO: 0.7 %
KETONES UR QL STRIP.AUTO: NEGATIVE
LEUKOCYTE ESTERASE UR QL STRIP.AUTO: NEGATIVE
LYMPHOCYTES ABSOLUTE: 2.1 THOU/MM3 (ref 1–4.8)
LYMPHOCYTES NFR BLD AUTO: 29.6 %
MCH RBC QN AUTO: 29.8 PG (ref 26–33)
MCHC RBC AUTO-ENTMCNC: 31.6 GM/DL (ref 32.2–35.5)
MCV RBC AUTO: 94.3 FL (ref 81–99)
MONOCYTES ABSOLUTE: 0.6 THOU/MM3 (ref 0.4–1.3)
MONOCYTES NFR BLD AUTO: 8.8 %
NEUTROPHILS ABSOLUTE: 4.2 THOU/MM3 (ref 1.8–7.7)
NEUTROPHILS NFR BLD AUTO: 57.7 %
NITRITE UR QL STRIP.AUTO: NEGATIVE
NRBC BLD AUTO-RTO: 0 /100 WBC
OPIATES UR QL SCN: NEGATIVE
OSMOLALITY SERPL CALC.SUM OF ELEC: 275.1 MOSMOL/KG (ref 275–300)
OXYCODONE: NEGATIVE
PCP UR QL SCN: NEGATIVE
PH UR STRIP.AUTO: 7.5 [PH] (ref 5–9)
PLATELET # BLD AUTO: 300 THOU/MM3 (ref 130–400)
PMV BLD AUTO: 10 FL (ref 9.4–12.4)
POTASSIUM SERPL-SCNC: 4.1 MEQ/L (ref 3.5–5.2)
PROT UR STRIP.AUTO-MCNC: NEGATIVE MG/DL
RBC # BLD AUTO: 4.73 MILL/MM3 (ref 4.2–5.4)
SARS-COV-2 RNA RESP QL NAA+PROBE: NOT DETECTED
SODIUM SERPL-SCNC: 138 MEQ/L (ref 135–145)
SP GR UR REFRACT.AUTO: 1.01 (ref 1–1.03)
T4 FREE SERPL-MCNC: 1.15 NG/DL (ref 0.93–1.68)
TROPONIN, HIGH SENSITIVITY: 7 NG/L (ref 0–12)
TSH SERPL DL<=0.005 MIU/L-ACNC: 2.42 UIU/ML (ref 0.4–4.2)
UROBILINOGEN UR QL STRIP.AUTO: 0.2 EU/DL (ref 0–1)
WBC # BLD AUTO: 7.2 THOU/MM3 (ref 4.8–10.8)

## 2024-12-23 PROCEDURE — 80307 DRUG TEST PRSMV CHEM ANLYZR: CPT

## 2024-12-23 PROCEDURE — 80048 BASIC METABOLIC PNL TOTAL CA: CPT

## 2024-12-23 PROCEDURE — 81003 URINALYSIS AUTO W/O SCOPE: CPT

## 2024-12-23 PROCEDURE — 84439 ASSAY OF FREE THYROXINE: CPT

## 2024-12-23 PROCEDURE — 85025 COMPLETE CBC W/AUTO DIFF WBC: CPT

## 2024-12-23 PROCEDURE — 87636 SARSCOV2 & INF A&B AMP PRB: CPT

## 2024-12-23 PROCEDURE — 6360000004 HC RX CONTRAST MEDICATION

## 2024-12-23 PROCEDURE — 84443 ASSAY THYROID STIM HORMONE: CPT

## 2024-12-23 PROCEDURE — 71275 CT ANGIOGRAPHY CHEST: CPT

## 2024-12-23 PROCEDURE — 93010 ELECTROCARDIOGRAM REPORT: CPT | Performed by: INTERNAL MEDICINE

## 2024-12-23 PROCEDURE — 85379 FIBRIN DEGRADATION QUANT: CPT

## 2024-12-23 PROCEDURE — 84484 ASSAY OF TROPONIN QUANT: CPT

## 2024-12-23 PROCEDURE — 6370000000 HC RX 637 (ALT 250 FOR IP)

## 2024-12-23 PROCEDURE — 71111 X-RAY EXAM RIBS/CHEST4/> VWS: CPT

## 2024-12-23 PROCEDURE — 93005 ELECTROCARDIOGRAM TRACING: CPT | Performed by: EMERGENCY MEDICINE

## 2024-12-23 PROCEDURE — 99285 EMERGENCY DEPT VISIT HI MDM: CPT

## 2024-12-23 PROCEDURE — 36415 COLL VENOUS BLD VENIPUNCTURE: CPT

## 2024-12-23 RX ORDER — IOPAMIDOL 755 MG/ML
80 INJECTION, SOLUTION INTRAVASCULAR
Status: COMPLETED | OUTPATIENT
Start: 2024-12-23 | End: 2024-12-23

## 2024-12-23 RX ORDER — CYCLOBENZAPRINE HCL 10 MG
10 TABLET ORAL ONCE
Status: COMPLETED | OUTPATIENT
Start: 2024-12-23 | End: 2024-12-23

## 2024-12-23 RX ADMIN — IOPAMIDOL 80 ML: 755 INJECTION, SOLUTION INTRAVENOUS at 11:58

## 2024-12-23 RX ADMIN — CYCLOBENZAPRINE 10 MG: 10 TABLET, FILM COATED ORAL at 11:36

## 2024-12-23 ASSESSMENT — PAIN SCALES - GENERAL
PAINLEVEL_OUTOF10: 6
PAINLEVEL_OUTOF10: 9
PAINLEVEL_OUTOF10: 9

## 2024-12-23 ASSESSMENT — PAIN DESCRIPTION - PAIN TYPE: TYPE: ACUTE PAIN

## 2024-12-23 ASSESSMENT — PAIN - FUNCTIONAL ASSESSMENT
PAIN_FUNCTIONAL_ASSESSMENT: 0-10

## 2024-12-23 ASSESSMENT — PAIN DESCRIPTION - ORIENTATION: ORIENTATION: LEFT

## 2024-12-23 ASSESSMENT — PAIN DESCRIPTION - LOCATION: LOCATION: ARM;SHOULDER

## 2024-12-23 NOTE — ED TRIAGE NOTES
Pt presents to the ED through triage with c/c palpitations and high blood pressure. Pt reports she was diagnosed with afib on 12/17/24. Pt was instructed to start blood thinners on 12/19/but then she sustained a fall. Pt seen after fall and was diagnosed with fracture in left hand. Pt has bruising noted around left eye. Head CT negative on 12/19 at [facility]. Pt has not started blood thinners since being diagnosed with afib. Pt has event monitor in place. EKG completed on arrival to ED

## 2024-12-23 NOTE — DISCHARGE INSTRUCTIONS
Evaluated today in the ED for palpitations hypertension.  Your laboratory studies and imaging studies showed no acute pathology contributing to her symptoms here in the ED.  He will be going home with no new medications, and recommended continue follow-up with your cardiologist at [facility] for palpitations and review of your Holter monitor results.  Please come back to the ED with any significant chest pain or discomfort that is persistent, shortness of breath, or symptoms severe to limit your ability to perform activities of daily living

## 2024-12-23 NOTE — ED PROVIDER NOTES
[facility] EMERGENCY DEPT  EMERGENCY DEPARTMENT ENCOUNTER          Pt Name: Alesha Ndiaye  MRN: [id number]  Birthdate 1955  Date of evaluation: 12/23/2024  Physician: Jarrett Richard MD  Supervising Attending Physician: Michael Lucero DO       CHIEF COMPLAINT       Chief Complaint   Patient presents with    Palpitations    Hypertension         HISTORY OF PRESENT ILLNESS    HPI  Alesha Ndiaye is a 69 y.o. female who presents to the emergency department from home, by private vehicle for evaluation of palpitations and hypertension.  The patient reports that last Thursday, she had a mechanical fall and was admitted at [facility] with a head CT and x-rays of her left wrist, both of which were benign for any acute pathology.  Since then, she has had intermittent palpitations although she is not currently experiencing them here in the ED.  She has also had chest tightness that radiates to her back.  The patient does not have a history of blood clots, denies fever, but does have chills and dry cough.  In addition, she reports urinary frequency, but denies dysuria and hematuria.  The patient has been evaluated by a cardiologist in [facility] for atrial fibrillation, is on Eliquis, is admitted taking metoprolol, and does have a Holter monitor in place for evaluation of this.  The patient denies any recent calf pain/swelling/tenderness.  Regarding her hypertension, the patient reports that her normal systolic blood pressures 120s to 130s with the diastolic blood pressure of 80s to 90s..  The patient has been compliant with all her medications that include metoprolol, Synthroid, Nexium, aspirin, and Tylenol.  The patient reports that she had a recent increase in her Synthroid approximately 3 months ago, but she has not had a recent TSH and T4, which she reports is normal. The patient has no other acute complaints at this time.      REVIEW OF SYSTEMS   Review of Systems      PAST MEDICAL AND SURGICAL

## 2025-01-10 ENCOUNTER — HOSPITAL ENCOUNTER (EMERGENCY)
Age: 70
Discharge: HOME | End: 2025-01-10
Payer: MEDICARE

## 2025-01-10 VITALS
HEIGHT: 68 IN | DIASTOLIC BLOOD PRESSURE: 85 MMHG | OXYGEN SATURATION: 96 % | SYSTOLIC BLOOD PRESSURE: 135 MMHG | WEIGHT: 260 LBS | TEMPERATURE: 98.8 F | HEART RATE: 94 BPM | BODY MASS INDEX: 39.4 KG/M2 | RESPIRATION RATE: 20 BRPM

## 2025-01-10 DIAGNOSIS — R05.1 ACUTE COUGH: Primary | ICD-10-CM

## 2025-01-10 DIAGNOSIS — R09.81 SINUS CONGESTION: ICD-10-CM

## 2025-01-10 PROCEDURE — 99213 OFFICE O/P EST LOW 20 MIN: CPT

## 2025-01-10 RX ORDER — IBUPROFEN 200 MG
200 TABLET ORAL EVERY 6 HOURS PRN
COMMUNITY

## 2025-01-10 RX ORDER — PREDNISONE 10 MG/1
TABLET ORAL
Qty: 20 TABLET | Refills: 0 | Status: SHIPPED | OUTPATIENT
Start: 2025-01-10 | End: 2025-01-20

## 2025-01-10 RX ORDER — AZITHROMYCIN 250 MG/1
TABLET, FILM COATED ORAL
Qty: 6 TABLET | Refills: 0 | Status: SHIPPED | OUTPATIENT
Start: 2025-01-10 | End: 2025-01-20

## 2025-01-10 RX ORDER — LORATADINE 10 MG
1 CAPSULE ORAL EVERY 6 HOURS PRN
Qty: 168 CAPSULE | Refills: 0 | Status: SHIPPED | OUTPATIENT
Start: 2025-01-10

## 2025-01-10 RX ORDER — FLUTICASONE PROPIONATE 50 MCG
2 SPRAY, SUSPENSION (ML) NASAL DAILY
Qty: 16 G | Refills: 0 | Status: SHIPPED | OUTPATIENT
Start: 2025-01-10

## 2025-01-10 ASSESSMENT — PAIN SCALES - GENERAL: PAINLEVEL_OUTOF10: 7

## 2025-01-10 ASSESSMENT — PAIN DESCRIPTION - PAIN TYPE: TYPE: ACUTE PAIN

## 2025-01-10 ASSESSMENT — PAIN - FUNCTIONAL ASSESSMENT
PAIN_FUNCTIONAL_ASSESSMENT: ACTIVITIES ARE NOT PREVENTED
PAIN_FUNCTIONAL_ASSESSMENT: 0-10

## 2025-01-10 ASSESSMENT — PAIN DESCRIPTION - ONSET: ONSET: ON-GOING

## 2025-01-10 ASSESSMENT — PAIN DESCRIPTION - LOCATION: LOCATION: HEAD

## 2025-01-10 ASSESSMENT — PAIN DESCRIPTION - DESCRIPTORS: DESCRIPTORS: ACHING

## 2025-01-10 ASSESSMENT — PAIN DESCRIPTION - FREQUENCY: FREQUENCY: CONTINUOUS

## 2025-01-10 NOTE — ED PROVIDER NOTES
[facility] URGENT CARE      URGENT CARE     Pt Name: Alesha Ndiaye  MRN: [id number]  Birthdate 1955  Date of evaluation: 1/10/2025  Provider: LIS Talbot CNP    Urgent Care Encounter     CHIEF COMPLAINT       Chief Complaint   Patient presents with    Cough    Headache     HISTORY OF PRESENT ILLNESS   Alesha Ndiaye is a 69 y.o. female who presents to urgent care chief complaint of sinus congestion, cough and headache.  Symptoms started Tuesday, overall worsening.  Admits to sick contact with similar symptoms \"we had a ladies dinner on Monday, I just talked with one of my friends that sedated been sick like this and diagnosed with bronchitis for 5 days.\"  Denies chest pains.  Denies nausea vomiting abdominal pain or diarrhea.  Denies taking anything for symptoms.  Unsure if she has had fevers.  States he is not usually in A-fib but has a history of being in A-fib where she spontaneously came out of it and has not been in its to her knowledge.  Has follow-up with cardiologist in a few days    History obtained from patient    PAST MEDICAL HISTORY         Diagnosis Date    Acid reflux     Arthritis     osteo -   knees    Atrial fibrillation, chronic (HCC)     Cancer (HCC) 1985???    cervical   cryo surgery    Cancer (HCC)     skin-basal cell    Cataracts, bilateral     Constipation     Depression     Epilepsy (HCC)     last age 21 yrs    Fibromyalgia     Hearing loss     bialteral ears right is almost deaf and has hearing aid left    Hiatal hernia     History of Luis-Barr virus infection     Hoarseness     Hypertension     Hypothyroidism     Mono exposure     Night sweats     Osteoarthritis     Sinus infection     SOB (shortness of breath)     UTI (urinary tract infection) 05/30/2018     SURGICALHISTORY     Patient  has a past surgical history that includes cardiovascular stress test (03/14/2011); doppler echocardiography (03/18/2011); Cholecystectomy (2013); Tonsillectomy; Colonoscopy; fracture

## 2025-01-10 NOTE — DISCHARGE INSTRUCTIONS
Your assessment and current illness are greatly indicative of bronchitis.   Please take azithromycin/prednisone as prescribed other gone even if you are feeling better.  Use Flonase in morning/evening for the next 5 days.  Use Coricidin on a as needed basis as prescribed.    Please perform sinus rinses outlined in your discharge paperwork.    Please use Flonase in morning/evening for the next week.  Hydrate well keeping urine clear/pale yellow, this will help decrease side effects of antibiotics.    If you have symptoms including but not limited to chest pain/shortness of breath, uncontrolled fevers, uncontrolled nausea/vomiting please go to ER.    Your symptoms fail to improve okay to see family doctor or return to urgent care.    I hope you are feeling better soon!.

## 2025-05-13 DIAGNOSIS — E03.9 ACQUIRED HYPOTHYROIDISM: ICD-10-CM

## 2025-05-14 DIAGNOSIS — E03.9 ACQUIRED HYPOTHYROIDISM: ICD-10-CM

## 2025-05-14 RX ORDER — THYROID 60 MG/1
60 TABLET ORAL DAILY
Qty: 90 TABLET | Refills: 1 | Status: SHIPPED | OUTPATIENT
Start: 2025-05-14

## 2025-05-14 RX ORDER — LEVOTHYROXINE, LIOTHYRONINE 38; 9 UG/1; UG/1
60 TABLET ORAL DAILY
Qty: 90 TABLET | Refills: 0 | OUTPATIENT
Start: 2025-05-14

## 2025-06-03 ENCOUNTER — OFFICE VISIT (OUTPATIENT)
Age: 70
End: 2025-06-03
Payer: MEDICARE

## 2025-06-03 VITALS
DIASTOLIC BLOOD PRESSURE: 82 MMHG | SYSTOLIC BLOOD PRESSURE: 110 MMHG | HEART RATE: 73 BPM | HEIGHT: 68 IN | BODY MASS INDEX: 39.89 KG/M2 | WEIGHT: 263.2 LBS

## 2025-06-03 DIAGNOSIS — Z13.1 DIABETES MELLITUS SCREENING: ICD-10-CM

## 2025-06-03 DIAGNOSIS — E03.9 ACQUIRED HYPOTHYROIDISM: Primary | ICD-10-CM

## 2025-06-03 DIAGNOSIS — E53.8 B12 DEFICIENCY: ICD-10-CM

## 2025-06-03 LAB
ESTIMATED AVERAGE GLUCOSE: 111
FOLATE: 10.6
HBA1C MFR BLD: 5.5 %
T3 FREE: 3.55
T4 FREE: 1.01
TSH SERPL DL<=0.05 MIU/L-ACNC: 2.06 UIU/ML
VITAMIN B-12: 397

## 2025-06-03 PROCEDURE — G8417 CALC BMI ABV UP PARAM F/U: HCPCS | Performed by: INTERNAL MEDICINE

## 2025-06-03 PROCEDURE — G8400 PT W/DXA NO RESULTS DOC: HCPCS | Performed by: INTERNAL MEDICINE

## 2025-06-03 PROCEDURE — 99214 OFFICE O/P EST MOD 30 MIN: CPT | Performed by: INTERNAL MEDICINE

## 2025-06-03 PROCEDURE — 1159F MED LIST DOCD IN RCRD: CPT | Performed by: INTERNAL MEDICINE

## 2025-06-03 PROCEDURE — 1036F TOBACCO NON-USER: CPT | Performed by: INTERNAL MEDICINE

## 2025-06-03 PROCEDURE — G8427 DOCREV CUR MEDS BY ELIG CLIN: HCPCS | Performed by: INTERNAL MEDICINE

## 2025-06-03 PROCEDURE — 1090F PRES/ABSN URINE INCON ASSESS: CPT | Performed by: INTERNAL MEDICINE

## 2025-06-03 PROCEDURE — 3017F COLORECTAL CA SCREEN DOC REV: CPT | Performed by: INTERNAL MEDICINE

## 2025-06-03 PROCEDURE — 1160F RVW MEDS BY RX/DR IN RCRD: CPT | Performed by: INTERNAL MEDICINE

## 2025-06-03 PROCEDURE — 1123F ACP DISCUSS/DSCN MKR DOCD: CPT | Performed by: INTERNAL MEDICINE

## 2025-06-03 RX ORDER — THYROID 60 MG/1
60 TABLET ORAL DAILY
Qty: 90 TABLET | Refills: 1 | Status: SHIPPED | OUTPATIENT
Start: 2025-06-03

## 2025-06-03 RX ORDER — PROGESTERONE 200 MG/1
CAPSULE ORAL
COMMUNITY
Start: 2025-04-04

## 2025-06-03 RX ORDER — LOSARTAN POTASSIUM 50 MG/1
50 TABLET ORAL DAILY
COMMUNITY
Start: 2025-05-31

## 2025-06-03 NOTE — PROGRESS NOTES
[facility] [place] [facility]  [facility] ENDOCRINOLOGY  [address]  [address]  [place] [place] [address]  Dept: [phone]  Loc: [phone]     Visit Date: 6/3/2025    Alesha Ndiaye is a 69 y.o. female who presents today for:  Chief Complaint   Patient presents with    Follow-up     Acquired  hypothyroidism             Subjective:      HPI   Alesha Ndiaye is a 69 y.o. , female who comes for f/u for hypothyroidism . The patient also has B12 deficiency.  she was diagnosed with hypothyroidism 22 years ago.   Etiology of hypothyroidism is hashimoto's disease.. Current therapy includes NP thyroid 60 mg daily.  The patient has not had any labs since the last visit.  Current symptoms include Cold intolerance, Depression, and Weight Gain   This patient also has B12 deficiency.  She has not taken it consistently.  Again she would like me to test her for diabetes because her parents were both diabetic.  She does not really have polyuria polydipsia or visual changes.  Past Medical History:   Diagnosis Date    Acid reflux     Arthritis     osteo -   knees    Atrial fibrillation, chronic (HCC)     Cancer (HCC) 1985???    cervical   cryo surgery    Cancer (HCC)     skin-basal cell    Cataracts, bilateral     Constipation     Depression     Epilepsy (HCC)     last age 21 yrs    Fibromyalgia     Hearing loss     bialteral ears right is almost deaf and has hearing aid left    Hiatal hernia     History of Luis-Barr virus infection     Hoarseness     Hypertension     Hypothyroidism     Mono exposure     Night sweats     Osteoarthritis     Sinus infection     SOB (shortness of breath)     UTI (urinary tract infection) 05/30/2018      Past Surgical History:   Procedure Laterality Date    CARDIAC CATHETERIZATION  2021    no stents; had one with dr Ware 2023    CARDIOVASCULAR STRESS TEST  03/14/2011    No evidence of ischemia is noted.    CARPAL TUNNEL RELEASE Right     CHOLECYSTECTOMY  2013    COLONOSCOPY      last

## 2025-06-07 ENCOUNTER — RESULTS FOLLOW-UP (OUTPATIENT)
Age: 70
End: 2025-06-07

## 2025-07-14 ENCOUNTER — TRANSCRIBE ORDERS (OUTPATIENT)
Dept: ADMINISTRATIVE | Age: 70
End: 2025-07-14

## 2025-07-14 DIAGNOSIS — K83.8: Primary | ICD-10-CM

## 2025-07-21 ENCOUNTER — HOSPITAL ENCOUNTER (OUTPATIENT)
Dept: ULTRASOUND IMAGING | Age: 70
Discharge: HOME | End: 2025-07-21
Attending: INTERNAL MEDICINE
Payer: MEDICARE

## 2025-07-21 DIAGNOSIS — K83.8: ICD-10-CM

## 2025-07-21 PROCEDURE — 76705 ECHO EXAM OF ABDOMEN: CPT
